# Patient Record
Sex: MALE | Race: WHITE | Employment: FULL TIME | URBAN - METROPOLITAN AREA
[De-identification: names, ages, dates, MRNs, and addresses within clinical notes are randomized per-mention and may not be internally consistent; named-entity substitution may affect disease eponyms.]

---

## 2017-02-09 DIAGNOSIS — F41.9 ANXIETY: Primary | ICD-10-CM

## 2017-02-09 NOTE — TELEPHONE ENCOUNTER
Bupropion er 150mg tabs       Last Written Prescription Date: 01/21/2016  Last Fill Quantity: 180; # refills: 1  Last Office Visit with [facility], [facility] or [facility] prescribing provider:  01/21/2016        Last PHQ-9 score on record=   PHQ-9 SCORE 3/2/2012   Total Score 1       AST       31   1/21/2016  ALT       46   1/21/2016

## 2017-02-12 RX ORDER — BUPROPION HYDROCHLORIDE 150 MG/1
150 TABLET, EXTENDED RELEASE ORAL 2 TIMES DAILY
Qty: 180 TABLET | Refills: 0 | Status: SHIPPED | OUTPATIENT
Start: 2017-02-12 | End: 2017-05-16

## 2017-03-02 ENCOUNTER — OFFICE VISIT (OUTPATIENT)
Dept: UROLOGY | Facility: CLINIC | Age: 67
End: 2017-03-02
Payer: COMMERCIAL

## 2017-03-02 VITALS
HEIGHT: 74 IN | HEART RATE: 68 BPM | BODY MASS INDEX: 35.94 KG/M2 | WEIGHT: 280 LBS | DIASTOLIC BLOOD PRESSURE: 66 MMHG | SYSTOLIC BLOOD PRESSURE: 132 MMHG

## 2017-03-02 DIAGNOSIS — Z85.46 PERSONAL HISTORY OF MALIGNANT NEOPLASM OF PROSTATE: Primary | ICD-10-CM

## 2017-03-02 DIAGNOSIS — Z85.46 PERSONAL HISTORY OF MALIGNANT NEOPLASM OF PROSTATE: ICD-10-CM

## 2017-03-02 LAB — PSA SERPL-MCNC: 0.82 NG/ML (ref 0–4)

## 2017-03-02 PROCEDURE — 99213 OFFICE O/P EST LOW 20 MIN: CPT | Performed by: UROLOGY

## 2017-03-02 PROCEDURE — 36415 COLL VENOUS BLD VENIPUNCTURE: CPT | Performed by: UROLOGY

## 2017-03-02 PROCEDURE — G0103 PSA SCREENING: HCPCS | Performed by: UROLOGY

## 2017-03-02 ASSESSMENT — PAIN SCALES - GENERAL: PAINLEVEL: NO PAIN (0)

## 2017-03-02 NOTE — NURSING NOTE
Chief Complaint   Patient presents with     Clinic Care Coordination - Follow-up     Yearly exam and PSA     Mahogany Greene LPN
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
## 2017-03-02 NOTE — MR AVS SNAPSHOT
"              After Visit Summary   3/2/2017    Keenan Sprague    MRN: [id number]           Patient Information     Date Of Birth          1950        Visit Information        Provider Department      3/2/2017 2:20 PM Adam Perez MD [facility] [place]        Today's Diagnoses     Personal history of malignant neoplasm of prostate           Follow-ups after your visit        Follow-up notes from your care team     Return in about 1 year (around 3/2/2018) for PSA, Physical Exam, cystoscopy.      Who to contact     If you have questions or need follow up information about today's clinic visit or your schedule please contact [facility] [place] directly at [phone].  Normal or non-critical lab and imaging results will be communicated to you by Zephyrhart, letter or phone within 4 business days after the clinic has received the results. If you do not hear from us within 7 days, please contact the clinic through Zephyrhart or phone. If you have a critical or abnormal lab result, we will notify you by phone as soon as possible.  Submit refill requests through You.i or call your pharmacy and they will forward the refill request to us. Please allow 3 business days for your refill to be completed.          Additional Information About Your Visit        MyChart Information     You.i gives you secure access to your electronic health record. If you see a primary care provider, you can also send messages to your care team and make appointments. If you have questions, please call your primary care clinic.  If you do not have a primary care provider, please call [phone] and they will assist you.        Care EveryWhere ID     This is your Care EveryWhere ID. This could be used by other organizations to access your [place] medical records  [id number]        Your Vitals Were     Pulse Height BMI (Body Mass Index)             68 1.88 m (6' 2\") " 35.95 kg/m2          Blood Pressure from Last 3 Encounters:   03/02/17 132/66   06/21/16 122/80   01/21/16 116/64    Weight from Last 3 Encounters:   03/02/17 127 kg (280 lb)   10/01/16 127.4 kg (280 lb 14.4 oz)   06/21/16 124.7 kg (275 lb)              We Performed the Following     PSA Screen Urologic Phys [[id number]]        Primary Care Provider Office Phone # Fax #    Floyd Zaman [phone]       [facility] [address] [facility] [address]  [facility] [address]        Thank you!     Thank you for choosing [facility] [place]  for your care. Our goal is always to provide you with excellent care. Hearing back from our patients is one way we can continue to improve our services. Please take a few minutes to complete the written survey that you may receive in the mail after your visit with us. Thank you!             Your Updated Medication List - Protect others around you: Learn how to safely use, store and throw away your medicines at www.disposemymeds.org.          This list is accurate as of: 3/2/17 11:59 PM.  Always use your most recent med list.                   Brand Name Dispense Instructions for use    aspirin  MG EC tablet     30 tablet    Take 1 tablet by mouth every 6 hours as needed for pain.       buPROPion 150 MG 12 hr tablet    WELLBUTRIN SR    180 tablet    Take 1 tablet (150 mg) by mouth 2 times daily       CENTRUM SILVER PO      Take 1 tablet by mouth daily.       fa-pyridoxine-cyancobalamin 2.5-25-2 MG Tabs per tablet    FOLBIC    180 each    Take 2 tablets by mouth daily       HYDROcodone-acetaminophen 5-325 MG per tablet    NORCO    15 tablet    Take 1 tablet by mouth every 6 hours as needed       * order for DME     1 Device    CPAP mask and supplies       * order for DME     1 Units    New CPAP machine, mask/interface.       * order for DME     1 Device    Auto-CPAP: Max 10 cm H2O Min 8 cm H2O  Continuous  Lifetime need and heated  humidity.       vitamin D 2000 UNITS tablet     100 tablet    Take 2,000 Units by mouth daily       * Notice:  This list has 3 medication(s) that are the same as other medications prescribed for you. Read the directions carefully, and ask your doctor or other care provider to review them with you.
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
## 2017-05-16 ENCOUNTER — TELEPHONE (OUTPATIENT)
Dept: INTERNAL MEDICINE | Facility: CLINIC | Age: 67
End: 2017-05-16

## 2017-05-16 DIAGNOSIS — F41.9 ANXIETY: ICD-10-CM

## 2017-05-16 RX ORDER — BUPROPION HYDROCHLORIDE 150 MG/1
150 TABLET, EXTENDED RELEASE ORAL 2 TIMES DAILY
Qty: 180 TABLET | Refills: 0 | Status: SHIPPED | OUTPATIENT
Start: 2017-05-16 | End: 2017-05-24

## 2017-05-16 NOTE — TELEPHONE ENCOUNTER
Reason for Call:  Medication or medication refill:    Do you use a [facility]?  Name of the pharmacy and phone number for the current request:  [facility]    Name of the medication requested: buPROPion (WELLBUTRIN SR) 150 MG 12 hr tablet    Other request: pt would like a refill for 3 months on the medication listed above    Can we leave a detailed message on this number? YES    Phone number patient can be reached at: Cell number on file:    Telephone Information:   Mobile [phone]       Best Time: anytime    Call taken on 5/16/2017 at 1:31 PM by Tashia Graves

## 2017-05-16 NOTE — TELEPHONE ENCOUNTER
Wellbutrin       Last Written Prescription Date: 2-12-17  Last Fill Quantity: 180; # refills: 0  Last Office Visit with [facility] or [facility] prescribing provider:  1-21-16        Last PHQ-9 score on record=   PHQ-9 SCORE 3/2/2012   Total Score 1       Lab Results   Component Value Date    AST 31 01/21/2016     Lab Results   Component Value Date    ALT 46 01/21/2016       Labs showing if normal/abnormal  Lab Results   Component Value Date    AST 31 01/21/2016    ALT 46 01/21/2016       Pt informed over-due for OV and transferred to schedule appt.    Med refilled x 1.

## 2017-05-24 ENCOUNTER — OFFICE VISIT (OUTPATIENT)
Dept: INTERNAL MEDICINE | Facility: CLINIC | Age: 67
End: 2017-05-24
Payer: COMMERCIAL

## 2017-05-24 VITALS
WEIGHT: 284.4 LBS | HEIGHT: 74 IN | OXYGEN SATURATION: 96 % | RESPIRATION RATE: 16 BRPM | SYSTOLIC BLOOD PRESSURE: 126 MMHG | HEART RATE: 72 BPM | DIASTOLIC BLOOD PRESSURE: 78 MMHG | BODY MASS INDEX: 36.5 KG/M2 | TEMPERATURE: 98.4 F

## 2017-05-24 DIAGNOSIS — F41.9 ANXIETY: ICD-10-CM

## 2017-05-24 PROCEDURE — 99212 OFFICE O/P EST SF 10 MIN: CPT | Performed by: NURSE PRACTITIONER

## 2017-05-24 RX ORDER — BUPROPION HYDROCHLORIDE 150 MG/1
150 TABLET, EXTENDED RELEASE ORAL 2 TIMES DAILY
Qty: 180 TABLET | Refills: 3 | Status: SHIPPED | OUTPATIENT
Start: 2017-05-24 | End: 2018-07-10

## 2017-05-24 ASSESSMENT — ANXIETY QUESTIONNAIRES
1. FEELING NERVOUS, ANXIOUS, OR ON EDGE: NOT AT ALL
IF YOU CHECKED OFF ANY PROBLEMS ON THIS QUESTIONNAIRE, HOW DIFFICULT HAVE THESE PROBLEMS MADE IT FOR YOU TO DO YOUR WORK, TAKE CARE OF THINGS AT HOME, OR GET ALONG WITH OTHER PEOPLE: NOT DIFFICULT AT ALL
3. WORRYING TOO MUCH ABOUT DIFFERENT THINGS: NOT AT ALL
6. BECOMING EASILY ANNOYED OR IRRITABLE: NOT AT ALL
2. NOT BEING ABLE TO STOP OR CONTROL WORRYING: NOT AT ALL
7. FEELING AFRAID AS IF SOMETHING AWFUL MIGHT HAPPEN: NOT AT ALL
GAD7 TOTAL SCORE: 0
5. BEING SO RESTLESS THAT IT IS HARD TO SIT STILL: NOT AT ALL

## 2017-05-24 ASSESSMENT — PATIENT HEALTH QUESTIONNAIRE - PHQ9: 5. POOR APPETITE OR OVEREATING: NOT AT ALL

## 2017-05-24 NOTE — MR AVS SNAPSHOT
"              After Visit Summary   5/24/2017    Keenan Sprague    MRN: [id number]           Patient Information     Date Of Birth          1950        Visit Information        Provider Department      5/24/2017 6:40 AM Esperanza Yang NP [facility]        Today's Diagnoses     Anxiety           Follow-ups after your visit        Who to contact     If you have questions or need follow up information about today's clinic visit or your schedule please contact [facility] directly at [phone].  Normal or non-critical lab and imaging results will be communicated to you by MyChart, letter or phone within 4 business days after the clinic has received the results. If you do not hear from us within 7 days, please contact the clinic through Virtwayhart or phone. If you have a critical or abnormal lab result, we will notify you by phone as soon as possible.  Submit refill requests through ePark Systems or call your pharmacy and they will forward the refill request to us. Please allow 3 business days for your refill to be completed.          Additional Information About Your Visit        MyChart Information     ePark Systems gives you secure access to your electronic health record. If you see a primary care provider, you can also send messages to your care team and make appointments. If you have questions, please call your primary care clinic.  If you do not have a primary care provider, please call [phone] and they will assist you.        Care EveryWhere ID     This is your Care EveryWhere ID. This could be used by other organizations to access your [place] medical records  [id number]        Your Vitals Were     Pulse Temperature Respirations Height Pulse Oximetry BMI (Body Mass Index)    72 98.4  F (36.9  C) (Oral) 16 6' 2\" (1.88 m) 96% 36.51 kg/m2       Blood Pressure from Last 3 Encounters:   05/24/17 126/78   03/02/17 132/66   06/21/16 122/80    Weight from Last 3 Encounters: "   05/24/17 284 lb 6.4 oz (129 kg)   03/02/17 280 lb (127 kg)   10/01/16 280 lb 14.4 oz (127.4 kg)              Today, you had the following     No orders found for display         Where to get your medicines      These medications were sent to [facility] [place] - [address] [address] [address]  [address] [facility] [address]     Phone:  [phone]     buPROPion 150 MG 12 hr tablet          Primary Care Provider Office Phone # Fax #    Floyd Zaman [phone]       [facility] [address] [facility] [address]  [facility] [address]        Thank you!     Thank you for choosing [facility]  for your care. Our goal is always to provide you with excellent care. Hearing back from our patients is one way we can continue to improve our services. Please take a few minutes to complete the written survey that you may receive in the mail after your visit with us. Thank you!             Your Updated Medication List - Protect others around you: Learn how to safely use, store and throw away your medicines at www.disposemymeds.org.          This list is accurate as of: 5/24/17  7:21 AM.  Always use your most recent med list.                   Brand Name Dispense Instructions for use    aspirin  MG EC tablet     30 tablet    Take 1 tablet by mouth every 6 hours as needed for pain.       buPROPion 150 MG 12 hr tablet    WELLBUTRIN SR    180 tablet    Take 1 tablet (150 mg) by mouth 2 times daily       CENTRUM SILVER PO      Take 1 tablet by mouth daily.       fa-pyridoxine-cyancobalamin 2.5-25-2 MG Tabs per tablet    FOLBIC    180 each    Take 2 tablets by mouth daily       * order for DME     1 Device    CPAP mask and supplies       * order for DME     1 Units    New CPAP machine, mask/interface.       * order for DME     1 Device    Auto-CPAP: Max 10 cm H2O Min 8 cm H2O  Continuous  Lifetime need and heated humidity.       vitamin D 2000 UNITS tablet      100 tablet    Take 2,000 Units by mouth daily       * Notice:  This list has 3 medication(s) that are the same as other medications prescribed for you. Read the directions carefully, and ask your doctor or other care provider to review them with you.

## 2017-05-24 NOTE — NURSING NOTE
"Chief Complaint   Patient presents with     Recheck Medication     wellbutrin       Initial /78 (BP Location: Right arm, Patient Position: Chair, Cuff Size: Adult Large)  Pulse 72  Temp 98.4  F (36.9  C) (Oral)  Resp 16  Ht 6' 2\" (1.88 m)  Wt 284 lb 6.4 oz (129 kg)  SpO2 96%  BMI 36.51 kg/m2 Estimated body mass index is 36.51 kg/(m^2) as calculated from the following:    Height as of this encounter: 6' 2\" (1.88 m).    Weight as of this encounter: 284 lb 6.4 oz (129 kg).  Medication Reconciliation: complete    "
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
## 2017-05-25 ASSESSMENT — ANXIETY QUESTIONNAIRES: GAD7 TOTAL SCORE: 0

## 2017-05-25 ASSESSMENT — PATIENT HEALTH QUESTIONNAIRE - PHQ9: SUM OF ALL RESPONSES TO PHQ QUESTIONS 1-9: 0

## 2017-06-29 ENCOUNTER — APPOINTMENT (OUTPATIENT)
Dept: GENERAL RADIOLOGY | Facility: CLINIC | Age: 67
End: 2017-06-29
Attending: EMERGENCY MEDICINE
Payer: COMMERCIAL

## 2017-06-29 ENCOUNTER — HOSPITAL ENCOUNTER (EMERGENCY)
Facility: CLINIC | Age: 67
Discharge: HOME | End: 2017-06-29
Attending: EMERGENCY MEDICINE | Admitting: EMERGENCY MEDICINE
Payer: COMMERCIAL

## 2017-06-29 VITALS
TEMPERATURE: 98.6 F | OXYGEN SATURATION: 97 % | HEART RATE: 89 BPM | SYSTOLIC BLOOD PRESSURE: 119 MMHG | RESPIRATION RATE: 12 BRPM | DIASTOLIC BLOOD PRESSURE: 82 MMHG

## 2017-06-29 DIAGNOSIS — M25.511 ACUTE PAIN OF RIGHT SHOULDER: ICD-10-CM

## 2017-06-29 PROCEDURE — 73030 X-RAY EXAM OF SHOULDER: CPT | Mod: RT

## 2017-06-29 PROCEDURE — 99283 EMERGENCY DEPT VISIT LOW MDM: CPT

## 2017-06-29 RX ORDER — METHOCARBAMOL 500 MG/1
1000 TABLET, FILM COATED ORAL 4 TIMES DAILY PRN
Qty: 40 TABLET | Refills: 0 | Status: SHIPPED | OUTPATIENT
Start: 2017-06-29 | End: 2017-07-04

## 2017-06-29 ASSESSMENT — ENCOUNTER SYMPTOMS
HEADACHES: 0
SORE THROAT: 0
HEMATURIA: 0
CONSTIPATION: 0
FEVER: 0
PALPITATIONS: 0
NAUSEA: 0
VOMITING: 0
RHINORRHEA: 0
SHORTNESS OF BREATH: 0
BLOOD IN STOOL: 0
SINUS PRESSURE: 0
DIZZINESS: 0
CHILLS: 0
NECK STIFFNESS: 0
DYSURIA: 0
NUMBNESS: 0
DIARRHEA: 0
ARTHRALGIAS: 1
LIGHT-HEADEDNESS: 0
BACK PAIN: 0
NECK PAIN: 0
COUGH: 0
ABDOMINAL PAIN: 0

## 2017-06-29 NOTE — ED NOTES
Pt c/o pain in right shoulder that began this afternoon at about 2 or 3. Pt then noticed he had an area of swelling on the top of his shoulder. Denies any injury that he knows of, but states he does a lot of overhead lifting. Pt alert and oriented, ABCs intact.

## 2017-06-29 NOTE — ED AVS SNAPSHOT
[facility] Emergency Department    [address]    [facility] [address]    Phone:  [phone]    Fax:  [phone]                                       Keenan Sprague   MRN: [id number]    Department:  [facility] Emergency Department   Date of Visit:  6/29/2017           Patient Information     Date Of Birth          1950        Your diagnoses for this visit were:     Acute pain of right shoulder        You were seen by Adam Galdamez MD and Melany Benavides PA-C.      Follow-up Information     Follow up with Floyd Zaman MD In 3 days.    Specialty:  Internal Medicine    Why:  As needed    Contact information:    [facility]  [address]  [facility] [address]  [phone]          Follow up with [facility] Emergency Department.    Specialty:  EMERGENCY MEDICINE    Why:  If symptoms worsen    Contact information:    [address]  [facility] [address] [phone]        Discharge Instructions       Ice to the area, massage and gentle range of motion exercises will be helpful!        Exercises for Shoulder Flexibility: Internal Rotation    This stretch can help restore shoulder flexibility and relieve pain over time. When stretching, be sure to breathe deeply. Follow any special instructions from your healthcare provider or physical therapist.  1. While seated, move the arm on the side you want to stretch toward the middle of your back. The palm of your hand should face out.  2. Cup your other hand under the hand that s behind your back. Gently push your cupped hand upward until you feel the stretch in the shoulder. Try to hold the stretch for 5 seconds.  3. Work up to doing 3 sets of this stretch, 3 times a day. Work up to holding the stretch for 30 to 60 seconds.  Note: Keep your back straight. It s OK if your hand can t reach the middle of your back. Instead, start the stretch with your hand  as close as you can get it to the middle of your back.     Frozen shoulder  Frozen shoulder is another name for adhesive capsulitis. This causes restricted movement in the shoulder. If you have frozen shoulder, this stretch may cause discomfort, especially when you first get started. A few months may pass before you achieve the results you want. But once your shoulder heals, it rarely becomes frozen again. So stick to your stretching program. If you have any questions, be sure to ask your healthcare provider.   Date Last Reviewed: 10/14/2015    9627-7019 The [employer] website. [address] [place] [address]. All rights reserved. This information is not intended as a substitute for professional medical care. Always follow your healthcare professional's instructions.          Exercises for Shoulder Flexibility: External Rotation    This stretch can help restore shoulder flexibility and relieve pain over time. When stretching, be sure to breathe deeply. Follow any special instructions from your doctor or physical therapist:  4.  a doorway. Grasp the doorjamb with the hand on the frozen side. Your arm should be bent.  5. With the other hand, hold the elbow on the frozen side firmly against your body.  6. Standing in the same spot, rotate your body away from the doorjamb. Stop when you feel the stretch in the shoulder. At first, try to hold the stretch for 5 seconds.  7. Work up to doing 3 sets of this stretch, 3 times a day. Work up to holding the stretch for 30 to 60 seconds.  Note: Keep your arms as still as you can. Over time, rotate your body a little more to enhance the stretch. But be careful not to twist your back.  Frozen shoulder  Frozen shoulder is another name for adhesive capsulitis, which causes restricted movement in the shoulder. If you have frozen shoulder, this stretch may cause discomfort, especially when you first get started. A few months may pass before you achieve the results  you want. But once your shoulder heals, it rarely becomes frozen again. So stick to your stretching program. If you have any questions, be sure to ask your doctor.   Date Last Reviewed: 8/16/2015 2000-2017 [employer]. [address] [place] [address]. All rights reserved. This information is not intended as a substitute for professional medical care. Always follow your healthcare professional's instructions.          Exercises for Shoulder Flexibility: Adduction (Reaching Across)    This stretch can help restore shoulder flexibility and relieve pain over time. When stretching, be sure to breathe deeply. And follow any special instructions from your doctor or physical therapist:  8. Put the hand from the side you want to stretch on your opposite shoulder. Your elbow should point away from your body. Try to raise your elbow as close to shoulder height as you can.  9. With your other hand, push the raised elbow toward the opposite shoulder. Avoid turning your head. Stop when you feel the stretch. Try to hold the stretch for 5 seconds.  10. Work up to doing 3 sets of this stretch, 3 times a day. Work up to holding the stretch for 30 to 60 seconds.  Note: Be sure to push your elbow across your chest, not up toward your chin. Over time, try to push your elbow farther across your chest to enhance the stretch.  Frozen shoulder  Frozen shoulder is another name for adhesive capsulitis, which causes restricted movement in the shoulder. If you have frozen shoulder, this stretch may cause discomfort, especially when you first get started. A few months may pass before you achieve the results you want. Once your shoulder heals, it rarely becomes frozen again. So stick to your stretching program. If you have any questions, be sure to ask your doctor.   Date Last Reviewed: 8/16/2015 2000-2017 [employer]. [address] [place] [address]. All rights reserved. This information is  not intended as a substitute for professional medical care. Always follow your healthcare professional's instructions.          Exercises for Shoulder Flexibility: Back Scratch    Improving your flexibility can reduce pain. Stretching exercises also can help increase your range of pain-free motion. Breathe normally when you exercise. Try to use smooth, fluid movements. Never force a stretch.  Note: Follow any special instructions you are given. If you feel pain, stop the exercise. If the pain continues after stopping, call your healthcare provider.    Stand straight, placing the back of your hand on the side you want to stretch flat against your lower back.    Throw one end of a towel over your shoulder. Grab it behind your back with your other hand.    Pull down gently on the towel with your front arm. Let your back arm slide up as high as is comfortable. You ll feel a stretch in your shoulder. Hold the stretch for a few seconds.    Repeat 3 to 5 times. Build up to holding each stretch for 30 to 60 seconds.  For your safety, check with your healthcare provider before starting an exercise program.   Date Last Reviewed: 8/26/2015 2000-2017 [employer]. [address] [place] [address]. All rights reserved. This information is not intended as a substitute for professional medical care. Always follow your healthcare professional's instructions.          24 Hour Appointment Hotline       To make an appointment at any [facility] clinic, call [phone] ([phone]). If you don't have a family doctor or clinic, we will help you find one. [facility] clinics are conveniently located to serve the needs of you and your family.             Review of your medicines      START taking        Dose / Directions Last dose taken    methocarbamol 500 MG tablet   Commonly known as:  ROBAXIN   Dose:  1000 mg   Quantity:  40 tablet        Take 2 tablets (1,000 mg) by mouth 4 times daily as needed for muscle  spasms   Refills:  0          Our records show that you are taking the medicines listed below. If these are incorrect, please call your family doctor or clinic.        Dose / Directions Last dose taken    aspirin  MG EC tablet   Dose:  325 mg   Quantity:  30 tablet        Take 1 tablet by mouth every 6 hours as needed for pain.   Refills:  0        buPROPion 150 MG 12 hr tablet   Commonly known as:  WELLBUTRIN SR   Dose:  150 mg   Quantity:  180 tablet        Take 1 tablet (150 mg) by mouth 2 times daily   Refills:  3        CENTRUM SILVER PO   Dose:  1 tablet        Take 1 tablet by mouth daily.   Refills:  0        fa-pyridoxine-cyancobalamin 2.5-25-2 MG Tabs per tablet   Commonly known as:  FOLBIC   Dose:  2 tablet   Quantity:  180 each        Take 2 tablets by mouth daily   Refills:  4        * order for DME   Quantity:  1 Device        CPAP mask and supplies   Refills:  0        * order for DME   Quantity:  1 Units        New CPAP machine, mask/interface.   Refills:  1        * order for DME   Quantity:  1 Device        Auto-CPAP: Max 10 cm H2O Min 8 cm H2O  Continuous  Lifetime need and heated humidity.   Refills:  0        vitamin D 2000 UNITS tablet   Dose:  2000 Units   Quantity:  100 tablet        Take 2,000 Units by mouth daily   Refills:  3        * Notice:  This list has 3 medication(s) that are the same as other medications prescribed for you. Read the directions carefully, and ask your doctor or other care provider to review them with you.            Prescriptions were sent or printed at these locations (1 Prescription)                   Other Prescriptions                Printed at Department/Unit printer (1 of 1)         methocarbamol (ROBAXIN) 500 MG tablet                Procedures and tests performed during your visit     Shoulder XR, G/E 3 views, right      Orders Needing Specimen Collection     None      Pending Results     Date and Time Order Name Status Description    6/29/2017 1752  Shoulder XR, G/E 3 views, right Preliminary             Pending Culture Results     No orders found from 6/27/2017 to 6/30/2017.            Pending Results Instructions     If you had any lab results that were not finalized at the time of your Discharge, you can call the ED Lab Result RN at [phone]. You will be contacted by this team for any positive Lab results or changes in treatment. The nurses are available 7 days a week from 10A to 6:30P.  You can leave a message 24 hours per day and they will return your call.        Test Results From Your Hospital Stay        6/29/2017  6:54 PM      Narrative     RIGHT SHOULDER THREE OR MORE VIEWS   6/29/2017 6:31 PM     INDICATION: Pain to top of shoulder.    COMPARISON: None.        Impression     IMPRESSION: No acute fractures or dislocation. Mild degenerative  changes right shoulder and AC joint.                 Clinical Quality Measure: Blood Pressure Screening     Your blood pressure was checked while you were in the emergency department today. The last reading we obtained was  BP: 146/83 . Please read the guidelines below about what these numbers mean and what you should do about them.  If your systolic blood pressure (the top number) is less than 120 and your diastolic blood pressure (the bottom number) is less than 80, then your blood pressure is normal. There is nothing more that you need to do about it.  If your systolic blood pressure (the top number) is 120-139 or your diastolic blood pressure (the bottom number) is 80-89, your blood pressure may be higher than it should be. You should have your blood pressure rechecked within a year by a primary care provider.  If your systolic blood pressure (the top number) is 140 or greater or your diastolic blood pressure (the bottom number) is 90 or greater, you may have high blood pressure. High blood pressure is treatable, but if left untreated over time it can put you at risk for heart attack, stroke, or kidney  failure. You should have your blood pressure rechecked by a primary care provider within the next 4 weeks.  If your provider in the emergency department today gave you specific instructions to follow-up with your doctor or provider even sooner than that, you should follow that instruction and not wait for up to 4 weeks for your follow-up visit.        Thank you for choosing [facility]       Thank you for choosing [facility] for your care. Our goal is always to provide you with excellent care. Hearing back from our patients is one way we can continue to improve our services. Please take a few minutes to complete the written survey that you may receive in the mail after you visit with us. Thank you!        [employer] Information     [facility] [employer] gives you secure access to your electronic health record. If you see a primary care provider, you can also send messages to your care team and make appointments. If you have questions, please call your primary care clinic.  If you do not have a primary care provider, please call [phone] and they will assist you.        Care EveryWhere ID     This is your Care EveryWhere ID. This could be used by other organizations to access your [facility] medical records  [id number]        Equal Access to Services     AMERICA LORA : Hadteresa Lock, xavier marquez, vivien michael. So [facility] [phone].    ATENCIÓN: Si habla español, tiene a garcia disposición servicios gratuitos de asistencia lingüística. Salomón al [phone].    We comply with applicable federal civil rights laws and [place] laws. We do not discriminate on the basis of race, color, national origin, age, disability sex, sexual orientation or gender identity.            After Visit Summary       This is your record. Keep this with you and show to your community pharmacist(s) and doctor(s) at your next visit.

## 2017-06-29 NOTE — ED PROVIDER NOTES
History   Chief Complaint:  Right Shoulder Pain    HPI   Keenan Sprague is a 66 year old male with a history of wrist and shoulder reductions who presents with pain in the right shoulder that began this afternoon at 1400 while at work. He states he touched the shoulder at 1600 today and noticed a bump/swelling that was painful. He states he lifts objects all day at work and needs to be able to go to work tomorrow. He states moving the arm laterally exacerbates the pain. He denies injury or trauma. He denies back pain, collar bone pain, left arm pain, leg pain or neck pain.      Allergies:  Ibuprofen     Medications:    buPROPion (WELLBUTRIN SR) 150 MG 12 hr tablet  fa-pyridoxine-cyancobalamin (FOLBIC) 2.5-25-2 MG TABS  Cholecalciferol (VITAMIN D) 2000 UNITS tablet  aspirin  MG tablet  Multiple Vitamins-Minerals (CENTRUM SILVER PO)    Past Medical History:    Embolism and thrombosis   lipomatosis  Prostate cancer  Anxiety  JACKI  Neoplasm of prostates    Past Surgical History:    Lipoma  Tonsillectomy  Open reduction internal fixation clavicle  Open reduction internal fixation wrist    Family History:    Cancer  Colon polyps    Social History:  Marital Status:   [2]  Was injured at work and needs a work note    Review of Systems   Constitutional: Negative for chills and fever.   HENT: Negative for nosebleeds, rhinorrhea, sinus pressure, sneezing and sore throat.    Respiratory: Negative for cough and shortness of breath.    Cardiovascular: Negative for chest pain, palpitations and leg swelling.   Gastrointestinal: Negative for abdominal pain, blood in stool, constipation, diarrhea, nausea and vomiting.   Genitourinary: Negative for dysuria, enuresis and hematuria.   Musculoskeletal: Positive for arthralgias (right shoulder). Negative for back pain, neck pain and neck stiffness.   Skin: Negative for rash.   Neurological: Negative for dizziness, syncope, light-headedness, numbness and headaches.   All  other systems reviewed and are negative.    Physical Exam   Patient Vitals for the past 24 hrs:   BP Temp Temp src Pulse Heart Rate Resp SpO2   06/29/17 1744 146/83 98.6  F (37  C) Oral 71 71 18 100 %   Physical Exam  Constitutional:  Alert, attentive  Cardiovascular:  2+ radial and ulnar pulses to the bilateral upper extremities   Neurological:  5/5 strength to the radian, ulnar and median motor distributions;      sensation intact to light touch to the radian, ulnar and median distributions  MSK:   There is point tenderness to the right superior anterior shoulder with normal ROM. However, abduction past 90 degrees causes pain in the area. No    tenderness to palpation of the AC joint, clavicle, scapula, humerus, elbow, forearm, wrist or hand.   Skin:    Skin is warm and dry. No rashes.     Emergency Department Course   Imaging:  Radiographic findings were communicated with the patient who voiced understanding of the findings.  Shoulder XR, G/E 3 views, right:  No acute fractures or dislocation. Mild degenerative  changes right shoulder and AC joint.   As read by Radiology.    Emergency Department Course:  The patient was sent for a right shoulder xray while in the emergency department, findings above.  I rechecked the patient.  Findings and plan explained to the Patient. Patient discharged home with instructions regarding supportive care, medications, and reasons to return. The importance of close follow-up was reviewed.     Impression & Plan    Medical Decision Making:  Keenan Sprague is a 66 year old male who presents for evaluation of shoulder pain after lifting heavy objects at work.  This is not consistent by clinical and radiological exam with a shoulder dislocation. X-ray shows no fracture or dislocation and patient is able to range the shoulder without difficulty, however he does have pain with abduction past 90 degrees. I do not suspect AC joint separation given normal appearing x-ray. Offered sling but  patient declined. Offered pain medications for nighttime but patient declined. The patient was discharged in stable condition and given robaxin to help with possible muscle spasms. He requested a note for work stating he can return without restrictions which was provided. He will follow up with PCP as needed and return here with concerns.     Diagnosis:    ICD-10-CM   1. Acute pain of right shoulder M25.511     Disposition:  Discharged to home    Discharge Medications:  New Prescriptions    METHOCARBAMOL (ROBAXIN) 500 MG TABLET    Take 2 tablets (1,000 mg) by mouth 4 times daily as needed for muscle spasms       Sade Thompson  6/29/2017   [facility] EMERGENCY DEPARTMENT    I, Sade Thompson, am serving as a scribe at 6:02 PM on 6/29/2017 to document services personally performed by Melany Benavides PA-C based on my observations and the provider's statements to me.        Melany Benavides PA-C  06/29/17 1942

## 2017-06-29 NOTE — ED AVS SNAPSHOT
[facility] Emergency Department    [address]    [facility] [address]    Phone:  [phone]    Fax:  [phone]                                       Keenan Sprague   MRN: [id number]    Department:  [facility] Emergency Department   Date of Visit:  6/29/2017           After Visit Summary Signature Page     I have received my discharge instructions, and my questions have been answered. I have discussed any challenges I see with this plan with the nurse or doctor.    ..........................................................................................................................................  Patient/Patient Representative Signature      ..........................................................................................................................................  Patient Representative Print Name and Relationship to Patient    ..................................................               ................................................  Date                                            Time    ..........................................................................................................................................  Reviewed by Signature/Title    ...................................................              ..............................................  Date                                                            Time

## 2017-06-29 NOTE — LETTER
[facility] EMERGENCY DEPARTMENT  [address]  [facility] [address]  [phone]    Keenan Sprague  [address] [facility] [address]  [phone] (home) [phone] (work)    : 1950      To Whom it may concern:    Keenan Sprague was seen in our Emergency Department today, 2017. He may return to work immediately without restrictions.     Sincerely,          Marlin Benavides PA-C

## 2017-06-30 NOTE — DISCHARGE INSTRUCTIONS
Ice to the area, massage and gentle range of motion exercises will be helpful!        Exercises for Shoulder Flexibility: Internal Rotation    This stretch can help restore shoulder flexibility and relieve pain over time. When stretching, be sure to breathe deeply. Follow any special instructions from your healthcare provider or physical therapist.  1. While seated, move the arm on the side you want to stretch toward the middle of your back. The palm of your hand should face out.  2. Cup your other hand under the hand that s behind your back. Gently push your cupped hand upward until you feel the stretch in the shoulder. Try to hold the stretch for 5 seconds.  3. Work up to doing 3 sets of this stretch, 3 times a day. Work up to holding the stretch for 30 to 60 seconds.  Note: Keep your back straight. It s OK if your hand can t reach the middle of your back. Instead, start the stretch with your hand as close as you can get it to the middle of your back.     Frozen shoulder  Frozen shoulder is another name for adhesive capsulitis. This causes restricted movement in the shoulder. If you have frozen shoulder, this stretch may cause discomfort, especially when you first get started. A few months may pass before you achieve the results you want. But once your shoulder heals, it rarely becomes frozen again. So stick to your stretching program. If you have any questions, be sure to ask your healthcare provider.   Date Last Reviewed: 10/14/2015    2735-7111 [employer]. [address] [place] [address]. All rights reserved. This information is not intended as a substitute for professional medical care. Always follow your healthcare professional's instructions.          Exercises for Shoulder Flexibility: External Rotation    This stretch can help restore shoulder flexibility and relieve pain over time. When stretching, be sure to breathe deeply. Follow any special instructions from your doctor or  physical therapist:  4.  a doorway. Grasp the doorjamb with the hand on the frozen side. Your arm should be bent.  5. With the other hand, hold the elbow on the frozen side firmly against your body.  6. Standing in the same spot, rotate your body away from the doorjamb. Stop when you feel the stretch in the shoulder. At first, try to hold the stretch for 5 seconds.  7. Work up to doing 3 sets of this stretch, 3 times a day. Work up to holding the stretch for 30 to 60 seconds.  Note: Keep your arms as still as you can. Over time, rotate your body a little more to enhance the stretch. But be careful not to twist your back.  Frozen shoulder  Frozen shoulder is another name for adhesive capsulitis, which causes restricted movement in the shoulder. If you have frozen shoulder, this stretch may cause discomfort, especially when you first get started. A few months may pass before you achieve the results you want. But once your shoulder heals, it rarely becomes frozen again. So stick to your stretching program. If you have any questions, be sure to ask your doctor.   Date Last Reviewed: 8/16/2015 2000-2017 [employer]. [address] [place] [address]. All rights reserved. This information is not intended as a substitute for professional medical care. Always follow your healthcare professional's instructions.          Exercises for Shoulder Flexibility: Adduction (Reaching Across)    This stretch can help restore shoulder flexibility and relieve pain over time. When stretching, be sure to breathe deeply. And follow any special instructions from your doctor or physical therapist:  8. Put the hand from the side you want to stretch on your opposite shoulder. Your elbow should point away from your body. Try to raise your elbow as close to shoulder height as you can.  9. With your other hand, push the raised elbow toward the opposite shoulder. Avoid turning your head. Stop when you feel the  stretch. Try to hold the stretch for 5 seconds.  10. Work up to doing 3 sets of this stretch, 3 times a day. Work up to holding the stretch for 30 to 60 seconds.  Note: Be sure to push your elbow across your chest, not up toward your chin. Over time, try to push your elbow farther across your chest to enhance the stretch.  Frozen shoulder  Frozen shoulder is another name for adhesive capsulitis, which causes restricted movement in the shoulder. If you have frozen shoulder, this stretch may cause discomfort, especially when you first get started. A few months may pass before you achieve the results you want. Once your shoulder heals, it rarely becomes frozen again. So stick to your stretching program. If you have any questions, be sure to ask your doctor.   Date Last Reviewed: 8/16/2015 2000-2017 [employer]. [address] [place] [address]. All rights reserved. This information is not intended as a substitute for professional medical care. Always follow your healthcare professional's instructions.          Exercises for Shoulder Flexibility: Back Scratch    Improving your flexibility can reduce pain. Stretching exercises also can help increase your range of pain-free motion. Breathe normally when you exercise. Try to use smooth, fluid movements. Never force a stretch.  Note: Follow any special instructions you are given. If you feel pain, stop the exercise. If the pain continues after stopping, call your healthcare provider.    Stand straight, placing the back of your hand on the side you want to stretch flat against your lower back.    Throw one end of a towel over your shoulder. Grab it behind your back with your other hand.    Pull down gently on the towel with your front arm. Let your back arm slide up as high as is comfortable. You ll feel a stretch in your shoulder. Hold the stretch for a few seconds.    Repeat 3 to 5 times. Build up to holding each stretch for 30 to 60 seconds.  For  your safety, check with your healthcare provider before starting an exercise program.   Date Last Reviewed: 8/26/2015 2000-2017 [employer]. [address] [place] [address], [place] [address]. All rights reserved. This information is not intended as a substitute for professional medical care. Always follow your healthcare professional's instructions.

## 2017-09-12 ENCOUNTER — TRANSFERRED RECORDS (OUTPATIENT)
Dept: HEALTH INFORMATION MANAGEMENT | Facility: CLINIC | Age: 67
End: 2017-09-12

## 2018-01-09 ENCOUNTER — TRANSFERRED RECORDS (OUTPATIENT)
Dept: HEALTH INFORMATION MANAGEMENT | Facility: CLINIC | Age: 68
End: 2018-01-09

## 2018-01-18 ENCOUNTER — TRANSFERRED RECORDS (OUTPATIENT)
Dept: HEALTH INFORMATION MANAGEMENT | Facility: CLINIC | Age: 68
End: 2018-01-18

## 2018-02-01 DIAGNOSIS — G47.33 OSA (OBSTRUCTIVE SLEEP APNEA): Primary | ICD-10-CM

## 2018-02-01 NOTE — PROGRESS NOTES
Patient stopped by reporting feeling like he isn't getting enough pressure.  Reviewed download.  Machine set at 5 - 20 cmH2O  Average 9.7 cmH2O, 90% limit at 11.2 cmH2O.  Set to 10 - 20 cmH2O auto range and turned off ramp.  Pressure changed in clinic. Sending order to DME to update their records.  James Cárdenas MD, Sleep Physician  Feb 1, 2018

## 2018-05-19 ENCOUNTER — OFFICE VISIT (OUTPATIENT)
Dept: URGENT CARE | Facility: URGENT CARE | Age: 68
End: 2018-05-19
Payer: COMMERCIAL

## 2018-05-19 VITALS
SYSTOLIC BLOOD PRESSURE: 110 MMHG | HEART RATE: 81 BPM | DIASTOLIC BLOOD PRESSURE: 72 MMHG | OXYGEN SATURATION: 97 % | TEMPERATURE: 98 F

## 2018-05-19 DIAGNOSIS — M25.561 ACUTE PAIN OF RIGHT KNEE: Primary | ICD-10-CM

## 2018-05-19 PROCEDURE — 20610 DRAIN/INJ JOINT/BURSA W/O US: CPT | Performed by: FAMILY MEDICINE

## 2018-05-19 RX ORDER — TRIAMCINOLONE ACETONIDE 40 MG/ML
40 INJECTION, SUSPENSION INTRA-ARTICULAR; INTRAMUSCULAR ONCE
Qty: 1 ML | Refills: 0 | Status: ON HOLD | OUTPATIENT
Start: 2018-05-19 | End: 2019-02-23

## 2018-05-19 NOTE — PROGRESS NOTES
SUBJECTIVE:  Keenan Sprague is a 67 year old male who complains of right knee pain for several months.. Immediate symptoms: delayed pain, delayed swelling. Symptoms have been gradual since that time. Prior history of related problems:  prior problems with this area in the past.    OBJECTIVE:  Vital signs as noted above.  Appearance: in no apparent distress.  Knee exam: His exam shows swelling in the affected knee and some slight tenderness and bogginess to the knee itself.  He has legs show edema.  As well as neuropathy  X-ray: not indicated.    ASSESSMENT:  Knee Arthritis; we did taken to the procedure room there the area on his knee was cleaned prepped and it was draped we then using the medial aspect subpatellar we injected 2 cc of lidocaine and 1 of Kenalog.  He tolerated the procedure well without complaints of pain or swelling small bandage was placed over the wound he will return to clinic on a as needed basis    PLAN:  1.  Cortisone injection  2. NSAID, ice suggested  3. I think he needs to be seen by his primary care.  We did have suggestion for different kinds of    4. Compression stockings.  In addition might consider edema clinic    I did see in his chart that he has been seen by [facility] orthopedics in the past as early as 2015 probably should have another visit with them they might suggest some other types of injections in his knee as we did discuss today.    I did discuss with him that I would like him to follow-up with his primary care it appears that he has not seen Dr. Zaman in a couple of years.  Did discuss that Dr. Ganser is a  Very good internal medicine physician and he should find good relief on his problems and the plan with him.  [facility]

## 2018-05-19 NOTE — MR AVS SNAPSHOT
After Visit Summary   5/19/2018    Keenan Sprague    MRN: [id number]           Patient Information     Date Of Birth          1950        Visit Information        Provider Department      5/19/2018 3:30 PM Festus Denis MD [facility] URGENT CARE        Today's Diagnoses     Acute pain of right knee    -  1       Follow-ups after your visit        Who to contact     If you have questions or need follow up information about today's clinic visit or your schedule please contact [facility] directly at [phone].  Normal or non-critical lab and imaging results will be communicated to you by xPeerienthart, letter or phone within 4 business days after the clinic has received the results. If you do not hear from us within 7 days, please contact the clinic through wikifoliot or phone. If you have a critical or abnormal lab result, we will notify you by phone as soon as possible.  Submit refill requests through [employer] or call your pharmacy and they will forward the refill request to us. Please allow 3 business days for your refill to be completed.          Additional Information About Your Visit        MyChart Information     [employer] gives you secure access to your electronic health record. If you see a primary care provider, you can also send messages to your care team and make appointments. If you have questions, please call your primary care clinic.  If you do not have a primary care provider, please call [phone] and they will assist you.        Care EveryWhere ID     This is your Care EveryWhere ID. This could be used by other organizations to access your [facility] medical records  [id number]        Your Vitals Were     Pulse Temperature Pulse Oximetry             81 98  F (36.7  C) (Oral) 97%          Blood Pressure from Last 3 Encounters:   05/19/18 110/72   06/29/17 119/82   05/24/17 126/78    Weight from Last 3 Encounters:   05/24/17 284 lb 6.4 oz (129 kg)   03/02/17  280 lb (127 kg)   10/01/16 280 lb 14.4 oz (127.4 kg)              Today, you had the following     No orders found for display         Today's Medication Changes          These changes are accurate as of 5/19/18  6:39 PM.  If you have any questions, ask your nurse or doctor.               Start taking these medicines.        Dose/Directions    triamcinolone acetonide 40 MG/ML injection   Commonly known as:  KENALOG-40   Used for:  Acute pain of right knee        Dose:  40 mg   1 mL (40 mg) by INTRA-ARTICULAR route once for 1 dose   Quantity:  1 mL   Refills:  0            Where to get your medicines      Some of these will need a paper prescription and others can be bought over the counter.  Ask your nurse if you have questions.     You don't need a prescription for these medications     triamcinolone acetonide 40 MG/ML injection                Primary Care Provider Office Phone # Fax #    Esperanza Yang [phone]       [address] [facility] [address]        Equal Access to Services     OSCAR LORA : Hadii aad ku hadasho Soomaali, waaxda luqadaha, qaybta kaalmada adeegyada, waxay wayne hayshaina garcia . So [facility] [phone].    ATENCIÓN: Si habla español, tiene a garcia disposición servicios gratuitos de asistencia lingüística. Llame al [phone].    We comply with applicable federal civil rights laws and [place] laws. We do not discriminate on the basis of race, color, national origin, age, disability, sex, sexual orientation, or gender identity.            Thank you!     Thank you for choosing [facility] URGENT [facility]ARE  for your care. Our goal is always to provide you with excellent care. Hearing back from our patients is one way we can continue to improve our services. Please take a few minutes to complete the written survey that you may receive in the mail after your visit with us. Thank you!             Your Updated Medication List - Protect others around  you: Learn how to safely use, store and throw away your medicines at www.disposemymeds.org.          This list is accurate as of 5/19/18  6:39 PM.  Always use your most recent med list.                   Brand Name Dispense Instructions for use Diagnosis    aspirin 325 MG EC tablet     30 tablet    Take 1 tablet by mouth every 6 hours as needed for pain.        buPROPion 150 MG 12 hr tablet    WELLBUTRIN SR    180 tablet    Take 1 tablet (150 mg) by mouth 2 times daily    Anxiety       CENTRUM SILVER PO      Take 1 tablet by mouth daily.        fa-pyridoxine-cyancobalamin 2.5-25-2 MG Tabs per tablet    FOLBIC    180 each    Take 2 tablets by mouth daily    Peripheral polyneuropathy       * order for DME     1 Device    CPAP mask and supplies    JACKI (obstructive sleep apnea)       * order for DME     1 Units    New CPAP machine, mask/interface.    JACKI (obstructive sleep apnea)       * order for DME     1 Device    Auto-CPAP: Max 10 cm H2O Min 8 cm H2O  Continuous  Lifetime need and heated humidity.    JACKI (obstructive sleep apnea)       triamcinolone acetonide 40 MG/ML injection    KENALOG-40    1 mL    1 mL (40 mg) by INTRA-ARTICULAR route once for 1 dose    Acute pain of right knee       vitamin D 2000 units tablet     100 tablet    Take 2,000 Units by mouth daily    Vitamin D deficiency       * Notice:  This list has 3 medication(s) that are the same as other medications prescribed for you. Read the directions carefully, and ask your doctor or other care provider to review them with you.

## 2018-07-10 DIAGNOSIS — F41.9 ANXIETY: ICD-10-CM

## 2018-07-10 RX ORDER — BUPROPION HYDROCHLORIDE 150 MG/1
TABLET, EXTENDED RELEASE ORAL
Qty: 180 TABLET | Refills: 0 | Status: SHIPPED | OUTPATIENT
Start: 2018-07-10 | End: 2018-12-19

## 2018-07-10 NOTE — TELEPHONE ENCOUNTER
"Requested Prescriptions   Pending Prescriptions Disp Refills     buPROPion (WELLBUTRIN SR) 150 MG 12 hr tablet [Pharmacy Med Name: BUPROPION HCL ER (SR) 150MG TB12]  Last Written Prescription Date:  5/24/2017  Last Fill Quantity: 180,  # refills: 3   Last office visit: 5/24/2017 with prescribing provider:     Future Office Visit:   180 tablet 2     Sig: TAKE ONE TABLET BY MOUTH TWICE A DAY    SSRIs Protocol Failed    7/10/2018  9:00 AM       Failed - Recent (12 mo) or future (30 days) visit within the authorizing provider's specialty    Patient had office visit in the last 12 months or has a visit in the next 30 days with authorizing provider or within the authorizing provider's specialty.  See \"Patient Info\" tab in inbasket, or \"Choose Columns\" in Meds & Orders section of the refill encounter.           Passed - Medication is Bupropion    If the medication is Bupropion (Wellbutrin), and the patient is taking for smoking cessation; OK to refill.         Passed - Patient is age 18 or older        "

## 2018-07-10 NOTE — TELEPHONE ENCOUNTER
"Requested Prescriptions   Pending Prescriptions Disp Refills     buPROPion (WELLBUTRIN SR) 150 MG 12 hr tablet [Pharmacy Med Name: BUPROPION HCL ER (SR) 150MG TB12]  Last Written Prescription Date:  7/10/18  Last Fill Quantity: 180,  # refills: 0   Last office visit: 5/24/2017 with prescribing provider:  Trey   Future Office Visit:     180 tablet 2     Sig: TAKE ONE TABLET BY MOUTH TWICE A DAY    SSRIs Protocol Failed    7/10/2018  2:46 PM       Failed - Recent (12 mo) or future (30 days) visit within the authorizing provider's specialty    Patient had office visit in the last 12 months or has a visit in the next 30 days with authorizing provider or within the authorizing provider's specialty.  See \"Patient Info\" tab in inbasket, or \"Choose Columns\" in Meds & Orders section of the refill encounter.           Passed - Medication is Bupropion    If the medication is Bupropion (Wellbutrin), and the patient is taking for smoking cessation; OK to refill.         Passed - Patient is age 18 or older          "

## 2018-07-11 RX ORDER — BUPROPION HYDROCHLORIDE 150 MG/1
TABLET, EXTENDED RELEASE ORAL
Qty: 180 TABLET | Refills: 2 | OUTPATIENT
Start: 2018-07-11

## 2018-08-03 ENCOUNTER — OFFICE VISIT (OUTPATIENT)
Dept: URGENT CARE | Facility: URGENT CARE | Age: 68
End: 2018-08-03
Payer: COMMERCIAL

## 2018-08-03 VITALS
TEMPERATURE: 98.6 F | DIASTOLIC BLOOD PRESSURE: 72 MMHG | SYSTOLIC BLOOD PRESSURE: 128 MMHG | HEART RATE: 87 BPM | OXYGEN SATURATION: 98 %

## 2018-08-03 DIAGNOSIS — G89.29 CHRONIC PAIN OF RIGHT KNEE: Primary | ICD-10-CM

## 2018-08-03 DIAGNOSIS — M25.561 CHRONIC PAIN OF RIGHT KNEE: Primary | ICD-10-CM

## 2018-08-03 PROCEDURE — 99214 OFFICE O/P EST MOD 30 MIN: CPT | Performed by: PHYSICIAN ASSISTANT

## 2018-08-03 RX ORDER — PREDNISONE 20 MG/1
40 TABLET ORAL DAILY
Qty: 10 TABLET | Refills: 0 | Status: ON HOLD | OUTPATIENT
Start: 2018-08-03 | End: 2019-02-23

## 2018-08-03 ASSESSMENT — ENCOUNTER SYMPTOMS
FEVER: 0
WOUND: 0
CHILLS: 0

## 2018-08-03 NOTE — MR AVS SNAPSHOT
After Visit Summary   8/3/2018    Keenan Sprague    MRN: [id number]           Patient Information     Date Of Birth          1950        Visit Information        Provider Department      8/3/2018 5:50 PM Aurelia Sanabria PA-C [facility]        Today's Diagnoses     Chronic pain of right knee    -  1       Follow-ups after your visit        Who to contact     If you have questions or need follow up information about today's clinic visit or your schedule please contact [facility] directly at [phone].  Normal or non-critical lab and imaging results will be communicated to you by Informativehart, letter or phone within 4 business days after the clinic has received the results. If you do not hear from us within 7 days, please contact the clinic through [employer] or phone. If you have a critical or abnormal lab result, we will notify you by phone as soon as possible.  Submit refill requests through "[employer] or call your pharmacy and they will forward the refill request to us. Please allow 3 business days for your refill to be completed.          Additional Information About Your Visit        MyChart Information     "[employer] gives you secure access to your electronic health record. If you see a primary care provider, you can also send messages to your care team and make appointments. If you have questions, please call your primary care clinic.  If you do not have a primary care provider, please call [phone] and they will assist you.        Care EveryWhere ID     This is your Care EveryWhere ID. This could be used by other organizations to access your [place] medical records  [id number]        Your Vitals Were     Pulse Temperature Pulse Oximetry             87 98.6  F (37  C) (Oral) 98%          Blood Pressure from Last 3 Encounters:   08/03/18 128/72   05/19/18 110/72   06/29/17 119/82    Weight from Last 3 Encounters:   05/24/17 284 lb 6.4 oz (129 kg)    03/02/17 280 lb (127 kg)   10/01/16 280 lb 14.4 oz (127.4 kg)              Today, you had the following     No orders found for display         Today's Medication Changes          These changes are accurate as of 8/3/18  8:56 PM.  If you have any questions, ask your nurse or doctor.               Start taking these medicines.        Dose/Directions    predniSONE 20 MG tablet   Commonly known as:  DELTASONE   Used for:  Chronic pain of right knee   Started by:  Aurelia Sanabria PA-C        Dose:  40 mg   Take 2 tablets (40 mg) by mouth daily for 5 days   Quantity:  10 tablet   Refills:  0            Where to get your medicines      These medications were sent to [facility] [address] [facility] [address] [facility] [address] [address] [facility] [address]     Phone:  [phone]     predniSONE 20 MG tablet                Primary Care Provider Office Phone # Fax #    Esperanza Yang, ELIDIA [phone]       [address]  [facility] [address]        Equal Access to Services     [facility]: Hadii aad ku hadasho Soomaali, waaxda luqadaha, qaybta kaalmada adeegyada, vivien garcia . So [facility] [phone].    ATENCIÓN: Si habla español, tiene a garcia disposición servicios gratuitos de asistencia lingüística. [facility] [phone].    We comply with applicable federal civil rights laws and [place] laws. We do not discriminate on the basis of race, color, national origin, age, disability, sex, sexual orientation, or gender identity.            Thank you!     Thank you for choosing [facility]  for your care. Our goal is always to provide you with excellent care. Hearing back from our patients is one way we can continue to improve our services. Please take a few minutes to complete the written survey that you may receive in the mail after your visit with us. Thank you!             Your Updated Medication List -  Protect others around you: Learn how to safely use, store and throw away your medicines at www.disposemymeds.org.          This list is accurate as of 8/3/18  8:56 PM.  Always use your most recent med list.                   Brand Name Dispense Instructions for use Diagnosis    aspirin 325 MG EC tablet     30 tablet    Take 1 tablet by mouth every 6 hours as needed for pain.        buPROPion 150 MG 12 hr tablet    WELLBUTRIN SR    180 tablet    TAKE ONE TABLET BY MOUTH TWICE A DAY    Anxiety       CENTRUM SILVER PO      Take 1 tablet by mouth daily.        fa-pyridoxine-cyancobalamin 2.5-25-2 MG Tabs per tablet    FOLBIC    180 each    Take 2 tablets by mouth daily    Peripheral polyneuropathy       * order for DME     1 Device    CPAP mask and supplies    JACKI (obstructive sleep apnea)       * order for DME     1 Units    New CPAP machine, mask/interface.    JACKI (obstructive sleep apnea)       * order for DME     1 Device    Auto-CPAP: Max 10 cm H2O Min 8 cm H2O  Continuous  Lifetime need and heated humidity.    JACKI (obstructive sleep apnea)       predniSONE 20 MG tablet    DELTASONE    10 tablet    Take 2 tablets (40 mg) by mouth daily for 5 days    Chronic pain of right knee       vitamin D 2000 units tablet     100 tablet    Take 2,000 Units by mouth daily    Vitamin D deficiency       * Notice:  This list has 3 medication(s) that are the same as other medications prescribed for you. Read the directions carefully, and ask your doctor or other care provider to review them with you.

## 2018-08-03 NOTE — PROGRESS NOTES
"SUBJECTIVE:   Keenan Sprague is a 67 year old male presenting with a chief complaint of   Chief Complaint   Patient presents with     Urgent Care     Musculoskeletal Problem     Rt knee pain. Had cortisone shot on May and helped for a while but now started acting up again. Wanting to have another shot.       He is an established patient of [facility].    He is presenting to urgent care [facility] with a complaint of right knee pain.  Denies any injury or trauma.  The pain has been ongoing for the past few months.  He was seen in urgent care a couple months ago and had a steroid injection in the right knee which helped some. In the past week the knee pain has started to flare-up again.  He is a  for [employer].  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  He otherwise denies any fevers or chills, any numbness or tingling in the right lower extremity.    Review of Systems   Constitutional: Negative for chills and fever.   Musculoskeletal:        Right knee pain   Skin: Negative for rash and wound.       Past Medical History:   Diagnosis Date     Embolism and thrombosis of unspecified site     left leg after ruptured Baker's cyst     Lipomatosis      Prostate cancer (H)     Seeds and external beam radiation     Family History   Problem Relation Age of Onset     Family History Negative Mother      \"In her 90's\", has had lumbar fusion     Cancer Father       age 33     Colon Polyps Other      Self     C.A.D. No family hx of      Diabetes No family hx of      Hypertension No family hx of      Cerebrovascular Disease No family hx of      Cancer - colorectal No family hx of      Crohn Disease No family hx of      Ulcerative Colitis No family hx of      Anesthesia Reaction No family hx of      Current Outpatient Prescriptions   Medication Sig Dispense Refill     buPROPion (WELLBUTRIN SR) 150 MG 12 hr tablet TAKE ONE TABLET BY MOUTH TWICE A  tablet 0     " predniSONE (DELTASONE) 20 MG tablet Take 2 tablets (40 mg) by mouth daily for 5 days 10 tablet 0     aspirin  MG tablet Take 1 tablet by mouth every 6 hours as needed for pain. 30 tablet 0     Cholecalciferol (VITAMIN D) 2000 UNITS tablet Take 2,000 Units by mouth daily (Patient not taking: Reported on 8/3/2018) 100 tablet 3     fa-pyridoxine-cyancobalamin (FOLBIC) 2.5-25-2 MG TABS Take 2 tablets by mouth daily (Patient not taking: Reported on 8/3/2018) 180 each 4     Multiple Vitamins-Minerals (CENTRUM SILVER PO) Take 1 tablet by mouth daily.       ORDER FOR DME Auto-CPAP:  Max 10 cm H2O  Min 8 cm H2O    Continuous    Lifetime need and heated humidity.    1 Device 0     ORDER FOR DME New CPAP machine, mask/interface. 1 Units 1     ORDER FOR DME CPAP mask and supplies 1 Device 0     Social History   Substance Use Topics     Smoking status: Current Every Day Smoker     Packs/day: 0.25     Years: 35.00     Types: Cigarettes     Last attempt to quit: 10/21/2010     Smokeless tobacco: Never Used     Alcohol use No      Comment: seldom       OBJECTIVE  /72 (BP Location: Right arm, Patient Position: Chair, Cuff Size: Adult Large)  Pulse 87  Temp 98.6  F (37  C) (Oral)  SpO2 98%    Physical Exam   Constitutional: He appears well-developed and well-nourished. No distress.   HENT:   Head: Normocephalic and atraumatic.   Right Ear: External ear normal.   Left Ear: External ear normal.   Eyes: Conjunctivae and EOM are normal.   Neck: Normal range of motion. Neck supple.   Pulmonary/Chest: Effort normal and breath sounds normal. No respiratory distress.   Musculoskeletal: Normal range of motion. He exhibits tenderness. He exhibits no edema or deformity.   Diffuse tenderness around the right knee, and joint line.  Range of motion is appropriate.  No erythema, no wounds.  Sensation and strength is intact.  No joint laxity noted.   Neurological: He is alert. He has normal reflexes.   Skin: Skin is warm and dry. He  is not diaphoretic.   Psychiatric: He has a normal mood and affect.   Nursing note and vitals reviewed.      Labs:  No results found for this or any previous visit (from the past 24 hour(s)).    X-Ray was not done.    ASSESSMENT:      ICD-10-CM    1. Chronic pain of right knee M25.561 predniSONE (DELTASONE) 20 MG tablet    G89.29         Medical Decision Making:    Differential Diagnosis:  MS Injury Pain: tendonitis, muscle strain and osteoarthritis    Serious Comorbid Conditions:  Adult:  None    PLAN:  Right knee pain, acute on chronic: Prednisone is prescribed.  Recommended elevation.  Discussed follow-up with orthopedics for ongoing symptoms.  Follow-up sooner if any worsening symptoms.  He agrees with the plan.      Followup:    If not improving or if condition worsens, follow up with your Primary Care Provider

## 2018-08-16 ENCOUNTER — DOCUMENTATION ONLY (OUTPATIENT)
Dept: SLEEP MEDICINE | Facility: CLINIC | Age: 68
End: 2018-08-16

## 2018-08-16 DIAGNOSIS — G47.33 OSA (OBSTRUCTIVE SLEEP APNEA): Primary | ICD-10-CM

## 2018-08-16 NOTE — PROGRESS NOTES
Patient was offered choice of vendor and chose [facility].  Patient Keenan Sprague was set up at [facility] on August 16, 2018. Patient received a Resmed AirSense 10 Auto. Pressures were set at 10-20 cm H2O.   Patient s ramp is 0 cm H2O for Off and FLEX/EPR is 3.  Patient received a [employer]ics Mask name: COMFORT BLUE GEL  Nasal mask Size Medium, heated tubing and heated humidifier.  Patient is not enrolled in the [facility] Program and does not need to meet compliance.   Lisa Chopra

## 2018-12-08 ENCOUNTER — TRANSFERRED RECORDS (OUTPATIENT)
Dept: HEALTH INFORMATION MANAGEMENT | Facility: CLINIC | Age: 68
End: 2018-12-08

## 2018-12-19 ENCOUNTER — OFFICE VISIT (OUTPATIENT)
Dept: INTERNAL MEDICINE | Facility: CLINIC | Age: 68
End: 2018-12-19
Payer: COMMERCIAL

## 2018-12-19 VITALS
WEIGHT: 297.2 LBS | SYSTOLIC BLOOD PRESSURE: 138 MMHG | HEIGHT: 74 IN | TEMPERATURE: 98.5 F | BODY MASS INDEX: 38.14 KG/M2 | DIASTOLIC BLOOD PRESSURE: 74 MMHG | RESPIRATION RATE: 16 BRPM | OXYGEN SATURATION: 97 % | HEART RATE: 74 BPM

## 2018-12-19 DIAGNOSIS — Z01.818 PREOP GENERAL PHYSICAL EXAM: ICD-10-CM

## 2018-12-19 DIAGNOSIS — Z01.818 PRE-OP EXAM: Primary | ICD-10-CM

## 2018-12-19 PROCEDURE — 85027 COMPLETE CBC AUTOMATED: CPT | Performed by: NURSE PRACTITIONER

## 2018-12-19 PROCEDURE — 36415 COLL VENOUS BLD VENIPUNCTURE: CPT | Performed by: NURSE PRACTITIONER

## 2018-12-19 PROCEDURE — 99214 OFFICE O/P EST MOD 30 MIN: CPT | Performed by: NURSE PRACTITIONER

## 2018-12-19 PROCEDURE — 93000 ELECTROCARDIOGRAM COMPLETE: CPT | Performed by: NURSE PRACTITIONER

## 2018-12-19 PROCEDURE — 80048 BASIC METABOLIC PNL TOTAL CA: CPT | Performed by: NURSE PRACTITIONER

## 2018-12-19 ASSESSMENT — MIFFLIN-ST. JEOR: SCORE: 2197.84
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
## 2018-12-20 ENCOUNTER — TELEPHONE (OUTPATIENT)
Dept: INTERNAL MEDICINE | Facility: CLINIC | Age: 68
End: 2018-12-20

## 2018-12-20 DIAGNOSIS — R94.31 ABNORMAL ELECTROCARDIOGRAM: Primary | ICD-10-CM

## 2018-12-20 LAB
ANION GAP SERPL CALCULATED.3IONS-SCNC: 6 MMOL/L (ref 3–14)
BUN SERPL-MCNC: 21 MG/DL (ref 7–30)
CALCIUM SERPL-MCNC: 9 MG/DL (ref 8.5–10.1)
CHLORIDE SERPL-SCNC: 108 MMOL/L (ref 94–109)
CO2 SERPL-SCNC: 25 MMOL/L (ref 20–32)
CREAT SERPL-MCNC: 1.02 MG/DL (ref 0.66–1.25)
ERYTHROCYTE [DISTWIDTH] IN BLOOD BY AUTOMATED COUNT: 13.5 % (ref 10–15)
GFR SERPL CREATININE-BSD FRML MDRD: 76 ML/MIN/{1.73_M2}
GLUCOSE SERPL-MCNC: 87 MG/DL (ref 70–99)
HCT VFR BLD AUTO: 43.1 % (ref 40–53)
HGB BLD-MCNC: 14.2 G/DL (ref 13.3–17.7)
MCH RBC QN AUTO: 31.5 PG (ref 26.5–33)
MCHC RBC AUTO-ENTMCNC: 32.9 G/DL (ref 31.5–36.5)
MCV RBC AUTO: 96 FL (ref 78–100)
PLATELET # BLD AUTO: 157 10E9/L (ref 150–450)
POTASSIUM SERPL-SCNC: 4.4 MMOL/L (ref 3.4–5.3)
RBC # BLD AUTO: 4.51 10E12/L (ref 4.4–5.9)
SODIUM SERPL-SCNC: 139 MMOL/L (ref 133–144)
WBC # BLD AUTO: 7 10E9/L (ref 4–11)

## 2018-12-20 NOTE — TELEPHONE ENCOUNTER
Please advise pt repeat EKG not warranted,any abnormal EKG needss cardiology eval and clearance for surgery.  Elective surgery can be postponed if necessary for cardiac work up and stablization.  Esperanza Yang CNP

## 2018-12-20 NOTE — TELEPHONE ENCOUNTER
Please notify pt after review preop EKG has some irregular beats and may have some ischemia ( lack of blood flow to heart muscle).  Needs cardiology consult and sign off for surgical clearance.  Referral done.    Esperanza Yang CNP

## 2018-12-20 NOTE — TELEPHONE ENCOUNTER
Patient returned call, read message below to him. Gave him the  phone number for cardiology. He would like to come back in to have another ekg done to see if the same results are seen. Message ok per Haily. Please return patients call at [phone], ok to leave a message. Routing high due to upcoming surgery.

## 2018-12-21 NOTE — TELEPHONE ENCOUNTER
Contacted patient, informed him Esperanza does not recommend repeating EKG - abnormal EKG requires cardiology consult and they would have to clear him for surgery.   He has appointment 1/4/19 at [facility]. He has left a message for his surgeon's regarding need for Cardiology appointment to be cleared for surgery. Patient asks what to expect from the Cardiology appointment. Informed him they will review the EKG, they may chose to repeat it in their office as well and they will likely order additional testing to ensure it is safe for him to have surgery.

## 2018-12-31 NOTE — TELEPHONE ENCOUNTER
FUTURE VISIT INFORMATION:    Date: 1/4/19    Time: 0900    Location: [facility]  REFERRAL INFORMATION:    Referring provider:  Dr. Esperanza Yang    Referring providers clinic:  [facility]    Reason for visit/diagnosis :  Cardiac Clearance    All records in epic.

## 2019-01-04 ENCOUNTER — OFFICE VISIT (OUTPATIENT)
Dept: CARDIOLOGY | Facility: CLINIC | Age: 69
End: 2019-01-04
Attending: INTERNAL MEDICINE
Payer: COMMERCIAL

## 2019-01-04 ENCOUNTER — HOSPITAL ENCOUNTER (OUTPATIENT)
Dept: CARDIOLOGY | Facility: CLINIC | Age: 69
Discharge: HOME | End: 2019-01-04
Attending: INTERNAL MEDICINE | Admitting: INTERNAL MEDICINE
Payer: COMMERCIAL

## 2019-01-04 ENCOUNTER — PRE VISIT (OUTPATIENT)
Dept: CARDIOLOGY | Facility: CLINIC | Age: 69
End: 2019-01-04

## 2019-01-04 VITALS
WEIGHT: 290.4 LBS | SYSTOLIC BLOOD PRESSURE: 147 MMHG | HEIGHT: 74 IN | OXYGEN SATURATION: 96 % | HEART RATE: 60 BPM | DIASTOLIC BLOOD PRESSURE: 85 MMHG | BODY MASS INDEX: 37.27 KG/M2

## 2019-01-04 DIAGNOSIS — R03.0 ELEVATED BP WITHOUT DIAGNOSIS OF HYPERTENSION: ICD-10-CM

## 2019-01-04 DIAGNOSIS — R60.0 BILATERAL LEG EDEMA: ICD-10-CM

## 2019-01-04 DIAGNOSIS — E78.5 HYPERLIPIDEMIA, UNSPECIFIED HYPERLIPIDEMIA TYPE: ICD-10-CM

## 2019-01-04 DIAGNOSIS — Z01.810 PRE-OPERATIVE CARDIOVASCULAR EXAMINATION: ICD-10-CM

## 2019-01-04 DIAGNOSIS — R94.31 ABNORMAL ELECTROCARDIOGRAM: ICD-10-CM

## 2019-01-04 DIAGNOSIS — Z01.810 PRE-OPERATIVE CARDIOVASCULAR EXAMINATION: Primary | ICD-10-CM

## 2019-01-04 DIAGNOSIS — G47.33 OSA (OBSTRUCTIVE SLEEP APNEA): ICD-10-CM

## 2019-01-04 PROCEDURE — 93306 TTE W/DOPPLER COMPLETE: CPT | Mod: 26 | Performed by: INTERNAL MEDICINE

## 2019-01-04 PROCEDURE — 93010 ELECTROCARDIOGRAM REPORT: CPT | Mod: ZP | Performed by: INTERNAL MEDICINE

## 2019-01-04 PROCEDURE — 99203 OFFICE O/P NEW LOW 30 MIN: CPT | Mod: ZP | Performed by: INTERNAL MEDICINE

## 2019-01-04 PROCEDURE — G0463 HOSPITAL OUTPT CLINIC VISIT: HCPCS | Mod: ZF

## 2019-01-04 PROCEDURE — 93306 TTE W/DOPPLER COMPLETE: CPT

## 2019-01-04 RX ORDER — ROSUVASTATIN CALCIUM 20 MG/1
20 TABLET, COATED ORAL DAILY
Qty: 90 TABLET | Refills: 3 | Status: ON HOLD | OUTPATIENT
Start: 2019-01-04 | End: 2019-02-23

## 2019-01-04 SDOH — HEALTH STABILITY: MENTAL HEALTH: HOW OFTEN DO YOU HAVE 6 OR MORE DRINKS ON ONE OCCASION?: NEVER

## 2019-01-04 SDOH — HEALTH STABILITY: MENTAL HEALTH: HOW OFTEN DO YOU HAVE A DRINK CONTAINING ALCOHOL?: 2-4 TIMES A MONTH

## 2019-01-04 SDOH — HEALTH STABILITY: MENTAL HEALTH: HOW MANY STANDARD DRINKS CONTAINING ALCOHOL DO YOU HAVE ON A TYPICAL DAY?: 1 OR 2

## 2019-01-04 ASSESSMENT — PAIN SCALES - GENERAL: PAINLEVEL: MODERATE PAIN (4)

## 2019-01-04 ASSESSMENT — MIFFLIN-ST. JEOR: SCORE: 2167
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
## 2019-01-04 NOTE — PROGRESS NOTES
Cardiac Testing: Patient given instructions regarding  echocardiogram . Discussed purpose, preparation, procedure and when to expect results reported back to the patient. Patient demonstrated understanding of this information and agreed to call with further questions or concerns.  Med Reconcile: Reviewed and verified all current medications with the patient. The updated medication list was printed and given to the patient.  New Medication: Patient was educated regarding newly prescribed medication, including discussion of  the indication, administration, side effects, and when to report to MD or RN. Patient demonstrated understanding of this information and agreed to call with further questions or concerns.  Patient stated he understood all health information given and agreed to call with further questions or concerns.
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
## 2019-01-04 NOTE — PATIENT INSTRUCTIONS
You were seen today in the Cardiovascular Clinic at the [facility].     Cardiology Providers you saw during your visit: Dr. Zamudio     Diagnosis:   Encounter Diagnoses   Name Primary?     Pre-operative cardiovascular examination Yes     Abnormal electrocardiogram      Bilateral leg edema      JACKI (obstructive sleep apnea)      Elevated BP without diagnosis of hypertension      Hyperlipidemia, unspecified hyperlipidemia type         Results: Discussed with you today EKG      Orders:   Orders Placed This Encounter   Procedures     EKG 12-lead, tracing only (Same Day)     Echocardiogram Complete       Current Medication List  Current Outpatient Medications   Medication Sig Dispense Refill     aspirin (ASA) 81 MG tablet Take 1 tablet (81 mg) by mouth daily       aspirin  MG tablet Take 1 tablet by mouth every 6 hours as needed for pain. 30 tablet 0     ORDER FOR DME Auto-CPAP:  Max 10 cm H2O  Min 8 cm H2O    Continuous    Lifetime need and heated humidity.    1 Device 0     ORDER FOR DME New CPAP machine, mask/interface. 1 Units 1     ORDER FOR DME CPAP mask and supplies 1 Device 0     rosuvastatin (CRESTOR) 20 MG tablet Take 1 tablet (20 mg) by mouth daily 90 tablet 3         Medications Discontinued:  There are no discontinued medications.     The 10-year ASCVD risk score ([place] DC Jr., et al., 2013) is: 24.6%    Values used to calculate the score:      Age: 68 years      Sex: Male      Is Non- : No      Diabetic: No      Tobacco smoker: Yes      Systolic Blood Pressure: 147 mmHg      Is BP treated: No      HDL Cholesterol: 51 mg/dL      Total Cholesterol: 231 mg/dL        Recommendations:   1. Echocardiogram today   2. Aspirin/statin therapy for prevention of coronary events  3. Blood pressure measurements repeated and followed with PCP    Follow-up: As needed with cardiology         Please feel free to call me with any questions or concerns.       Zhou Grimes LPN  "     Questions: [phone].   First press #1 for the [employer] and then press #3 for \"Medical Questions\" to reach us Cardiology Nurses.     [phone].   First press #1 for the University and then press #1      On Call Cardiologist for after hours or on weekends: [phone]   option #4 and ask to speak to the on-call Cardiologist.          If you need a medication refill please contact your pharmacy.  Please allow 3 business days for your refill to be completed.  ________________________________________________________________________________________________________________________________        "

## 2019-01-04 NOTE — NURSING NOTE
Chief Complaint   Patient presents with     New Patient      cardiac clearance for rotator cuff sx which is scheduled in January     Vitals were taken and medications were reconciled.    Ky Ramirez    9:05 AM

## 2019-01-07 LAB — INTERPRETATION ECG - MUSE: NORMAL

## 2019-01-07 NOTE — RESULT ENCOUNTER NOTE
Called patient, conveyed results. He requested results faxed to [facility] at [phone] attn Dr. Evans Harris.  This was completed.  Called to verify receipt of fax; left message with cardiology phone number with request to call if fax not received.

## 2019-01-11 ENCOUNTER — NURSE TRIAGE (OUTPATIENT)
Dept: NURSING | Facility: CLINIC | Age: 69
End: 2019-01-11

## 2019-01-11 PROCEDURE — 99283 EMERGENCY DEPT VISIT LOW MDM: CPT

## 2019-01-12 ENCOUNTER — APPOINTMENT (OUTPATIENT)
Dept: GENERAL RADIOLOGY | Facility: CLINIC | Age: 69
End: 2019-01-12
Payer: COMMERCIAL

## 2019-01-12 ENCOUNTER — HOSPITAL ENCOUNTER (EMERGENCY)
Facility: CLINIC | Age: 69
Discharge: HOME | End: 2019-01-12
Attending: EMERGENCY MEDICINE | Admitting: EMERGENCY MEDICINE
Payer: COMMERCIAL

## 2019-01-12 VITALS
WEIGHT: 290 LBS | HEIGHT: 74 IN | OXYGEN SATURATION: 96 % | DIASTOLIC BLOOD PRESSURE: 83 MMHG | RESPIRATION RATE: 16 BRPM | SYSTOLIC BLOOD PRESSURE: 145 MMHG | BODY MASS INDEX: 37.22 KG/M2 | TEMPERATURE: 98.4 F | HEART RATE: 60 BPM

## 2019-01-12 DIAGNOSIS — K59.00 CONSTIPATION, UNSPECIFIED CONSTIPATION TYPE: ICD-10-CM

## 2019-01-12 PROCEDURE — 25000132 ZZH RX MED GY IP 250 OP 250 PS 637: Performed by: EMERGENCY MEDICINE

## 2019-01-12 PROCEDURE — 74018 RADEX ABDOMEN 1 VIEW: CPT

## 2019-01-12 RX ADMIN — DOCUSATE SODIUM 286 ML: 50 LIQUID ORAL at 01:40

## 2019-01-12 ASSESSMENT — ENCOUNTER SYMPTOMS
ABDOMINAL PAIN: 1
VOMITING: 0
ABDOMINAL DISTENTION: 1
CONSTIPATION: 1

## 2019-01-12 ASSESSMENT — MIFFLIN-ST. JEOR: SCORE: 2165.18

## 2019-01-12 NOTE — TELEPHONE ENCOUNTER
Keenan had recent surgery for rotator cuff, and had not taken his Senakot , he has not had a Bowel movement in 4 days, and feels the urge sensation , but cannot release stool. He denies abdominal pain , or rectal pain . He had recently taken a dose of Pepto  Bismol. Cautioned against taking too much quickly and creating cramping pain, and to follow up with  A cristal care provider if no benefit from advise.He agrees to be seen if pain in abdomen occurs, or if he has no joão out with recommendations   Reason for Disposition    Last bowel movement (BM) > 4 days ago    Additional Information    Negative: [1] Abdominal pain is main symptom AND [2] adult male    Negative: [1] Abdominal pain is main symptom AND [2] adult female    Negative: Rectal bleeding or blood in stool is main symptom    Negative: Rectal pain or itching is main symptom    Negative: Patient sounds very sick or weak to the triager    Negative: [1] Vomiting AND [2] abdomen looks much more swollen than usual    Negative: [1] Vomiting AND [2] contains bile (green color)    Negative: [1] Constant abdominal pain AND [2] present > 2 hours    Negative: [1] Sudden onset rectal pain (straining, rectal pressure or fullness) AND [2] NOT better after SITZ bath, suppository or enema    Negative: [1] Intermittent mild abdominal pain AND [2] fever    Negative: Abdomen is more swollen than usual    Protocols used: CONSTIPATION-ADULT-AH

## 2019-01-12 NOTE — ED AVS SNAPSHOT
[facility]  [address]  [facility] [address]  Phone:  [phone]  Fax:  [phone]                                    Keenan Sprague   MRN: [id number]    Department:  [facility]   Date of Visit:  1/11/2019           After Visit Summary Signature Page    I have received my discharge instructions, and my questions have been answered. I have discussed any challenges I see with this plan with the nurse or doctor.    ..........................................................................................................................................  Patient/Patient Representative Signature      ..........................................................................................................................................  Patient Representative Print Name and Relationship to Patient    ..................................................               ................................................  Date                                   Time    ..........................................................................................................................................  Reviewed by Signature/Title    ...................................................              ..............................................  Date                                               Time          22EPIC Rev 08/18

## 2019-01-12 NOTE — ED PROVIDER NOTES
"  History     Chief Complaint:  Constipation    HPI   Keenan Sprague is a 68 year old male who presents with constipation. The patient states that he had a right rotator cuff surgery 3 days ago. He was given prescription pain medications and states that since he has become constipated. The patient reports having diffuse abdominal pain along with distention. He did take pepto bismol and magnesium citrate without improvement. The patient has never had constipation in the past. He denies any vomiting. He has no prior abdominal surgeries.     Allergies:  Ibuprofen     Medications:    Aspirin   Crestor     Past Medical History:    Lipomatosis   Prostate Cancer  Embolism and thrombosis     Past Surgical History:    Bone graft for a small left hand fracture   Lipoma surgery   Tonsillectomy   Open reduction internal fixation clavicle   Open reduction internal fixation wrist     Family History:    Cancer-Father    Social History:  Marital Status:   Presents to the ED alone  Tobacco Use: Current daily smoker, 0.25 PPD.   Alcohol Use: Yes  PCP: Esperanza Yang      Review of Systems   Gastrointestinal: Positive for abdominal distention, abdominal pain and constipation. Negative for vomiting.   All other systems reviewed and are negative.    Physical Exam   First Vitals:  BP: (!) 155/100  Pulse: 68  Temp: 98.4  F (36.9  C)  Resp: 20  Height: 188 cm (6' 2\")  Weight: 131.5 kg (290 lb)  SpO2: 96 %      Physical Exam  General: Patient is alert and interactive when I enter the room  Head:  The scalp, face, and head appear normal  Eyes:  Conjunctivae are normal  ENT:    The nose is normal    Pinnae are normal    External acoustic canals are normal  Neck:  Trachea midline  CV:  Pulses are normal    Resp:  No respiratory distress   Abdomen:      Soft, mild tenderness, non-distended  Musc:  Normal muscular tone    No major joint effusions    No asymmetric leg swelling  Skin:  No rash or lesions noted  Neuro:  Speech is " normal and fluent. Face is symmetric.     Moving all extremities well.   Psych: Awake. Alert.  Normal affect.  Appropriate interactions.    Emergency Department Course   Imaging:  Abdomen x-ray, 1 view:   1. Several air-fluid levels are present in the colon. This is  nonspecific, but could relate to gastroenteritis.  2. No convincing evidence of bowel obstruction.  3. A small amount of stool is present in the distal descending and  rectosigmoid colon.  Report per radiology.   Radiographic findings were communicated with the patient who voiced understanding of the findings.    Interventions:   (0140) Citrate, mineral oil, sodium phosphate, 286 mLs, IA    Emergency Department Course:  Nursing notes and vitals reviewed.  (0020) I performed an exam of the patient as documented above.    The patient was sent for an abdomen x-ray while in the emergency department, findings above.    Findings and plan explained to the patient. Patient discharged home with instructions regarding supportive care, medications, and reasons to return. The importance of close follow-up was reviewed.        Impression & Plan    Medical Decision Making:  Keenan Sprague is a 68 year old male who presents for evaluation for decreased stool output without signs of serious intraabdominal problems. Signs and symptoms are consistent with constipation. There are no signs of obstruction nor other intraabdominal catastrophe.  X-ray revealed no signs of bowel obstruction.  Patient was given an enema with minimal improvement so a fecal disimpaction was done with results.  A another enema was attempted and patient had a large stool and felt much improved.  Improved enough to go home.  Patient discharged.      Diagnosis:    ICD-10-CM    1. Constipation, unspecified constipation type K59.00      Disposition:  discharged to home        Yani ZAVALA, am serving as a scribe on 1/12/2019 at 12:20 AM to personally document services performed by Dr. Haque  based on my observations and the provider's statements to me.    1/11/2019   [facility] EMERGENCY DEPARTMENT       Seelna Haque MD  01/12/19 1070

## 2019-01-17 ENCOUNTER — TRANSFERRED RECORDS (OUTPATIENT)
Dept: HEALTH INFORMATION MANAGEMENT | Facility: CLINIC | Age: 69
End: 2019-01-17

## 2019-02-21 ENCOUNTER — TRANSFERRED RECORDS (OUTPATIENT)
Dept: HEALTH INFORMATION MANAGEMENT | Facility: CLINIC | Age: 69
End: 2019-02-21

## 2019-02-22 ENCOUNTER — APPOINTMENT (OUTPATIENT)
Dept: GENERAL RADIOLOGY | Facility: CLINIC | Age: 69
DRG: 175 | End: 2019-02-22
Attending: EMERGENCY MEDICINE
Payer: COMMERCIAL

## 2019-02-22 ENCOUNTER — HOSPITAL ENCOUNTER (INPATIENT)
Facility: CLINIC | Age: 69
LOS: 3 days | Discharge: HOME | DRG: 175 | End: 2019-02-26
Attending: EMERGENCY MEDICINE | Admitting: INTERNAL MEDICINE
Payer: COMMERCIAL

## 2019-02-22 ENCOUNTER — APPOINTMENT (OUTPATIENT)
Dept: CT IMAGING | Facility: CLINIC | Age: 69
DRG: 175 | End: 2019-02-22
Attending: EMERGENCY MEDICINE
Payer: COMMERCIAL

## 2019-02-22 DIAGNOSIS — I26.99 OTHER PULMONARY EMBOLISM WITHOUT ACUTE COR PULMONALE, UNSPECIFIED CHRONICITY (H): ICD-10-CM

## 2019-02-22 DIAGNOSIS — I26.99 BILATERAL PULMONARY EMBOLISM (H): Primary | ICD-10-CM

## 2019-02-22 LAB
ANION GAP SERPL CALCULATED.3IONS-SCNC: 10 MMOL/L (ref 3–14)
BASOPHILS # BLD AUTO: 0.1 10E9/L (ref 0–0.2)
BASOPHILS NFR BLD AUTO: 0.4 %
BUN SERPL-MCNC: 18 MG/DL (ref 7–30)
CALCIUM SERPL-MCNC: 8.5 MG/DL (ref 8.5–10.1)
CHLORIDE SERPL-SCNC: 102 MMOL/L (ref 94–109)
CO2 SERPL-SCNC: 22 MMOL/L (ref 20–32)
CREAT SERPL-MCNC: 1.04 MG/DL (ref 0.66–1.25)
D DIMER PPP FEU-MCNC: 5.4 UG/ML FEU (ref 0–0.5)
DIFFERENTIAL METHOD BLD: ABNORMAL
EOSINOPHIL # BLD AUTO: 0.1 10E9/L (ref 0–0.7)
EOSINOPHIL NFR BLD AUTO: 0.5 %
ERYTHROCYTE [DISTWIDTH] IN BLOOD BY AUTOMATED COUNT: 13.1 % (ref 10–15)
FLUAV+FLUBV AG SPEC QL: NEGATIVE
FLUAV+FLUBV AG SPEC QL: NEGATIVE
GFR SERPL CREATININE-BSD FRML MDRD: 74 ML/MIN/{1.73_M2}
GLUCOSE SERPL-MCNC: 115 MG/DL (ref 70–99)
HCT VFR BLD AUTO: 42.6 % (ref 40–53)
HGB BLD-MCNC: 14.2 G/DL (ref 13.3–17.7)
IMM GRANULOCYTES # BLD: 0.1 10E9/L (ref 0–0.4)
IMM GRANULOCYTES NFR BLD: 0.4 %
LYMPHOCYTES # BLD AUTO: 1.2 10E9/L (ref 0.8–5.3)
LYMPHOCYTES NFR BLD AUTO: 9.6 %
MCH RBC QN AUTO: 30.3 PG (ref 26.5–33)
MCHC RBC AUTO-ENTMCNC: 33.3 G/DL (ref 31.5–36.5)
MCV RBC AUTO: 91 FL (ref 78–100)
MONOCYTES # BLD AUTO: 1 10E9/L (ref 0–1.3)
MONOCYTES NFR BLD AUTO: 7.9 %
NEUTROPHILS # BLD AUTO: 9.8 10E9/L (ref 1.6–8.3)
NEUTROPHILS NFR BLD AUTO: 81.2 %
NRBC # BLD AUTO: 0 10*3/UL
NRBC BLD AUTO-RTO: 0 /100
NT-PROBNP SERPL-MCNC: 169 PG/ML (ref 0–900)
PLATELET # BLD AUTO: 166 10E9/L (ref 150–450)
POTASSIUM SERPL-SCNC: 3.8 MMOL/L (ref 3.4–5.3)
RBC # BLD AUTO: 4.69 10E12/L (ref 4.4–5.9)
SODIUM SERPL-SCNC: 134 MMOL/L (ref 133–144)
SPECIMEN SOURCE: NORMAL
TROPONIN I SERPL-MCNC: <0.015 UG/L (ref 0–0.04)
WBC # BLD AUTO: 12.1 10E9/L (ref 4–11)

## 2019-02-22 PROCEDURE — 25000128 H RX IP 250 OP 636: Performed by: EMERGENCY MEDICINE

## 2019-02-22 PROCEDURE — 71046 X-RAY EXAM CHEST 2 VIEWS: CPT

## 2019-02-22 PROCEDURE — 84484 ASSAY OF TROPONIN QUANT: CPT | Performed by: EMERGENCY MEDICINE

## 2019-02-22 PROCEDURE — 71260 CT THORAX DX C+: CPT

## 2019-02-22 PROCEDURE — 93005 ELECTROCARDIOGRAM TRACING: CPT

## 2019-02-22 PROCEDURE — 83880 ASSAY OF NATRIURETIC PEPTIDE: CPT | Performed by: EMERGENCY MEDICINE

## 2019-02-22 PROCEDURE — 99285 EMERGENCY DEPT VISIT HI MDM: CPT | Mod: 25

## 2019-02-22 PROCEDURE — 85025 COMPLETE CBC W/AUTO DIFF WBC: CPT | Performed by: EMERGENCY MEDICINE

## 2019-02-22 PROCEDURE — 87804 INFLUENZA ASSAY W/OPTIC: CPT | Performed by: EMERGENCY MEDICINE

## 2019-02-22 PROCEDURE — 85379 FIBRIN DEGRADATION QUANT: CPT | Performed by: EMERGENCY MEDICINE

## 2019-02-22 PROCEDURE — 80048 BASIC METABOLIC PNL TOTAL CA: CPT | Performed by: EMERGENCY MEDICINE

## 2019-02-22 RX ORDER — IOPAMIDOL 755 MG/ML
500 INJECTION, SOLUTION INTRAVASCULAR ONCE
Status: COMPLETED | OUTPATIENT
Start: 2019-02-22 | End: 2019-02-22

## 2019-02-22 RX ADMIN — SODIUM CHLORIDE 90 ML: 9 INJECTION, SOLUTION INTRAVENOUS at 23:53

## 2019-02-22 RX ADMIN — IOPAMIDOL 83 ML: 755 INJECTION, SOLUTION INTRAVENOUS at 23:53

## 2019-02-23 ENCOUNTER — APPOINTMENT (OUTPATIENT)
Dept: CARDIOLOGY | Facility: CLINIC | Age: 69
DRG: 175 | End: 2019-02-23
Attending: INTERNAL MEDICINE
Payer: COMMERCIAL

## 2019-02-23 PROBLEM — I26.99 BILATERAL PULMONARY EMBOLISM (H): Status: ACTIVE | Noted: 2019-02-23

## 2019-02-23 LAB
LACTATE BLD-SCNC: 0.5 MMOL/L (ref 0.7–2)
RADIOLOGIST FLAGS: ABNORMAL

## 2019-02-23 PROCEDURE — 93325 DOPPLER ECHO COLOR FLOW MAPG: CPT | Mod: 26 | Performed by: INTERNAL MEDICINE

## 2019-02-23 PROCEDURE — 93308 TTE F-UP OR LMTD: CPT | Mod: 26 | Performed by: INTERNAL MEDICINE

## 2019-02-23 PROCEDURE — 93010 ELECTROCARDIOGRAM REPORT: CPT | Performed by: INTERNAL MEDICINE

## 2019-02-23 PROCEDURE — 25000128 H RX IP 250 OP 636: Performed by: INTERNAL MEDICINE

## 2019-02-23 PROCEDURE — 36415 COLL VENOUS BLD VENIPUNCTURE: CPT | Performed by: INTERNAL MEDICINE

## 2019-02-23 PROCEDURE — 93321 DOPPLER ECHO F-UP/LMTD STD: CPT | Mod: 26 | Performed by: INTERNAL MEDICINE

## 2019-02-23 PROCEDURE — 99207 ZZC CDG-MDM COMPONENT: MEETS LOW - DOWN CODED: CPT | Performed by: INTERNAL MEDICINE

## 2019-02-23 PROCEDURE — 40000275 ZZH STATISTIC RCP TIME EA 10 MIN

## 2019-02-23 PROCEDURE — 25000128 H RX IP 250 OP 636: Performed by: EMERGENCY MEDICINE

## 2019-02-23 PROCEDURE — 25000132 ZZH RX MED GY IP 250 OP 250 PS 637: Performed by: INTERNAL MEDICINE

## 2019-02-23 PROCEDURE — 83605 ASSAY OF LACTIC ACID: CPT | Performed by: INTERNAL MEDICINE

## 2019-02-23 PROCEDURE — 96372 THER/PROPH/DIAG INJ SC/IM: CPT

## 2019-02-23 PROCEDURE — 25000132 ZZH RX MED GY IP 250 OP 250 PS 637: Performed by: EMERGENCY MEDICINE

## 2019-02-23 PROCEDURE — 12000000 ZZH R&B MED SURG/OB

## 2019-02-23 PROCEDURE — 99222 1ST HOSP IP/OBS MODERATE 55: CPT | Mod: AI | Performed by: INTERNAL MEDICINE

## 2019-02-23 PROCEDURE — 40000264 ECHOCARDIOGRAM LIMITED

## 2019-02-23 PROCEDURE — 93005 ELECTROCARDIOGRAM TRACING: CPT

## 2019-02-23 PROCEDURE — 25800030 ZZH RX IP 258 OP 636: Performed by: INTERNAL MEDICINE

## 2019-02-23 PROCEDURE — 25500064 ZZH RX 255 OP 636: Performed by: INTERNAL MEDICINE

## 2019-02-23 RX ORDER — AMOXICILLIN 250 MG
1 CAPSULE ORAL 2 TIMES DAILY PRN
Status: DISCONTINUED | OUTPATIENT
Start: 2019-02-23 | End: 2019-02-26 | Stop reason: HOSPADM

## 2019-02-23 RX ORDER — ACETAMINOPHEN 325 MG/1
325 TABLET ORAL EVERY 4 HOURS PRN
Status: DISCONTINUED | OUTPATIENT
Start: 2019-02-23 | End: 2019-02-24

## 2019-02-23 RX ORDER — ACETAMINOPHEN 325 MG/1
650 TABLET ORAL EVERY 4 HOURS PRN
Status: DISCONTINUED | OUTPATIENT
Start: 2019-02-23 | End: 2019-02-23

## 2019-02-23 RX ORDER — ONDANSETRON 2 MG/ML
4 INJECTION INTRAMUSCULAR; INTRAVENOUS EVERY 6 HOURS PRN
Status: DISCONTINUED | OUTPATIENT
Start: 2019-02-23 | End: 2019-02-26 | Stop reason: HOSPADM

## 2019-02-23 RX ORDER — ACETAMINOPHEN 500 MG
500 TABLET ORAL EVERY 4 HOURS PRN
Status: DISCONTINUED | OUTPATIENT
Start: 2019-02-23 | End: 2019-02-23

## 2019-02-23 RX ORDER — ONDANSETRON 4 MG/1
4 TABLET, ORALLY DISINTEGRATING ORAL EVERY 6 HOURS PRN
Status: DISCONTINUED | OUTPATIENT
Start: 2019-02-23 | End: 2019-02-26 | Stop reason: HOSPADM

## 2019-02-23 RX ORDER — HYDROCODONE BITARTRATE AND ACETAMINOPHEN 5; 325 MG/1; MG/1
1 TABLET ORAL EVERY 4 HOURS PRN
Status: DISCONTINUED | OUTPATIENT
Start: 2019-02-23 | End: 2019-02-26 | Stop reason: HOSPADM

## 2019-02-23 RX ORDER — NALOXONE HYDROCHLORIDE 0.4 MG/ML
.1-.4 INJECTION, SOLUTION INTRAMUSCULAR; INTRAVENOUS; SUBCUTANEOUS
Status: DISCONTINUED | OUTPATIENT
Start: 2019-02-23 | End: 2019-02-26 | Stop reason: HOSPADM

## 2019-02-23 RX ORDER — SODIUM CHLORIDE 9 MG/ML
INJECTION, SOLUTION INTRAVENOUS CONTINUOUS
Status: DISCONTINUED | OUTPATIENT
Start: 2019-02-23 | End: 2019-02-24

## 2019-02-23 RX ORDER — AMOXICILLIN 250 MG
2 CAPSULE ORAL 2 TIMES DAILY PRN
Status: DISCONTINUED | OUTPATIENT
Start: 2019-02-23 | End: 2019-02-26 | Stop reason: HOSPADM

## 2019-02-23 RX ADMIN — ACETAMINOPHEN 500 MG: 500 TABLET, FILM COATED ORAL at 00:31

## 2019-02-23 RX ADMIN — SODIUM CHLORIDE: 9 INJECTION, SOLUTION INTRAVENOUS at 02:53

## 2019-02-23 RX ADMIN — ACETAMINOPHEN 650 MG: 325 TABLET, FILM COATED ORAL at 07:57

## 2019-02-23 RX ADMIN — RIVAROXABAN 15 MG: 15 TABLET, FILM COATED ORAL at 19:00

## 2019-02-23 RX ADMIN — HYDROCODONE BITARTRATE AND ACETAMINOPHEN 1 TABLET: 5; 325 TABLET ORAL at 14:28

## 2019-02-23 RX ADMIN — ACETAMINOPHEN 325 MG: 325 TABLET, FILM COATED ORAL at 20:56

## 2019-02-23 RX ADMIN — SODIUM CHLORIDE: 9 INJECTION, SOLUTION INTRAVENOUS at 11:40

## 2019-02-23 RX ADMIN — SODIUM CHLORIDE: 9 INJECTION, SOLUTION INTRAVENOUS at 21:30

## 2019-02-23 RX ADMIN — ENOXAPARIN SODIUM 135 MG: 150 INJECTION SUBCUTANEOUS at 08:00

## 2019-02-23 RX ADMIN — HUMAN ALBUMIN MICROSPHERES AND PERFLUTREN 3 ML: 10; .22 INJECTION, SOLUTION INTRAVENOUS at 13:00

## 2019-02-23 RX ADMIN — ENOXAPARIN SODIUM 132 MG: 150 INJECTION SUBCUTANEOUS at 01:17

## 2019-02-23 ASSESSMENT — ACTIVITIES OF DAILY LIVING (ADL)
ADLS_ACUITY_SCORE: 11
ADLS_ACUITY_SCORE: 13
ADLS_ACUITY_SCORE: 13
BATHING: 0-->INDEPENDENT
COGNITION: 0 - NO COGNITION ISSUES REPORTED
RETIRED_COMMUNICATION: 0-->UNDERSTANDS/COMMUNICATES WITHOUT DIFFICULTY
RETIRED_EATING: 0-->INDEPENDENT
ADLS_ACUITY_SCORE: 13
ADLS_ACUITY_SCORE: 13
SWALLOWING: 0-->SWALLOWS FOODS/LIQUIDS WITHOUT DIFFICULTY
AMBULATION: 0-->INDEPENDENT
DRESS: 0-->INDEPENDENT
FALL_HISTORY_WITHIN_LAST_SIX_MONTHS: NO
TRANSFERRING: 0-->INDEPENDENT
TOILETING: 0-->INDEPENDENT

## 2019-02-23 ASSESSMENT — MIFFLIN-ST. JEOR: SCORE: 2160.65

## 2019-02-23 NOTE — PHARMACY-CONSULT NOTE
Pharmacy Consult for NOAC Coverage    Xarelto:  $60/mo. Upon receipt of RX Discharge Pharmacy can provide copay savings card to reduce this to $10/mo.  Eliquis:  $60/mo. Upon receipt of RX Discharge Pharmacy can provide copay savings card to reduce this to $10/mo.    Discharge pharmacy may be able to provide coupon for first month of Xarelto or Eliquis for free.    Enoxaparin 135mg (150mg syringes) x 10 syringes:  $51.84    Jantoven: < $5/mo      Manish Melvin, PharmD./PGY-1 Resident  [phone]

## 2019-02-23 NOTE — ED PROVIDER NOTES
History     Chief Complaint:  Cough & Shortness of Breath    HPI   Keenan Sprague is a 68 year old male s/p rotator cuff surgery on 1/9 with a history of embolism and thrombosis who presents with a cough and shortness of breath. The patient reports that for the past 2.5 days he has had a cough that produces clear sputum with associated shortness of breath. He adds that he also has right sided chest pain and right flank pain with coughing. The patient states he has also had chills, body aches, and swelling in his legs and adds that EMS told him there was some blood streaked into his sputum en route to the ED. The patient denies any known fevers, recent falls, history of heart problems, asthma or COPD. He notes that he uses CPAP at home and states that he quit smoking 3 days ago after smoking for a long time.     PE/DVT RISK FACTORS:  Sex:    Male  Hormones:   No  Tobacco:   Former  Cancer:   Yes  Travel:   No  Surgery:   Rotator Cuff on 1/9/19  Other immobilization: No  Personal history:  Yes  Family history:  No    Allergies:  Ibuprofen     Medications:    Aspirin 81 mg tablet  Aspirin  mg tablet  Crestor    Past Medical History:    Embolism and thrombosis of unspecified site  Lipomatosis  Prostate Cancer  JACKI  Anxiety    Past Surgical History:    Bone Graft for left hand fracture  Insert Radiation Seeds Prostate  Lipoma surgery  Open reduction internal fixation clavicle  Open reduction internal fixation wrist  Tonsillectomy  Colon polyps    Family History:    Cancer    Social History:  Smoking Status: Former Smoker (quit 3 days ago)  Alcohol Use: Yes  Patient presents with family.  Marital Status:       Review of Systems   Constitutional: Positive for chills. Negative for fever.   Respiratory: Positive for cough and shortness of breath.    Cardiovascular: Positive for chest pain and leg swelling.   Genitourinary: Positive for flank pain.   Musculoskeletal: Positive for myalgias.   All other systems  reviewed and are negative.    Physical Exam     Patient Vitals for the past 24 hrs:   BP Temp Temp src Pulse Heart Rate Resp SpO2 Weight   02/23/19 0130 152/83 -- -- 74 -- -- 95 % --   02/23/19 0100 139/80 -- -- 89 -- -- 97 % --   02/23/19 0038 -- -- -- -- -- -- 96 % --   02/23/19 0037 129/72 -- -- 79 -- -- -- --   02/23/19 0032 -- -- -- -- -- -- -- 131.8 kg (290 lb 9.1 oz)   02/22/19 2200 -- -- -- -- 77 20 95 % --   02/22/19 2145 -- -- -- -- -- 20 -- --   02/22/19 2130 -- -- -- -- 83 18 96 % --   02/22/19 2112 160/66 98.7  F (37.1  C) Oral 89 -- 19 95 % --     Physical Exam  General: Patient is alert and interactive when I enter the room  Head:  The scalp, face, and head appear normal  Eyes:  Conjunctivae are normal  ENT:    The nose is normal    Pinnae are normal    External acoustic canals are normal  Neck:  Trachea midline  CV:  Pulses are normal, RRR.    Resp:  Mild tachypnea, crackles on the right, no respiratory distress   Abdomen:      Soft, non-tender, non-distended  Musc:  Normal muscular tone    No major joint effusions    No asymmetric leg swelling  Skin:  No rash or lesions noted  Neuro:  Speech is normal and fluent. Face is symmetric.     Moving all extremities well.   Psych: Awake. Alert.  Normal affect.  Appropriate interactions.    Emergency Department Course     ECG (0:32:49):  Rate 78 bpm. AL interval 180 ms. QRS duration 138 ms. QT/QTc 390/444 ms. P-R-T axes 40 11 138.   Sinus rhythm with premature atrial complexes.  Left bundle branch block.  Abnormal ECG.  Interpreted at 0032 by Selena Haque MD.    Imaging:  Radiographic findings were communicated with the patient and family who voiced understanding of the findings.    XR Chest 2 Views  Alveolar infiltrates in both lower lobes more on the right  than on the left. Slight elevation of the right diaphragm. Mild  cardiomegaly, normal pulmonary vascularity.  As read by radiology.    CT Chest Pulmonary Embolism w Contrast  Bilateral pulmonary  emboli, right greater than left.    [Critical Result: Bilateral pulmonary embolus.]    Finding was identified on 2/23/2019 12:07 AM.     Dr. Haque was contacted by me on 2/23/2019 12:13 AM and verbalized  understanding of the critical result.   As read by radiology.    Laboratory:    Influenza A/B antigen: Both Negative    NT probnp inpatient: 169    2159: Troponin I: <0.015    D dimer: 5.4 (H)    BMP: Glucose 115 (H), o/w AWNL (Creatinine 1.04)    CBC: WBC 12.1 (H), o/w AWNL (HGB 14.2, )    Interventions:    0031: Tylenol 500 mg PO  0117: Lovenox 132 mg Subcutaneous given    Emergency Department Course:  Past medical records, nursing notes, and vitals reviewed.  2154: I performed an exam of the patient and obtained history, as documented above.    Blood was drawn.    The patient was sent for an X-ray and CT scan while in the emergency department, findings above.    0013: I discussed the case with Dr. Thomas of radiology regarding the patient's imaging results.    0019: Rechecked the patient, findings and plan explained to the patient, who consents to admission. Discussed the patient with Dr. Dickinson, who will admit the patient to a monitored bed for further observation, evaluation, and treatment.    Impression & Plan      Medical Decision Making:  Keenan Sprague is a 68 year old gentleman who presents with shortness of breath and chest pain. His lungs were actually fairly clear to auscultation. He was mildly hypoxic prior to arrival. He is otherwise hemodynamically stable. He is certainly at risk for PE given his recent surgery. He does have a pretty harsh sounding cough so pneumonia is considered. He is afebrile. Chest X-ray revealed bilateral infiltrates. EKG was unremarkable, troponin was negative, BNP was negative. We did do a CTPE study, which unfortunately revealed a large PE, right greater than left. No signs of saddle embolus. I discussed the patient with hospitalist, will do Lovenox given his normal  creatinine. Lovenox given. No symptoms of a DVT. This is likely postsurgical PE. Patient admitted to cardiac tele in stable condition.     Diagnosis:    ICD-10-CM    1. Other pulmonary embolism without acute cor pulmonale, unspecified chronicity (H) I26.99        Disposition:  Admitted to hospital.    Daphney Quiñones  2/22/2019   [facility] EMERGENCY DEPARTMENT  I, Daphney Quiñones, am serving as a scribe at 9:54 PM on 2/22/2019 to document services personally performed by Selena Haque MD based on my observations and the provider's statements to me.        Selena Haque MD  02/23/19 6516

## 2019-02-23 NOTE — ED NOTES
Bed: ED14  Expected date:   Expected time:   Means of arrival: Ambulance  Comments:  A596. 68M. SOB

## 2019-02-23 NOTE — PLAN OF CARE
Orientation: Alert and oriented x 4  VSS. 96% on 2 L NC.   Tele:Sr w/ BBB and PACs & PVCs. HR 74.   LS: diminished  GI:  Passing gas. No BM. Denies N/V.   : Adequate urine output.   Skin: clean and dry. Had rotator cuff surgery on 1/9. Shoulder tender, doesn't use much.  Activity: Independent. SBA. Pt slept comfortably throughout shift.   Pain: 4/10. Used ice packs for shoulder pain.  Plan: Continue with current cares. Hx of prostate cancer. Admitted on 2/22 w/ SOB, scan showed bilateral pulmonary emboli, greater in R side. Family was present at bedside. WBC 12.1, D Dimer 5.4. Has baseline neuropathy in feet. To start Lovenox therapy. No discharge plans made yet.

## 2019-02-23 NOTE — PHARMACY-ADMISSION MEDICATION HISTORY
Admission medication history interview status for this patient is complete. See [facility] admission navigator for allergy information, prior to admission medications and immunization status.     Medication history interview source(s):Patient  Medication history resources (including written lists, pill bottles, clinic record):None  Primary pharmacy: [place], [place] - [address]    Changes made to PTA medication list:  Added: none  Deleted: aspirin 81 mg (does take full strength PRN pain), rosuvastatin  Changed: none    Prior to Admission medications    Medication Sig Last Dose Taking? Auth Provider   aspirin  MG tablet Take 1 tablet by mouth every 6 hours as needed for pain. Past Month at Unknown time Yes Camryn Begum MD   ORDER FOR DME Auto-CPAP:  Max 10 cm H2O  Min 8 cm H2O    Continuous    Lifetime need and heated humidity.      Goltz, Bennett Ezra, PA-C   ORDER FOR DME New CPAP machine, mask/interface.   Floyd Zaman MD   ORDER FOR DME CPAP mask and supplies   Floyd Zaman MD

## 2019-02-23 NOTE — ED NOTES
[facility]  ED Nurse Handoff Report    Keenan Sprague is a 68 year old male   ED Chief complaint: Shortness of Breath  . ED Diagnosis:   Final diagnoses:   None     Allergies:   Allergies   Allergen Reactions     Ibuprofen        Code Status: Full Code  Activity level - Baseline/Home:  Independent. Activity Level - Current:   Independent. Lift room needed: No. Bariatric: No   Needed: No   Isolation: No. Infection: Not Applicable.     Vital Signs:   Vitals:    02/22/19 2112 02/22/19 2130 02/22/19 2145 02/22/19 2200   BP: 160/66      Pulse: 89      Resp: 19 18 20 20   Temp: 98.7  F (37.1  C)      TempSrc: Oral      SpO2: 95% 96%  95%       Cardiac Rhythm:  ,   Cardiac  Cardiac Rhythm: Normal sinus rhythm  Pain level: 0-10 Pain Scale: 2  Patient confused: No. Patient Falls Risk: No.   Elimination Status: Has voided   Patient Report - Initial Complaint: Worsening SOB and cough for 3 days.  Had rotator cuff surgery 1/9. Focused Assessment: Respiratory - Respiratory WDL: -WDL except; all Rhythm/Pattern, Respiratory: shortness of breath Cough Frequency: frequent Cough Type: productive   Tests Performed: Lab, CT, CXR Abnormal Results:   XR Chest 2 Views   Final Result   IMPRESSION: Alveolar infiltrates in both lower lobes more on the right   than on the left. Slight elevation of the right diaphragm. Mild   cardiomegaly, normal pulmonary vascularity.      RAYMOND ESPOSITO MD      CT Chest Pulmonary Embolism w Contrast    (Results Pending)     Labs Ordered and Resulted from Time of ED Arrival Up to the Time of Departure from the ED   CBC WITH PLATELETS DIFFERENTIAL - Abnormal; Notable for the following components:       Result Value    WBC 12.1 (*)     Absolute Neutrophil 9.8 (*)     All other components within normal limits   BASIC METABOLIC PANEL - Abnormal; Notable for the following components:    Glucose 115 (*)     All other components within normal limits   D DIMER QUANTITATIVE - Abnormal;  Notable for the following components:    D Dimer 5.4 (*)     All other components within normal limits   TROPONIN I   NT PROBNP INPATIENT   INFLUENZA A/B ANTIGEN      Treatments provided: See MAR  Family Comments: family present  OBS brochure/video discussed/provided to patient:  Yes  ED Medications:   Medications   0.9% sodium chloride BOLUS (0 mLs Intravenous Stopped 2/22/19 7198)   iopamidol (ISOVUE-370) solution 500 mL (83 mLs Intravenous Given 2/22/19 5377)     Drips infusing:  Yes  For the majority of the shift, the patient's behavior Green. Interventions performed were none.     Severe Sepsis OR Septic Shock Diagnosis Present: No      ED Nurse Name/Phone Number: Hayes RAJEEV Watson,   11:56 PM  RECEIVING UNIT ED HANDOFF REVIEW    Above ED Nurse Handoff Report was reviewed: Yes  Reviewed by: George Rodriguez on February 23, 2019 at 1:27 AM

## 2019-02-23 NOTE — PLAN OF CARE
A&Ox4. VSS. Nasal cannula 2 L -sats 96% - SOB reported. Pain reported in Right side of the chest - tylenol given. Mild pain in right shoulder from rotator cuff surgery on 1/9 - only able to sleep in a sitting position. DVT tx - Lovenox. Diet - regular. IVF NS 100mL/hr. Tele - SR, BBB with occasional PACs and PVCs. Discharge date TBD.
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
## 2019-02-24 LAB
ANION GAP SERPL CALCULATED.3IONS-SCNC: 5 MMOL/L (ref 3–14)
BASOPHILS # BLD AUTO: 0.1 10E9/L (ref 0–0.2)
BASOPHILS NFR BLD AUTO: 0.6 %
BUN SERPL-MCNC: 15 MG/DL (ref 7–30)
CALCIUM SERPL-MCNC: 8.6 MG/DL (ref 8.5–10.1)
CHLORIDE SERPL-SCNC: 104 MMOL/L (ref 94–109)
CO2 SERPL-SCNC: 25 MMOL/L (ref 20–32)
CREAT SERPL-MCNC: 0.84 MG/DL (ref 0.66–1.25)
DIFFERENTIAL METHOD BLD: ABNORMAL
EOSINOPHIL # BLD AUTO: 0.2 10E9/L (ref 0–0.7)
EOSINOPHIL NFR BLD AUTO: 2.3 %
ERYTHROCYTE [DISTWIDTH] IN BLOOD BY AUTOMATED COUNT: 13 % (ref 10–15)
GFR SERPL CREATININE-BSD FRML MDRD: >90 ML/MIN/{1.73_M2}
GLUCOSE SERPL-MCNC: 118 MG/DL (ref 70–99)
HCT VFR BLD AUTO: 37.6 % (ref 40–53)
HGB BLD-MCNC: 12.4 G/DL (ref 13.3–17.7)
IMM GRANULOCYTES # BLD: 0 10E9/L (ref 0–0.4)
IMM GRANULOCYTES NFR BLD: 0.5 %
LYMPHOCYTES # BLD AUTO: 1.1 10E9/L (ref 0.8–5.3)
LYMPHOCYTES NFR BLD AUTO: 13.2 %
MCH RBC QN AUTO: 30.8 PG (ref 26.5–33)
MCHC RBC AUTO-ENTMCNC: 33 G/DL (ref 31.5–36.5)
MCV RBC AUTO: 93 FL (ref 78–100)
MONOCYTES # BLD AUTO: 0.7 10E9/L (ref 0–1.3)
MONOCYTES NFR BLD AUTO: 8.4 %
NEUTROPHILS # BLD AUTO: 6.2 10E9/L (ref 1.6–8.3)
NEUTROPHILS NFR BLD AUTO: 75 %
NRBC # BLD AUTO: 0 10*3/UL
NRBC BLD AUTO-RTO: 0 /100
PLATELET # BLD AUTO: 163 10E9/L (ref 150–450)
POTASSIUM SERPL-SCNC: 3.9 MMOL/L (ref 3.4–5.3)
RBC # BLD AUTO: 4.03 10E12/L (ref 4.4–5.9)
SODIUM SERPL-SCNC: 134 MMOL/L (ref 133–144)
WBC # BLD AUTO: 8.2 10E9/L (ref 4–11)

## 2019-02-24 PROCEDURE — 40000893 ZZH STATISTIC PT IP EVAL DEFER

## 2019-02-24 PROCEDURE — 99231 SBSQ HOSP IP/OBS SF/LOW 25: CPT | Performed by: INTERNAL MEDICINE

## 2019-02-24 PROCEDURE — 36415 COLL VENOUS BLD VENIPUNCTURE: CPT | Performed by: INTERNAL MEDICINE

## 2019-02-24 PROCEDURE — 80048 BASIC METABOLIC PNL TOTAL CA: CPT | Performed by: INTERNAL MEDICINE

## 2019-02-24 PROCEDURE — 99207 ZZC CDG-MDM COMPONENT: MEETS LOW - DOWN CODED: CPT | Performed by: INTERNAL MEDICINE

## 2019-02-24 PROCEDURE — 85025 COMPLETE CBC W/AUTO DIFF WBC: CPT | Performed by: INTERNAL MEDICINE

## 2019-02-24 PROCEDURE — 12000000 ZZH R&B MED SURG/OB

## 2019-02-24 PROCEDURE — 25000132 ZZH RX MED GY IP 250 OP 250 PS 637: Performed by: INTERNAL MEDICINE

## 2019-02-24 RX ORDER — ACETAMINOPHEN 500 MG
1000 TABLET ORAL 2 TIMES DAILY
Status: DISCONTINUED | OUTPATIENT
Start: 2019-02-24 | End: 2019-02-26 | Stop reason: HOSPADM

## 2019-02-24 RX ADMIN — HYDROCODONE BITARTRATE AND ACETAMINOPHEN 1 TABLET: 5; 325 TABLET ORAL at 06:31

## 2019-02-24 RX ADMIN — RIVAROXABAN 15 MG: 15 TABLET, FILM COATED ORAL at 18:24

## 2019-02-24 RX ADMIN — HYDROCODONE BITARTRATE AND ACETAMINOPHEN 1 TABLET: 5; 325 TABLET ORAL at 12:25

## 2019-02-24 RX ADMIN — RIVAROXABAN 15 MG: 15 TABLET, FILM COATED ORAL at 08:27

## 2019-02-24 RX ADMIN — HYDROCODONE BITARTRATE AND ACETAMINOPHEN 1 TABLET: 5; 325 TABLET ORAL at 01:02

## 2019-02-24 RX ADMIN — HYDROCODONE BITARTRATE AND ACETAMINOPHEN 1 TABLET: 5; 325 TABLET ORAL at 16:52

## 2019-02-24 RX ADMIN — HYDROCODONE BITARTRATE AND ACETAMINOPHEN 1 TABLET: 5; 325 TABLET ORAL at 20:55

## 2019-02-24 RX ADMIN — ACETAMINOPHEN 1000 MG: 500 TABLET, FILM COATED ORAL at 08:27

## 2019-02-24 RX ADMIN — ACETAMINOPHEN 1000 MG: 500 TABLET, FILM COATED ORAL at 20:55

## 2019-02-24 ASSESSMENT — ACTIVITIES OF DAILY LIVING (ADL)
ADLS_ACUITY_SCORE: 13

## 2019-02-24 NOTE — PLAN OF CARE
VSS except slight temp of 100.5, tylenol given, pt on 3 LPM NC for shallow respirations, A/OX4, SBA transfer, regular diet, LS dim, pt changed from Lovenox to Xeralto this evening, pt c/o 3/10 pain, NSR w/ PAC and PVC's on tele, no plan for discharge at this time.

## 2019-02-24 NOTE — PROGRESS NOTES
[facility]    Hospitalist Progress Note      Assessment & Plan   Keenan Sprague is a 68 year old male who was admitted on 2/22/2019. 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 Patient underwent 1/9/19 right rotator cuff repair and presented to emergency department with 2.5 days cough and shortness of breath. Patient was found to have bilateral PE and admitted for further evaluation and treatment.     Pulmonary emboli, bilateral  Acute respiratory failure with hypoxia  Patient remains hemodynamically stable.  Troponin unremarkable and normal BNP.  Patient initially started on treatment with lovenox.   Based on pharmacy review of pricing, patient elected to start Xarelto and this was started 2/23 evening.  Patient continues to require O2 and will continue to wean as tolerated.   Patient reporting pain secondary to PE. Have scheduled tylenol 1000 mg BID with PRN lortab.   Will discontinue IV fluids at this time and continue to monitor blood pressure.  Incentive spirometry ordered.   Review of outpatient records revealed multiple episodes of superficial vein thrombosis or superficial thrombophlebitis, but no history of DVT. Patient had been treated with short term anticoagulation in the past more out of an abundance of caution in hopes the SVT would not lead to DVT.   Suspect patient will be able to complete 3 months treatment for this provoked PE following orthopedic surgery with decrease in mobility without requiring further ongoing anticoagulation.   Patient will need to follow with primary care and ensure he is up to date on all age-appropriate cancer screenings.     1/9/19 right  rotator cuff repair   Patient to start outpatient PT for right shoulder after discharge.     FEN: Regular diet.    DVT Prophylaxis: Xarelto  Code Status: Full Code  Expected discharge: Anticipated hospital discharge in next 1-2 days depending on improvement in symptoms and decreased O2 requirements.     Mary Grace Conroy MD FACP  Hospitalist Service  [facility]  Text Page (7am - 6pm)      Interval History   Patient reports feeling improved today. States he is having easier time taking deep inspirations now. Continues to require 3L O2. Plans to ambulate this afternoon with nursing.   Otherwise no acute events.     -Data reviewed today: I reviewed all new labs and imaging results over the last 24 hours.       Physical Exam   Temp: 97.8  F (36.6  C) Temp src: Oral BP: 109/52 Pulse: 53 Heart Rate: 71 Resp: 16 SpO2: 98 % O2 Device: Nasal cannula Oxygen Delivery: 3 LPM  Vitals:    02/23/19 0032 02/23/19 0207   Weight: 131.8 kg (290 lb 9.1 oz) 131.1 kg (289 lb)     Vital Signs with Ranges  Temp:  [97.8  F (36.6  C)-100.5  F (38.1  C)] 97.8  F (36.6  C)  Pulse:  [53] 53  Heart Rate:  [67-73] 71  Resp:  [16-18] 16  BP: (100-113)/(38-57) 109/52  SpO2:  [95 %-98 %] 98 %  I/O last 3 completed shifts:  In: 480 [P.O.:480]  Out: -     Constitutional: Alert and oriented x3. Patient sitting up in bed in no acute distress. Patient's pain is improved by report and he was better able to participate in exam this morning.   HEENT: NCAT. EOMI. Moist oral mucosa.  Respiratory: Clear to auscultation bilaterally. No crackles or wheezes.  Cardiovascular: Regular rate and rhythm at time of exam with HR 71. No murmur. Bilateral lower extremity edema, 1+, L = R. No calf tenderness bilaterally.   GI: Soft, nontender, nondistended. Normoactive bowel sounds.   Musculoskeletal: No significant swelling noted to right arm. S/p recent right rotator cuff repair. Has not yet started PT and patient with limited strength and ROM. Otherwise  no gross deficits.  Neurologic: Alert and oriented x3. No focal neurologic deficits.       Medications     - MEDICATION INSTRUCTIONS -         acetaminophen  1,000 mg Oral BID     rivaroxaban ANTICOAGULANT  15 mg Oral BID w/meals       Data   Recent Labs   Lab 02/24/19  0622 02/22/19  2159   WBC 8.2 12.1*   HGB 12.4* 14.2   MCV 93 91    166    134   POTASSIUM 3.9 3.8   CHLORIDE 104 102   CO2 25 22   BUN 15 18   CR 0.84 1.04   ANIONGAP 5 10   KATHERYN 8.6 8.5   * 115*   TROPI  --  <0.015       No results found for this or any previous visit (from the past 24 hour(s)).

## 2019-02-24 NOTE — PROGRESS NOTES
Brief hospitalist update    Note that patient was admitted and already billed for hospitalist services this morning. Will not charge for this visit.    Seen this evening and still requiring 4L O2. Having considerable pain related to PE at this time. Otherwise no acute distress. Lungs are clear. Heart rate and rhythm are regular.     Echocardiogram completed and reviewed. No significant right heart strain.    Pharmacy reviewed anticoagulation options and patient has elected to start Xarelto. Treatment dose lovenox changed to Xarelto this evening.    Patient also notes that orthopedics recommend that he start PT following his 1/9/19 right rotator cuff repair. They recommended he start after appointment in past week, but he had not yet done so. Will consult PT.    Will continue to follow.    Mary Grace Conroy MD FACP  Hospitalist Service  [facility]  Text Page (7am - 6pm)

## 2019-02-24 NOTE — PLAN OF CARE
A&Ox4. VSS. Pain in right shoulder from rotator cuff surgery and in right side of the chest when inhaling - Norco and scheduled tylenol given. Pt reports SOB - nasal cannula 3L - sats mid 90's. Ambulated in hallway - O2 sats 94-96%. Tele - SR, BBB and frequent PACs and PVCs. DVT prophylaxis - Xarelto. Possible discharge 1-2 days.

## 2019-02-24 NOTE — PLAN OF CARE
Pt alert and oriented, makes her needs known. Pt needs encouragement to use narcotic pain medication. Pt with recent roator cuff surgery to right, limited range of motion. Tele reading SR with BBB.

## 2019-02-24 NOTE — PLAN OF CARE
PT: Orders received, chart reviewed. PT ordered as pt s/p rotator cuff repair in January. Typically this is not addressed in the IP setting. Will place order for OP PT. Please reorder IP PT if there is a concern with functional mobility or discharge planning.

## 2019-02-25 LAB
ANION GAP SERPL CALCULATED.3IONS-SCNC: 7 MMOL/L (ref 3–14)
BUN SERPL-MCNC: 14 MG/DL (ref 7–30)
CALCIUM SERPL-MCNC: 8.6 MG/DL (ref 8.5–10.1)
CHLORIDE SERPL-SCNC: 103 MMOL/L (ref 94–109)
CO2 SERPL-SCNC: 25 MMOL/L (ref 20–32)
CREAT SERPL-MCNC: 0.84 MG/DL (ref 0.66–1.25)
ERYTHROCYTE [DISTWIDTH] IN BLOOD BY AUTOMATED COUNT: 12.8 % (ref 10–15)
GFR SERPL CREATININE-BSD FRML MDRD: >90 ML/MIN/{1.73_M2}
GLUCOSE SERPL-MCNC: 107 MG/DL (ref 70–99)
HCT VFR BLD AUTO: 37.5 % (ref 40–53)
HGB BLD-MCNC: 12.6 G/DL (ref 13.3–17.7)
INTERPRETATION ECG - MUSE: NORMAL
MCH RBC QN AUTO: 30.9 PG (ref 26.5–33)
MCHC RBC AUTO-ENTMCNC: 33.6 G/DL (ref 31.5–36.5)
MCV RBC AUTO: 92 FL (ref 78–100)
PLATELET # BLD AUTO: 169 10E9/L (ref 150–450)
POTASSIUM SERPL-SCNC: 3.7 MMOL/L (ref 3.4–5.3)
RBC # BLD AUTO: 4.08 10E12/L (ref 4.4–5.9)
SODIUM SERPL-SCNC: 135 MMOL/L (ref 133–144)
WBC # BLD AUTO: 7 10E9/L (ref 4–11)

## 2019-02-25 PROCEDURE — 85027 COMPLETE CBC AUTOMATED: CPT | Performed by: INTERNAL MEDICINE

## 2019-02-25 PROCEDURE — 12000000 ZZH R&B MED SURG/OB

## 2019-02-25 PROCEDURE — 99207 ZZC CDG-MDM COMPONENT: MEETS LOW - DOWN CODED: CPT | Performed by: INTERNAL MEDICINE

## 2019-02-25 PROCEDURE — 80048 BASIC METABOLIC PNL TOTAL CA: CPT | Performed by: INTERNAL MEDICINE

## 2019-02-25 PROCEDURE — 36415 COLL VENOUS BLD VENIPUNCTURE: CPT | Performed by: INTERNAL MEDICINE

## 2019-02-25 PROCEDURE — 25000132 ZZH RX MED GY IP 250 OP 250 PS 637: Performed by: INTERNAL MEDICINE

## 2019-02-25 PROCEDURE — 99231 SBSQ HOSP IP/OBS SF/LOW 25: CPT | Performed by: INTERNAL MEDICINE

## 2019-02-25 RX ADMIN — HYDROCODONE BITARTRATE AND ACETAMINOPHEN 1 TABLET: 5; 325 TABLET ORAL at 21:40

## 2019-02-25 RX ADMIN — RIVAROXABAN 15 MG: 15 TABLET, FILM COATED ORAL at 17:25

## 2019-02-25 RX ADMIN — ACETAMINOPHEN 1000 MG: 500 TABLET, FILM COATED ORAL at 21:40

## 2019-02-25 RX ADMIN — Medication 1 MG: at 01:26

## 2019-02-25 RX ADMIN — RIVAROXABAN 15 MG: 15 TABLET, FILM COATED ORAL at 07:53

## 2019-02-25 RX ADMIN — ACETAMINOPHEN 1000 MG: 500 TABLET, FILM COATED ORAL at 07:54

## 2019-02-25 RX ADMIN — Medication 1 MG: at 21:40

## 2019-02-25 RX ADMIN — HYDROCODONE BITARTRATE AND ACETAMINOPHEN 1 TABLET: 5; 325 TABLET ORAL at 01:24

## 2019-02-25 ASSESSMENT — ACTIVITIES OF DAILY LIVING (ADL)
ADLS_ACUITY_SCORE: 13
ADLS_ACUITY_SCORE: 11
ADLS_ACUITY_SCORE: 13

## 2019-02-25 NOTE — PLAN OF CARE
A&O. Complains of right shoulder pain, PRN Norco given. MUNOZ. LS clear. O2 97% on 3 L. Independent with ambulation.

## 2019-02-25 NOTE — PLAN OF CARE
"Appears comfortable and undressed.  Ambulated in andrade and sat in lounge for much of am.on roomair.  States pain in chest and shoulder. About a \"2\".  Plans to stay another night, to be sure he will be safe at dischg.  Encourage pt to have someone bring in cpap machine at home as he said he slept little last night.  Tele SR with BBB      "

## 2019-02-25 NOTE — PLAN OF CARE
Pt is AOx4, up in room independently, VSS. Lungs clear bilaterally upon auscultation, c/o of SOB at rest and with activity, O2 sats mid 90's on 3L. PRN norco administered x2 for chest and right shoulder pain and helpful. Pt is voiding sufficient amounts of urine. Pt has a good appetite and is drinking adequate amounts of fluids. Will continue to monitor and encourage fluids.

## 2019-02-25 NOTE — PROGRESS NOTES
[facility]    Hospitalist Progress Note      Assessment & Plan   Keenan Sprague is a 68 year old male who was admitted on 2/22/2019. 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 Patient underwent 1/9/19 right rotator cuff repair and presented to emergency department with 2.5 days cough and shortness of breath. Patient was found to have bilateral PE and admitted for further evaluation and treatment.     Pulmonary emboli, bilateral  Acute respiratory failure with hypoxia  Patient remains hemodynamically stable.  Overnight was requiring supplemental O2 this morning has been off.  Breathing has improved somewhat.  Troponin unremarkable and normal BNP.  Patient initially started on treatment with lovenox.   Based on pharmacy review of pricing, patient elected to start Xarelto and this was started 2/23 evening.  Patient reporting pain secondary to PE. Have scheduled tylenol 1000 mg BID with PRN lortab.   Incentive spirometry ordered.   Review of outpatient records revealed multiple episodes of superficial vein thrombosis or superficial thrombophlebitis, but no history of DVT. Patient had been treated with short term anticoagulation in the past more out of an abundance of caution in hopes the SVT would not lead to DVT.   Suspect patient will be able to complete 3 months treatment for this provoked PE following orthopedic surgery with decrease in mobility without requiring further ongoing anticoagulation.   Patient will need to follow with primary care and ensure he is up to date on all age-appropriate cancer screenings.     1/9/19 right rotator cuff repair   Patient to start outpatient PT for  right shoulder after discharge.     FEN: Regular diet.    DVT Prophylaxis: Xarelto  Code Status: Full Code  Expected discharge: Anticipated hospital discharge in next 1-2 days depending on improvement in symptoms and decreased O2 requirements.     Maria C Mata MD   Hospitalist Service  [facility]  Text Page (7am - 6pm)      Interval History   Patient reports feeling improved today. States he is having easier time taking deep inspirations now. Continues to require 3L O2 at night however improved this morning..  We will check ambulatory O2 sats.  Otherwise no acute events.     -Data reviewed today: I reviewed all new labs and imaging results over the last 24 hours.       Physical Exam   Temp: 98.5  F (36.9  C) Temp src: Oral BP: 109/50   Heart Rate: 70 Resp: 20 SpO2: 95 % O2 Device: Nasal cannula Oxygen Delivery: 2 LPM  Vitals:    02/23/19 0032 02/23/19 0207   Weight: 131.8 kg (290 lb 9.1 oz) 131.1 kg (289 lb)     Vital Signs with Ranges  Temp:  [97.6  F (36.4  C)-99  F (37.2  C)] 98.5  F (36.9  C)  Heart Rate:  [63-70] 70  Resp:  [18-20] 20  BP: (100-125)/(44-51) 109/50  SpO2:  [94 %-97 %] 95 %  I/O last 3 completed shifts:  In: 920 [P.O.:920]  Out: -     Constitutional: Alert and oriented x3. Patient sitting up in bed in no acute distress. Patient's pain is improved by report and he was better able to participate in exam this morning.   HEENT: NCAT. EOMI. Moist oral mucosa.  Respiratory: Clear to auscultation bilaterally. No crackles or wheezes.  Cardiovascular: Regular rate and rhythm at time of exam with HR 71. No murmur. Bilateral lower extremity edema, 1+, L = R. No calf tenderness bilaterally.   GI: Soft, nontender, nondistended. Normoactive bowel sounds.   Musculoskeletal: No significant swelling noted to right arm. S/p recent right rotator cuff repair. Has not yet started PT and patient with limited strength and ROM. Otherwise no gross deficits.  Neurologic: Alert and oriented x3. No  focal neurologic deficits.       Medications     - MEDICATION INSTRUCTIONS -         acetaminophen  1,000 mg Oral BID     rivaroxaban ANTICOAGULANT  15 mg Oral BID w/meals       Data   Recent Labs   Lab 02/25/19  0638 02/24/19  0622 02/22/19  2159   WBC 7.0 8.2 12.1*   HGB 12.6* 12.4* 14.2   MCV 92 93 91    163 166    134 134   POTASSIUM 3.7 3.9 3.8   CHLORIDE 103 104 102   CO2 25 25 22   BUN 14 15 18   CR 0.84 0.84 1.04   ANIONGAP 7 5 10   KATHERYN 8.6 8.6 8.5   * 118* 115*   TROPI  --   --  <0.015       No results found for this or any previous visit (from the past 24 hour(s)).

## 2019-02-26 VITALS
TEMPERATURE: 98.5 F | WEIGHT: 289 LBS | RESPIRATION RATE: 20 BRPM | HEART RATE: 53 BPM | SYSTOLIC BLOOD PRESSURE: 131 MMHG | BODY MASS INDEX: 37.09 KG/M2 | DIASTOLIC BLOOD PRESSURE: 69 MMHG | HEIGHT: 74 IN | OXYGEN SATURATION: 96 %

## 2019-02-26 PROCEDURE — 25000132 ZZH RX MED GY IP 250 OP 250 PS 637: Performed by: INTERNAL MEDICINE

## 2019-02-26 PROCEDURE — 99239 HOSP IP/OBS DSCHRG MGMT >30: CPT | Performed by: INTERNAL MEDICINE

## 2019-02-26 RX ADMIN — ACETAMINOPHEN 1000 MG: 500 TABLET, FILM COATED ORAL at 08:07

## 2019-02-26 RX ADMIN — RIVAROXABAN 15 MG: 15 TABLET, FILM COATED ORAL at 08:06

## 2019-02-26 ASSESSMENT — ACTIVITIES OF DAILY LIVING (ADL)
ADLS_ACUITY_SCORE: 11

## 2019-02-26 NOTE — PLAN OF CARE
Pt  denies pain, is independent in the room .  VSS.  LS clear.  Possible discharge home today depending on improvement in symptoms and decrease O2 requirements.  Continue plan of care.

## 2019-02-26 NOTE — DISCHARGE SUMMARY
[facility]  Discharge Summary  Hospitalist      Date of Admission:  2/22/2019  Date of Discharge:  2/26/2019  Provider:  Maria C Mata MD  Date of Service (when I last saw the patient): 02/26/19      Primary Provider: Esperanza Yang          Discharge Diagnosis:   Discharge Diagnoses   Acute respiratory failure with hypoxia secondary to bilateral pulmonary emboli    Other medical issues:  Past Medical History:   Diagnosis Date     Embolism and thrombosis of unspecified site     left leg after ruptured Baker's cyst     Lipomatosis      Prostate cancer (H) 2007    Seeds and external beam radiation          History of Present Illness   Keenan Sprague is an 68 year old male who presented with cough and shortness of breath was found to have pulmonary embolism..  Please see the admission history and physical for full details.    Hospital Course     Keenan Sprague was admitted on 2/22/2019.  His PMH significant for remote prostate cancer (diagnosed 2008, treated with seed implants and external beam radiation) and history of recurrent superficial vein thrombus (2003, 2013) in greater saphenous vein left lower leg at proximal calf after rupture of Baker's cyst with thrombophlebitis (cannot find evidence of true DVT in past) previously treated with warfarin. Patient underwent 1/9/19 right rotator cuff repair and presented to emergency department with 2.5 days cough and shortness of breath. Patient was found to have bilateral PE and admitted for further evaluation and treatment.       The following problems were addressed during his hospitalization:    Pulmonary emboli, bilateral  Acute respiratory failure with hypoxia  Patient remains hemodynamically stable.  Initially required supplemental O2 now has been off supplemental O2..  Troponin unremarkable and normal BNP.  Patient initially started on treatment with lovenox.   Based on pharmacy review of pricing, patient elected to start Xarelto and this  was started 2/23 evening.  Patient reporting pain secondary to PE. Have scheduled tylenol 1000 mg BID with PRN lortab.   Incentive spirometry ordered.   Review of outpatient records revealed multiple episodes of superficial vein thrombosis or superficial thrombophlebitis, but no history of DVT. Patient had been treated with short term anticoagulation in the past more out of an abundance of caution in hopes the SVT would not lead to DVT.   Suspect patient will be able to complete 3 months treatment for this provoked PE following orthopedic surgery with decrease in mobility without requiring further ongoing anticoagulation.   Patient will need to follow with primary care and ensure he is up to date on all age-appropriate cancer screenings.      1/9/19 right rotator cuff repair   Patient to start outpatient PT for right shoulder after discharge.              Significant Results and Procedures   As noted above    Pending Results   Unresulted Labs Ordered in the Past 30 Days of this Admission     No orders found from 12/24/2018 to 2/23/2019.          Code Status   Full Code       Primary Care Physician   Esperanza Yang    Physical Exam   Temp: 98.5  F (36.9  C) Temp src: Oral BP: 131/69   Heart Rate: 77 Resp: 20 SpO2: 96 % O2 Device: None (Room air)    Vitals:    02/23/19 0032 02/23/19 0207   Weight: 131.8 kg (290 lb 9.1 oz) 131.1 kg (289 lb)     Vital Signs with Ranges  Temp:  [98.5  F (36.9  C)-100.5  F (38.1  C)] 98.5  F (36.9  C)  Heart Rate:  [68-84] 77  Resp:  [18-20] 20  BP: (120-131)/(52-74) 131/69  SpO2:  [93 %-96 %] 96 %  I/O last 3 completed shifts:  In: 440 [P.O.:440]  Out: -     Constitutional: Awake, alert, cooperative, no apparent distress, and appears stated age.  Respiratory: No increased work of breathing, good air exchange, clear to auscultation bilaterally, no crackles or wheezing.  Cardiovascular: Normal apical impulse,normal S1 and S2,   GI:  normal bowel sounds, soft, non-distended,  non-tender.      Discharge Disposition   Discharged to home    Consultations This Hospital Stay   PHARMACY TO DOSE HEPARIN  PHARMACY IP CONSULT  PHYSICAL THERAPY ADULT IP CONSULT  PHARMACY DISCHARGE EDUCATION BY PHARMACIST    Time Spent on this Encounter   I, Maria C Mata, personally saw the patient today and spent greater than 30 minutes discharging this patient.    Discharge Orders      [facility] HAND, AND CHIROPRACTIC REFERRAL      Reason for your hospital stay    Please refer to discontinue summary     Follow-up and recommended labs and tests     Follow up with primary care provider, Esperanza Yang, within 7 days for hospital follow- up and to follow up on results.  The following labs/tests are recommended: cbc.     Activity    Your activity upon discharge: activity as tolerated     Full Code     Diet    Follow this diet upon discharge: Orders Placed This Encounter      Regular Diet Adult     Discharge Medications   Current Discharge Medication List      START taking these medications    Details   !! rivaroxaban ANTICOAGULANT (XARELTO) 15 MG TABS tablet Take 1 tablet (15 mg) by mouth 2 times daily (with meals) for 18 days  Qty: 36 tablet, Refills: 0    Associated Diagnoses: Bilateral pulmonary embolism (H)      !! rivaroxaban ANTICOAGULANT (XARELTO) 20 MG TABS tablet Take 1 tablet (20 mg) by mouth daily (with dinner)  Qty: 30 tablet, Refills: 0    Associated Diagnoses: Bilateral pulmonary embolism (H)       !! - Potential duplicate medications found. Please discuss with provider.      CONTINUE these medications which have NOT CHANGED    Details   !! ORDER FOR DME Auto-CPAP:  Max 10 cm H2O  Min 8 cm H2O    Continuous    Lifetime need and heated humidity.     Qty: 1 Device, Refills: 0    Associated Diagnoses: JACKI (obstructive sleep apnea)      !! ORDER FOR DME New CPAP machine, mask/interface.  Qty: 1 Units, Refills: 1    Associated Diagnoses: JACKI (obstructive sleep apnea)      !! ORDER FOR DME CPAP mask  and supplies  Qty: 1 Device, Refills: 0    Associated Diagnoses: JACKI (obstructive sleep apnea)       !! - Potential duplicate medications found. Please discuss with provider.      STOP taking these medications       aspirin  MG tablet Comments:   Reason for Stopping:             Allergies   Allergies   Allergen Reactions     Ibuprofen      Data   Most Recent 3 CBC's:  Recent Labs   Lab Test 02/25/19  0638 02/24/19 0622 02/22/19 2159   WBC 7.0 8.2 12.1*   HGB 12.6* 12.4* 14.2   MCV 92 93 91    163 166      Most Recent 3 BMP's:  Recent Labs   Lab Test 02/25/19  0638 02/24/19  0622 02/22/19 2159    134 134   POTASSIUM 3.7 3.9 3.8   CHLORIDE 103 104 102   CO2 25 25 22   BUN 14 15 18   CR 0.84 0.84 1.04   ANIONGAP 7 5 10   KATHERYN 8.6 8.6 8.5   * 118* 115*     Most Recent 2 LFT's:  Recent Labs   Lab Test 01/21/16 0826 11/08/11  1910   AST 31 28   ALT 46 39   ALKPHOS 77 67   BILITOTAL 0.6 0.4     Most Recent INR's and Anticoagulation Dosing History:  Anticoagulation Dose History     Recent Dosing and Labs Latest Ref Rng & Units 7/24/2003 6/19/2007 10/8/2008    INR 0.86 - 1.14 1.80(H) 0.99 0.94        Most Recent 3 Troponin's:  Recent Labs   Lab Test 02/22/19 2159 11/08/11  2100 11/08/11  1910   TROPI <0.015 0.020 0.017     Most Recent Cholesterol Panel:  Recent Labs   Lab Test 01/21/16 0826   CHOL 231*   *   HDL 51   TRIG 69     Most Recent 6 Bacteria Isolates From Any Culture (See EPIC Reports for Culture Details):No lab results found.  Most Recent TSH, T4 and A1c Labs:  Recent Labs   Lab Test 01/21/16 0826   TSH 1.28   A1C 5.1     Results for orders placed or performed during the hospital encounter of 02/22/19   XR Chest 2 Views    Narrative    CHEST TWO VIEWS  2/22/2019 10:24 PM     HISTORY: Cough, shortness of breath.    COMPARISON: None.      Impression    IMPRESSION: Alveolar infiltrates in both lower lobes more on the right  than on the left. Slight elevation of the right  diaphragm. Mild  cardiomegaly, normal pulmonary vascularity.    RAYMOND ESPOSITO MD   CT Chest Pulmonary Embolism w Contrast     Value    Radiologist flags Bilateral pulmonary embolus. (AA)    Narrative    CT CHEST PULMONARY EMBOLISM W CONTRAST 2/23/2019 12:05 AM    HISTORY: Rotator cuff surgery. History of embolism and thrombosis.  Cough, short of breath. Right-sided chest pain.    COMPARISON: None.    TECHNIQUE:  Chest CT was performed following intravenous  administration of 83mL Isovue-370.  Radiation dose for this scan was  reduced using automated exposure control, adjustment of the mA and/or  kV according to patient size, or iterative reconstruction technique.    FINDINGS: Bilateral lower lobe pulmonary emboli, right greater than  left. Patchy airspace opacity in the right lower lobe, likely  pulmonary infarct.    No evidence of aortic dissection. Mild cardiomegaly. No pericardial  effusion. No evidence of mediastinal or hilar adenopathy.    Patchy airspace opacity in the right lower lobe, probably pulmonary  infarct given the size of the right main pulmonary arterial clot.  Apical emphysema. No pleural effusion.    No acute abnormality in the visualized upper abdomen.      Impression    IMPRESSION: Bilateral pulmonary emboli, right greater than left.    [Critical Result: Bilateral pulmonary embolus.]    Finding was identified on 2/23/2019 12:07 AM.     Dr. Haque was contacted by me on 2/23/2019 12:13 AM and verbalized  understanding of the critical result.     BRAYAN ALCALA MD           Disclaimer: This note consists of symbols derived from keyboarding, dictation and/or voice recognition software. As a result, there may be errors in the script that have gone undetected. Please consider this when interpreting information found in this chart.

## 2019-02-26 NOTE — PLAN OF CARE
Pt A/OX4, VS- Tmax 100.5- PRN tylenol given x1, RA, c/o rt. Shoulder pain 7/10- PRN Norco given x1, LS- clear, PIV LA SL, regular diet, up independent, ambulating in halls, calls appropriately, discharge tomorrow, continue POC.

## 2019-02-27 ENCOUNTER — OFFICE VISIT (OUTPATIENT)
Dept: INTERNAL MEDICINE | Facility: CLINIC | Age: 69
End: 2019-02-27
Payer: COMMERCIAL

## 2019-02-27 ENCOUNTER — TELEPHONE (OUTPATIENT)
Dept: INTERNAL MEDICINE | Facility: CLINIC | Age: 69
End: 2019-02-27

## 2019-02-27 VITALS
HEART RATE: 78 BPM | BODY MASS INDEX: 37.22 KG/M2 | HEIGHT: 74 IN | DIASTOLIC BLOOD PRESSURE: 64 MMHG | WEIGHT: 290 LBS | RESPIRATION RATE: 12 BRPM | TEMPERATURE: 98.5 F | OXYGEN SATURATION: 94 % | SYSTOLIC BLOOD PRESSURE: 114 MMHG

## 2019-02-27 DIAGNOSIS — Z23 NEED FOR VACCINATION WITH 13-POLYVALENT PNEUMOCOCCAL CONJUGATE VACCINE: ICD-10-CM

## 2019-02-27 DIAGNOSIS — C61 PROSTATE CANCER (H): ICD-10-CM

## 2019-02-27 DIAGNOSIS — Z09 HOSPITAL DISCHARGE FOLLOW-UP: Primary | ICD-10-CM

## 2019-02-27 DIAGNOSIS — G47.33 OSA (OBSTRUCTIVE SLEEP APNEA): ICD-10-CM

## 2019-02-27 DIAGNOSIS — Z12.5 SCREENING FOR PROSTATE CANCER: ICD-10-CM

## 2019-02-27 DIAGNOSIS — I26.99 BILATERAL PULMONARY EMBOLISM (H): ICD-10-CM

## 2019-02-27 DIAGNOSIS — E66.01 MORBID OBESITY (H): ICD-10-CM

## 2019-02-27 DIAGNOSIS — I74.9 EMBOLISM AND THROMBOSIS (H): ICD-10-CM

## 2019-02-27 LAB
ERYTHROCYTE [DISTWIDTH] IN BLOOD BY AUTOMATED COUNT: 13.2 % (ref 10–15)
HCT VFR BLD AUTO: 40.5 % (ref 40–53)
HGB BLD-MCNC: 13.2 G/DL (ref 13.3–17.7)
INTERPRETATION ECG - MUSE: NORMAL
MCH RBC QN AUTO: 30.1 PG (ref 26.5–33)
MCHC RBC AUTO-ENTMCNC: 32.6 G/DL (ref 31.5–36.5)
MCV RBC AUTO: 92 FL (ref 78–100)
PLATELET # BLD AUTO: 234 10E9/L (ref 150–450)
RBC # BLD AUTO: 4.39 10E12/L (ref 4.4–5.9)
WBC # BLD AUTO: 6.3 10E9/L (ref 4–11)

## 2019-02-27 PROCEDURE — 90471 IMMUNIZATION ADMIN: CPT | Performed by: INTERNAL MEDICINE

## 2019-02-27 PROCEDURE — 85027 COMPLETE CBC AUTOMATED: CPT | Performed by: INTERNAL MEDICINE

## 2019-02-27 PROCEDURE — 36415 COLL VENOUS BLD VENIPUNCTURE: CPT | Performed by: INTERNAL MEDICINE

## 2019-02-27 PROCEDURE — 99495 TRANSJ CARE MGMT MOD F2F 14D: CPT | Performed by: INTERNAL MEDICINE

## 2019-02-27 PROCEDURE — 90670 PCV13 VACCINE IM: CPT | Performed by: INTERNAL MEDICINE

## 2019-02-27 PROCEDURE — 84153 ASSAY OF PSA TOTAL: CPT | Performed by: INTERNAL MEDICINE

## 2019-02-27 ASSESSMENT — MIFFLIN-ST. JEOR: SCORE: 2165.18
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
## 2019-02-27 NOTE — NURSING NOTE
"Vital signs:  Temp: 98.5  F (36.9  C) Temp src: Oral BP: 114/64 Pulse: 78   Resp: 12 SpO2: 94 %     Height: 188 cm (6' 2\") Weight: 131.5 kg (290 lb)  Estimated body mass index is 37.23 kg/m  as calculated from the following:    Height as of this encounter: 1.88 m (6' 2\").    Weight as of this encounter: 131.5 kg (290 lb).          "

## 2019-02-27 NOTE — TELEPHONE ENCOUNTER
IP F/U    Date: 02/26/19  Diagnosis: Bilateral Pulmonary Embolism, Other Pulmonary Embolism Without Acute Cor Pulmonale, Unspecified Chronicity  Is patient active in care coordination? No  Was patient in TCU? No

## 2019-02-28 LAB — PSA SERPL-MCNC: 17.8 UG/L (ref 0–4)

## 2019-03-04 DIAGNOSIS — R97.20 ELEVATED PROSTATE SPECIFIC ANTIGEN (PSA): Primary | ICD-10-CM

## 2019-03-05 ENCOUNTER — OFFICE VISIT (OUTPATIENT)
Dept: UROLOGY | Facility: CLINIC | Age: 69
End: 2019-03-05
Payer: COMMERCIAL

## 2019-03-05 VITALS
HEIGHT: 74 IN | DIASTOLIC BLOOD PRESSURE: 78 MMHG | SYSTOLIC BLOOD PRESSURE: 136 MMHG | BODY MASS INDEX: 36.45 KG/M2 | WEIGHT: 284 LBS | OXYGEN SATURATION: 95 % | HEART RATE: 72 BPM

## 2019-03-05 DIAGNOSIS — C61 PROSTATE CANCER (H): Primary | ICD-10-CM

## 2019-03-05 DIAGNOSIS — R97.20 ELEVATED PROSTATE SPECIFIC ANTIGEN (PSA): ICD-10-CM

## 2019-03-05 LAB
ALBUMIN UR-MCNC: NEGATIVE MG/DL
APPEARANCE UR: CLEAR
BILIRUB UR QL STRIP: NEGATIVE
COLOR UR AUTO: YELLOW
GLUCOSE UR STRIP-MCNC: NEGATIVE MG/DL
HGB UR QL STRIP: NEGATIVE
KETONES UR STRIP-MCNC: NEGATIVE MG/DL
LEUKOCYTE ESTERASE UR QL STRIP: NEGATIVE
NITRATE UR QL: NEGATIVE
PH UR STRIP: 7 PH (ref 5–7)
PSA SERPL-MCNC: 34.4 NG/ML (ref 0–4)
RESIDUAL VOLUME (RV) (EXTERNAL): 13
SOURCE: NORMAL
SP GR UR STRIP: 1.02 (ref 1–1.03)
UROBILINOGEN UR STRIP-ACNC: 0.2 EU/DL (ref 0.2–1)

## 2019-03-05 PROCEDURE — 84153 ASSAY OF PSA TOTAL: CPT | Performed by: UROLOGY

## 2019-03-05 PROCEDURE — 51798 US URINE CAPACITY MEASURE: CPT | Performed by: UROLOGY

## 2019-03-05 PROCEDURE — 81003 URINALYSIS AUTO W/O SCOPE: CPT | Performed by: UROLOGY

## 2019-03-05 PROCEDURE — 99204 OFFICE O/P NEW MOD 45 MIN: CPT | Mod: 25 | Performed by: UROLOGY

## 2019-03-05 PROCEDURE — 36415 COLL VENOUS BLD VENIPUNCTURE: CPT | Performed by: UROLOGY

## 2019-03-05 ASSESSMENT — PAIN SCALES - GENERAL: PAINLEVEL: NO PAIN (0)

## 2019-03-05 ASSESSMENT — MIFFLIN-ST. JEOR: SCORE: 2137.97
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
## 2019-03-05 NOTE — NURSING NOTE
Chief Complaint   Patient presents with     Clinic Care Coordination - Follow-up     Pt with prostate cancer history here with elevated PSA of 17   PVR is 13mL  Sussy Rodriguez CMA

## 2019-03-06 ENCOUNTER — HOSPITAL ENCOUNTER (OUTPATIENT)
Dept: CT IMAGING | Facility: CLINIC | Age: 69
Discharge: HOME | End: 2019-03-06
Attending: UROLOGY | Admitting: UROLOGY
Payer: COMMERCIAL

## 2019-03-06 ENCOUNTER — HOSPITAL ENCOUNTER (OUTPATIENT)
Dept: NUCLEAR MEDICINE | Facility: CLINIC | Age: 69
Setting detail: NUCLEAR MEDICINE
End: 2019-03-06
Attending: UROLOGY
Payer: COMMERCIAL

## 2019-03-06 ENCOUNTER — HOSPITAL ENCOUNTER (OUTPATIENT)
Dept: NUCLEAR MEDICINE | Facility: CLINIC | Age: 69
Setting detail: NUCLEAR MEDICINE
Discharge: HOME | End: 2019-03-06
Attending: UROLOGY | Admitting: UROLOGY
Payer: COMMERCIAL

## 2019-03-06 DIAGNOSIS — C61 PROSTATE CANCER (H): ICD-10-CM

## 2019-03-06 PROCEDURE — 74177 CT ABD & PELVIS W/CONTRAST: CPT

## 2019-03-06 PROCEDURE — A9503 TC99M MEDRONATE: HCPCS | Performed by: UROLOGY

## 2019-03-06 PROCEDURE — 25000128 H RX IP 250 OP 636: Performed by: UROLOGY

## 2019-03-06 PROCEDURE — 25000125 ZZHC RX 250: Performed by: UROLOGY

## 2019-03-06 PROCEDURE — 34300033 ZZH RX 343: Performed by: UROLOGY

## 2019-03-06 PROCEDURE — 78306 BONE IMAGING WHOLE BODY: CPT

## 2019-03-06 RX ORDER — IOPAMIDOL 755 MG/ML
135 INJECTION, SOLUTION INTRAVASCULAR ONCE
Status: COMPLETED | OUTPATIENT
Start: 2019-03-06 | End: 2019-03-06

## 2019-03-06 RX ORDER — TC 99M MEDRONATE 20 MG/10ML
25 INJECTION, POWDER, LYOPHILIZED, FOR SOLUTION INTRAVENOUS ONCE
Status: COMPLETED | OUTPATIENT
Start: 2019-03-06 | End: 2019-03-06

## 2019-03-06 RX ADMIN — SODIUM CHLORIDE 79 ML: 9 INJECTION, SOLUTION INTRAVENOUS at 11:28

## 2019-03-06 RX ADMIN — IOPAMIDOL 135 ML: 755 INJECTION, SOLUTION INTRAVENOUS at 11:28

## 2019-03-06 RX ADMIN — TC 99M MEDRONATE 27.5 MCI.: 20 INJECTION, POWDER, LYOPHILIZED, FOR SOLUTION INTRAVENOUS at 11:00

## 2019-03-07 ENCOUNTER — TELEPHONE (OUTPATIENT)
Dept: ONCOLOGY | Facility: CLINIC | Age: 69
End: 2019-03-07

## 2019-03-07 ENCOUNTER — TELEPHONE (OUTPATIENT)
Dept: SURGERY | Facility: CLINIC | Age: 69
End: 2019-03-07

## 2019-03-07 DIAGNOSIS — C61 PROSTATE CANCER (H): Primary | ICD-10-CM

## 2019-03-07 NOTE — TELEPHONE ENCOUNTER
Spoke on phone re:CT results  Retroperitoneal lymphadenopathy seen  With his precipitous PSA rise, this could represent prostate cancer, although this would not be the usual area where we would see prostate cancer spread    I suspect he will need to undergo hormonal therapy to see if it improves the lymphadenopathy, but first I would like to get another opinion from Dr Hernández (oncology) to make certain he does not think patient requires a biopsy of lymph nodes or possibly an Axumin PET scan    Patient will seek opinion with Dr Hernández and then I will see him again shortly therafter

## 2019-03-07 NOTE — TELEPHONE ENCOUNTER
ONCOLOGY INTAKE: Records Information      APPT INFORMATION:03/29 w/Dr. Hernández at  at 1245  Referring provider:  Ever Rodgers MD  Referring provider s clinic:   Surg [facility]ties  Reason for visit/diagnosis:  Prostate cancer (H) [C61]    Were the records received with the referral (via [employer])? Complete per caller    Has patient been seen for any external appt for this diagnosis (enter clinic/location)? No    ADDITIONAL INFORMATION:  Prostate cancer (H) [C61]: [facility]: Ref by:Ever Rodgers MD- [facility]: Records In [facility]

## 2019-03-13 ENCOUNTER — ONCOLOGY VISIT (OUTPATIENT)
Dept: ONCOLOGY | Facility: CLINIC | Age: 69
End: 2019-03-13
Attending: INTERNAL MEDICINE
Payer: COMMERCIAL

## 2019-03-13 ENCOUNTER — HOSPITAL ENCOUNTER (OUTPATIENT)
Dept: ULTRASOUND IMAGING | Facility: CLINIC | Age: 69
Discharge: HOME | End: 2019-03-13
Attending: INTERNAL MEDICINE | Admitting: INTERNAL MEDICINE
Payer: COMMERCIAL

## 2019-03-13 VITALS
HEART RATE: 64 BPM | SYSTOLIC BLOOD PRESSURE: 137 MMHG | HEIGHT: 74 IN | WEIGHT: 300.8 LBS | TEMPERATURE: 98.2 F | OXYGEN SATURATION: 95 % | DIASTOLIC BLOOD PRESSURE: 83 MMHG | BODY MASS INDEX: 38.6 KG/M2

## 2019-03-13 DIAGNOSIS — C61 MALIGNANT NEOPLASM OF PROSTATE (H): Primary | ICD-10-CM

## 2019-03-13 DIAGNOSIS — C61 MALIGNANT NEOPLASM OF PROSTATE (H): ICD-10-CM

## 2019-03-13 LAB
ALBUMIN SERPL-MCNC: 3.7 G/DL (ref 3.4–5)
ALP SERPL-CCNC: 88 U/L (ref 40–150)
ALT SERPL W P-5'-P-CCNC: 35 U/L (ref 0–70)
ANION GAP SERPL CALCULATED.3IONS-SCNC: 6 MMOL/L (ref 3–14)
AST SERPL W P-5'-P-CCNC: 22 U/L (ref 0–45)
BASOPHILS # BLD AUTO: 0.1 10E9/L (ref 0–0.2)
BASOPHILS NFR BLD AUTO: 0.9 %
BILIRUB SERPL-MCNC: 0.4 MG/DL (ref 0.2–1.3)
BUN SERPL-MCNC: 15 MG/DL (ref 7–30)
CALCIUM SERPL-MCNC: 9 MG/DL (ref 8.5–10.1)
CHLORIDE SERPL-SCNC: 104 MMOL/L (ref 94–109)
CO2 SERPL-SCNC: 27 MMOL/L (ref 20–32)
CREAT SERPL-MCNC: 0.79 MG/DL (ref 0.66–1.25)
DIFFERENTIAL METHOD BLD: NORMAL
EOSINOPHIL # BLD AUTO: 0.1 10E9/L (ref 0–0.7)
EOSINOPHIL NFR BLD AUTO: 2 %
ERYTHROCYTE [DISTWIDTH] IN BLOOD BY AUTOMATED COUNT: 13.2 % (ref 10–15)
GFR SERPL CREATININE-BSD FRML MDRD: >90 ML/MIN/{1.73_M2}
GLUCOSE SERPL-MCNC: 87 MG/DL (ref 70–99)
HCT VFR BLD AUTO: 43.4 % (ref 40–53)
HGB BLD-MCNC: 14.1 G/DL (ref 13.3–17.7)
IMM GRANULOCYTES # BLD: 0 10E9/L (ref 0–0.4)
IMM GRANULOCYTES NFR BLD: 0.4 %
LYMPHOCYTES # BLD AUTO: 1.7 10E9/L (ref 0.8–5.3)
LYMPHOCYTES NFR BLD AUTO: 23.4 %
MCH RBC QN AUTO: 29.4 PG (ref 26.5–33)
MCHC RBC AUTO-ENTMCNC: 32.5 G/DL (ref 31.5–36.5)
MCV RBC AUTO: 91 FL (ref 78–100)
MONOCYTES # BLD AUTO: 0.4 10E9/L (ref 0–1.3)
MONOCYTES NFR BLD AUTO: 5.7 %
NEUTROPHILS # BLD AUTO: 4.8 10E9/L (ref 1.6–8.3)
NEUTROPHILS NFR BLD AUTO: 67.6 %
NRBC # BLD AUTO: 0 10*3/UL
NRBC BLD AUTO-RTO: 0 /100
PLATELET # BLD AUTO: 221 10E9/L (ref 150–450)
POTASSIUM SERPL-SCNC: 4.2 MMOL/L (ref 3.4–5.3)
PROT SERPL-MCNC: 7.7 G/DL (ref 6.8–8.8)
PSA SERPL-MCNC: 23.3 UG/L (ref 0–4)
RBC # BLD AUTO: 4.79 10E12/L (ref 4.4–5.9)
SODIUM SERPL-SCNC: 137 MMOL/L (ref 133–144)
WBC # BLD AUTO: 7.1 10E9/L (ref 4–11)

## 2019-03-13 PROCEDURE — 36415 COLL VENOUS BLD VENIPUNCTURE: CPT

## 2019-03-13 PROCEDURE — G0463 HOSPITAL OUTPT CLINIC VISIT: HCPCS | Mod: ZF

## 2019-03-13 PROCEDURE — 84153 ASSAY OF PSA TOTAL: CPT | Performed by: INTERNAL MEDICINE

## 2019-03-13 PROCEDURE — 84403 ASSAY OF TOTAL TESTOSTERONE: CPT | Performed by: INTERNAL MEDICINE

## 2019-03-13 PROCEDURE — 85025 COMPLETE CBC W/AUTO DIFF WBC: CPT | Performed by: INTERNAL MEDICINE

## 2019-03-13 PROCEDURE — 99205 OFFICE O/P NEW HI 60 MIN: CPT | Mod: ZP | Performed by: INTERNAL MEDICINE

## 2019-03-13 PROCEDURE — 93971 EXTREMITY STUDY: CPT | Mod: RT

## 2019-03-13 PROCEDURE — 86316 IMMUNOASSAY TUMOR OTHER: CPT | Performed by: INTERNAL MEDICINE

## 2019-03-13 PROCEDURE — 80053 COMPREHEN METABOLIC PANEL: CPT | Performed by: INTERNAL MEDICINE

## 2019-03-13 ASSESSMENT — PAIN SCALES - GENERAL: PAINLEVEL: NO PAIN (0)

## 2019-03-13 ASSESSMENT — MIFFLIN-ST. JEOR: SCORE: 2214.42
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
## 2019-03-13 NOTE — NURSING NOTE
"Oncology Rooming Note    March 13, 2019 10:47 AM   Keenan Sprague is a 68 year old male who presents for:    Chief Complaint   Patient presents with     Oncology Clinic Visit     new pt complete prostate cancer      Initial Vitals: There were no vitals taken for this visit. Estimated body mass index is 36.46 kg/m  as calculated from the following:    Height as of 3/5/19: 1.88 m (6' 2\").    Weight as of 3/5/19: 128.8 kg (284 lb). There is no height or weight on file to calculate BSA.  Data Unavailable Comment: Data Unavailable   No LMP for male patient.  Allergies reviewed: Yes  Medications reviewed: Yes    Medications: Medication refills not needed today.  Pharmacy name entered into [employer]:    [facility] - [place], [place] - [address]  [facility]  [facility] - [place], [place] - [address]  [id number] - [place], [place] - [address]  [facility] - [place] - [address] [facility]    Clinical concerns: none        Shalini Jerry, CMA              "
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
## 2019-03-15 ENCOUNTER — PATIENT OUTREACH (OUTPATIENT)
Dept: ONCOLOGY | Facility: CLINIC | Age: 69
End: 2019-03-15

## 2019-03-15 LAB — CGA SERPL-MCNC: 114 NG/ML (ref 0–95)

## 2019-03-18 LAB — TESTOST SERPL-MCNC: 389 NG/DL (ref 240–950)

## 2019-03-19 ENCOUNTER — TRANSFERRED RECORDS (OUTPATIENT)
Dept: HEALTH INFORMATION MANAGEMENT | Facility: CLINIC | Age: 69
End: 2019-03-19

## 2019-03-19 ENCOUNTER — OFFICE VISIT (OUTPATIENT)
Dept: INTERNAL MEDICINE | Facility: CLINIC | Age: 69
End: 2019-03-19
Payer: COMMERCIAL

## 2019-03-19 ENCOUNTER — OFFICE VISIT (OUTPATIENT)
Dept: UROLOGY | Facility: CLINIC | Age: 69
End: 2019-03-19
Payer: COMMERCIAL

## 2019-03-19 VITALS
TEMPERATURE: 98.3 F | WEIGHT: 295 LBS | BODY MASS INDEX: 37.86 KG/M2 | SYSTOLIC BLOOD PRESSURE: 126 MMHG | HEART RATE: 68 BPM | RESPIRATION RATE: 12 BRPM | OXYGEN SATURATION: 96 % | HEIGHT: 74 IN | DIASTOLIC BLOOD PRESSURE: 66 MMHG

## 2019-03-19 VITALS — HEART RATE: 76 BPM | HEIGHT: 74 IN | WEIGHT: 295 LBS | BODY MASS INDEX: 37.86 KG/M2 | OXYGEN SATURATION: 97 %

## 2019-03-19 DIAGNOSIS — C77.2 PROSTATE CANCER METASTATIC TO INTRAABDOMINAL LYMPH NODE (H): ICD-10-CM

## 2019-03-19 DIAGNOSIS — C61 MALIGNANT NEOPLASM OF PROSTATE (H): ICD-10-CM

## 2019-03-19 DIAGNOSIS — G47.33 OSA (OBSTRUCTIVE SLEEP APNEA): ICD-10-CM

## 2019-03-19 DIAGNOSIS — C61 PROSTATE CANCER (H): Primary | ICD-10-CM

## 2019-03-19 DIAGNOSIS — F41.9 ANXIETY: ICD-10-CM

## 2019-03-19 DIAGNOSIS — C61 PROSTATE CANCER METASTATIC TO INTRAABDOMINAL LYMPH NODE (H): ICD-10-CM

## 2019-03-19 DIAGNOSIS — I26.99 BILATERAL PULMONARY EMBOLISM (H): Primary | ICD-10-CM

## 2019-03-19 PROCEDURE — 99213 OFFICE O/P EST LOW 20 MIN: CPT | Performed by: UROLOGY

## 2019-03-19 PROCEDURE — 99214 OFFICE O/P EST MOD 30 MIN: CPT | Performed by: INTERNAL MEDICINE

## 2019-03-19 ASSESSMENT — PAIN SCALES - GENERAL: PAINLEVEL: NO PAIN (0)

## 2019-03-19 ASSESSMENT — MIFFLIN-ST. JEOR
SCORE: 2187.86
SCORE: 2187.86
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
## 2019-03-19 NOTE — NURSING NOTE
"Vital signs:  Temp: 98.3  F (36.8  C) Temp src: Oral BP: 126/66 Pulse: 68   Resp: 12 SpO2: 96 %     Height: 188 cm (6' 2\") Weight: 133.8 kg (295 lb)  Estimated body mass index is 37.88 kg/m  as calculated from the following:    Height as of this encounter: 1.88 m (6' 2\").    Weight as of this encounter: 133.8 kg (295 lb).          "
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
## 2019-03-31 ENCOUNTER — TELEPHONE (OUTPATIENT)
Dept: INTERNAL MEDICINE | Facility: CLINIC | Age: 69
End: 2019-03-31

## 2019-03-31 ENCOUNTER — NURSE TRIAGE (OUTPATIENT)
Dept: NURSING | Facility: CLINIC | Age: 69
End: 2019-03-31

## 2019-03-31 DIAGNOSIS — I26.99 BILATERAL PULMONARY EMBOLISM (H): ICD-10-CM

## 2019-03-31 NOTE — TELEPHONE ENCOUNTER
Patient states he has no refills of Xarelto.  Asking if he is to continue taking, and if so, needs refill sent to [facility].  Routed to PCP Pool.

## 2019-03-31 NOTE — TELEPHONE ENCOUNTER
Clinic Action Needed:  Please contact patient at [phone]; okay to leave message at that number.  Reason for Call:  Patient states he has no refills of Xarelto.  Asking if he is to continue taking, and if so, needs refill sent to [facility].  Routed to:   Pool    Please close encounter when completed.

## 2019-04-01 NOTE — TELEPHONE ENCOUNTER
Medication e-scribed to [facility] pharmacy 3-17-19 #30 tabs.    Patient is asking if he should continue taking Xarelto 20mg daily.    If so, need refill sent to pharmacy.    Per last office visit dictation 3-19-19: Continue Xarelto  Follow up with urology and oncology.  Follow-Up: in 6 months.    Patient informed to continue Xarelto.     Please authorize refill d/t medication has not been prescribed by primary care provider.

## 2019-04-03 ENCOUNTER — ONCOLOGY VISIT (OUTPATIENT)
Dept: ONCOLOGY | Facility: CLINIC | Age: 69
End: 2019-04-03
Attending: INTERNAL MEDICINE
Payer: COMMERCIAL

## 2019-04-03 ENCOUNTER — ONCOLOGY VISIT (OUTPATIENT)
Dept: ONCOLOGY | Facility: CLINIC | Age: 69
End: 2019-04-03

## 2019-04-03 ENCOUNTER — APPOINTMENT (OUTPATIENT)
Dept: LAB | Facility: CLINIC | Age: 69
End: 2019-04-03
Attending: INTERNAL MEDICINE
Payer: COMMERCIAL

## 2019-04-03 VITALS
BODY MASS INDEX: 38.63 KG/M2 | SYSTOLIC BLOOD PRESSURE: 133 MMHG | TEMPERATURE: 98.7 F | RESPIRATION RATE: 16 BRPM | OXYGEN SATURATION: 98 % | HEIGHT: 74 IN | HEART RATE: 80 BPM | DIASTOLIC BLOOD PRESSURE: 78 MMHG | WEIGHT: 301 LBS

## 2019-04-03 DIAGNOSIS — C61 MALIGNANT NEOPLASM OF PROSTATE (H): Primary | ICD-10-CM

## 2019-04-03 DIAGNOSIS — Z79.899 ENCOUNTER FOR LONG-TERM (CURRENT) USE OF MEDICATIONS: ICD-10-CM

## 2019-04-03 LAB
ALBUMIN SERPL-MCNC: 4 G/DL (ref 3.4–5)
ALP SERPL-CCNC: 79 U/L (ref 40–150)
ALT SERPL W P-5'-P-CCNC: 44 U/L (ref 0–70)
ANION GAP SERPL CALCULATED.3IONS-SCNC: 6 MMOL/L (ref 3–14)
AST SERPL W P-5'-P-CCNC: 28 U/L (ref 0–45)
BASOPHILS # BLD AUTO: 0 10E9/L (ref 0–0.2)
BASOPHILS NFR BLD AUTO: 0.7 %
BILIRUB SERPL-MCNC: 0.4 MG/DL (ref 0.2–1.3)
BUN SERPL-MCNC: 19 MG/DL (ref 7–30)
CALCIUM SERPL-MCNC: 9.4 MG/DL (ref 8.5–10.1)
CHLORIDE SERPL-SCNC: 107 MMOL/L (ref 94–109)
CO2 SERPL-SCNC: 25 MMOL/L (ref 20–32)
CREAT SERPL-MCNC: 0.82 MG/DL (ref 0.66–1.25)
DIFFERENTIAL METHOD BLD: NORMAL
EOSINOPHIL # BLD AUTO: 0.1 10E9/L (ref 0–0.7)
EOSINOPHIL NFR BLD AUTO: 1.3 %
ERYTHROCYTE [DISTWIDTH] IN BLOOD BY AUTOMATED COUNT: 14 % (ref 10–15)
GFR SERPL CREATININE-BSD FRML MDRD: >90 ML/MIN/{1.73_M2}
GLUCOSE SERPL-MCNC: 90 MG/DL (ref 70–99)
HCT VFR BLD AUTO: 43.2 % (ref 40–53)
HGB BLD-MCNC: 14.2 G/DL (ref 13.3–17.7)
IMM GRANULOCYTES # BLD: 0 10E9/L (ref 0–0.4)
IMM GRANULOCYTES NFR BLD: 0.3 %
LYMPHOCYTES # BLD AUTO: 1.4 10E9/L (ref 0.8–5.3)
LYMPHOCYTES NFR BLD AUTO: 22.7 %
MCH RBC QN AUTO: 29.8 PG (ref 26.5–33)
MCHC RBC AUTO-ENTMCNC: 32.9 G/DL (ref 31.5–36.5)
MCV RBC AUTO: 91 FL (ref 78–100)
MONOCYTES # BLD AUTO: 0.5 10E9/L (ref 0–1.3)
MONOCYTES NFR BLD AUTO: 8 %
NEUTROPHILS # BLD AUTO: 4 10E9/L (ref 1.6–8.3)
NEUTROPHILS NFR BLD AUTO: 67 %
NRBC # BLD AUTO: 0 10*3/UL
NRBC BLD AUTO-RTO: 0 /100
PLATELET # BLD AUTO: 180 10E9/L (ref 150–450)
POTASSIUM SERPL-SCNC: 4.1 MMOL/L (ref 3.4–5.3)
PROT SERPL-MCNC: 7.7 G/DL (ref 6.8–8.8)
PSA SERPL-MCNC: 21.5 UG/L (ref 0–4)
RBC # BLD AUTO: 4.77 10E12/L (ref 4.4–5.9)
SODIUM SERPL-SCNC: 137 MMOL/L (ref 133–144)
WBC # BLD AUTO: 6 10E9/L (ref 4–11)

## 2019-04-03 PROCEDURE — 96402 CHEMO HORMON ANTINEOPL SQ/IM: CPT

## 2019-04-03 PROCEDURE — 86316 IMMUNOASSAY TUMOR OTHER: CPT | Performed by: INTERNAL MEDICINE

## 2019-04-03 PROCEDURE — 84153 ASSAY OF PSA TOTAL: CPT | Performed by: INTERNAL MEDICINE

## 2019-04-03 PROCEDURE — 85025 COMPLETE CBC W/AUTO DIFF WBC: CPT | Performed by: INTERNAL MEDICINE

## 2019-04-03 PROCEDURE — 99215 OFFICE O/P EST HI 40 MIN: CPT | Mod: ZP | Performed by: INTERNAL MEDICINE

## 2019-04-03 PROCEDURE — 80053 COMPREHEN METABOLIC PANEL: CPT | Performed by: INTERNAL MEDICINE

## 2019-04-03 PROCEDURE — 84403 ASSAY OF TOTAL TESTOSTERONE: CPT | Performed by: INTERNAL MEDICINE

## 2019-04-03 PROCEDURE — 25000128 H RX IP 250 OP 636: Mod: ZF | Performed by: INTERNAL MEDICINE

## 2019-04-03 PROCEDURE — G0463 HOSPITAL OUTPT CLINIC VISIT: HCPCS | Mod: ZF

## 2019-04-03 PROCEDURE — 36415 COLL VENOUS BLD VENIPUNCTURE: CPT

## 2019-04-03 RX ORDER — PROCHLORPERAZINE MALEATE 10 MG
5 TABLET ORAL EVERY 6 HOURS PRN
Qty: 30 TABLET | Refills: 2 | Status: SHIPPED | OUTPATIENT
Start: 2019-04-03 | End: 2021-03-29

## 2019-04-03 RX ADMIN — LEUPROLIDE ACETATE 22.5 MG: KIT at 15:44

## 2019-04-03 ASSESSMENT — MIFFLIN-ST. JEOR: SCORE: 2215.33

## 2019-04-03 ASSESSMENT — PAIN SCALES - GENERAL: PAINLEVEL: NO PAIN (0)

## 2019-04-03 NOTE — NURSING NOTE
Chief Complaint   Patient presents with     Blood Draw     NO LABS ORDERED; 1 green gel, 1 purple, 1 red gel, & 2 red JIC tubes drawn. Labs drawn in lab, vitals taken, pt checked in for appt     Destiny Peterson, CMA
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
## 2019-04-03 NOTE — ORAL ONC MGMT
"Oral Chemotherapy Monitoring Program    Primary Oncologist: Dr. Davis  Primary Oncology Clinic: [facility]  Cancer Diagnosis: Prostate Cancer    Drug: Zytiga  Start Date: as soon as available  Dose is appropriate for patients: Hepatic Function   Expected duration of therapy: Until disease progression or unacceptable toxicity    Drug Interaction Assessment: No drug interactions found with Zytiga.    Abiraterone Acetate -PredniSONE -Prochlorperazine -Rivaroxaban -Leuprolide    Lab Monitoring Plan  CMP Q2 weeks for the first 2 months, then monthly  Subjective/Objective:  Keenan Sprague is a 68 year old male seen in clinic for an initial visit for oral chemotherapy education.     ORAL CHEMOTHERAPY 4/3/2019   Drug Name Zytiga (Abiraterone)   Current Dosage 1000mg   Current Schedule Daily   Cycle Details Continuous   Any new drug interactions? No   Is the dose as ordered appropriate for the patient? Yes       Last PHQ-2 Score on record:   PHQ-2 ( [facility]) 3/5/2019 12/19/2018   Q1: Little interest or pleasure in doing things 0 0   Q2: Feeling down, depressed or hopeless 0 0   PHQ-2 Score 0 0       Vitals:  BP:   BP Readings from Last 1 Encounters:   04/03/19 133/78     Wt Readings from Last 1 Encounters:   04/03/19 136.5 kg (301 lb)     Estimated body surface area is 2.67 meters squared as calculated from the following:    Height as of an earlier encounter on 4/3/19: 1.88 m (6' 2.02\").    Weight as of an earlier encounter on 4/3/19: 136.5 kg (301 lb).      Labs:  _  Result Component Current Result Ref Range   Sodium 137 (4/3/2019) 133 - 144 mmol/L     _  Result Component Current Result Ref Range   Potassium 4.1 (4/3/2019) 3.4 - 5.3 mmol/L     _  Result Component Current Result Ref Range   Calcium 9.4 (4/3/2019) 8.5 - 10.1 mg/dL     No results found for Mag within last 30 days.     No results found for Phos within last 30 days.     _  Result Component Current Result Ref Range   Albumin 4.0 (4/3/2019) 3.4 - 5.0 " g/dL     _  Result Component Current Result Ref Range   Urea Nitrogen 19 (4/3/2019) 7 - 30 mg/dL     _  Result Component Current Result Ref Range   Creatinine 0.82 (4/3/2019) 0.66 - 1.25 mg/dL       _  Result Component Current Result Ref Range   AST 28 (4/3/2019) 0 - 45 U/L     _  Result Component Current Result Ref Range   ALT 44 (4/3/2019) 0 - 70 U/L     _  Result Component Current Result Ref Range   Bilirubin Total 0.4 (4/3/2019) 0.2 - 1.3 mg/dL       _  Result Component Current Result Ref Range   WBC 6.0 (4/3/2019) 4.0 - 11.0 10e9/L     _  Result Component Current Result Ref Range   Hemoglobin 14.2 (4/3/2019) 13.3 - 17.7 g/dL     _  Result Component Current Result Ref Range   Platelet Count 180 (4/3/2019) 150 - 450 10e9/L     _  Result Component Current Result Ref Range   Absolute Neutrophil 4.0 (4/3/2019) 1.6 - 8.3 10e9/L     Assessment:  Patient is appropriate to start therapy.    Plan:  Basic chemotherapy teaching was reviewed with the patient including indication, start date of therapy, dose, administration, adverse effects, missed doses, food and drug interactions, monitoring, side effect management, office contact information, and safe handling. Written materials were provided and all questions answered to Nathan's stated satisfaction.    Follow-Up:  About one week after start of Sonali Lemus PharmD  [facility]  [phone]  April 3, 2019
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
## 2019-04-03 NOTE — NURSING NOTE
Lupron injection 22.5 mg given in Left ventrogluteal without complication.  Patient tolerated well and was discharged. See VERITO Cornejo CMA ([facility])

## 2019-04-03 NOTE — NURSING NOTE
"Oncology Rooming Note    April 3, 2019 2:21 PM   Keenan Sprague is a 68 year old male who presents for:    Chief Complaint   Patient presents with     Blood Draw     NO LABS ORDERED; 1 green gel, 1 purple, 1 red gel, & 2 red JIC tubes drawn. Labs drawn in lab, vitals taken, pt checked in for appt     Oncology Clinic Visit     Return visit related to Prostate Cancer     Initial Vitals: /78 (BP Location: Right arm, Patient Position: Sitting, Cuff Size: Adult Large)   Pulse 80   Temp 98.7  F (37.1  C) (Oral)   Resp 16   Ht 1.88 m (6' 2.02\")   Wt 136.5 kg (301 lb)   SpO2 98%   BMI 38.63 kg/m   Estimated body mass index is 38.63 kg/m  as calculated from the following:    Height as of this encounter: 1.88 m (6' 2.02\").    Weight as of this encounter: 136.5 kg (301 lb). Body surface area is 2.67 meters squared.  No Pain (0) Comment: Data Unavailable   No LMP for male patient.  Allergies reviewed: Yes  Medications reviewed: Yes    Medications: Medication refills not needed today.  Pharmacy name entered into [employer]:    [facility] - [place], [place] - [address]  [facility]  [facility] - [place], [place] - [address]  [facility] - [place], [place] - [address]  [facility] - [place], [place] - [address] [address] [facility]    Clinical concerns: No new concerns. Provider was notified.      Kim Guardado LPN            "
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
## 2019-04-03 NOTE — LETTER
4/3/2019       RE: Keenan Sprague  [address]  [place] [address]     Dear Colleague,    Thank you for referring your patient, Keenan Sprague, to the [facility]. Please see a copy of my visit note below.    See Oral Onc Mgmt note.    Again, thank you for allowing me to participate in the care of your patient.      Sincerely,    Mitchel Lemus RPH

## 2019-04-04 ENCOUNTER — TELEPHONE (OUTPATIENT)
Dept: ONCOLOGY | Facility: CLINIC | Age: 69
End: 2019-04-04

## 2019-04-04 ENCOUNTER — TRANSFERRED RECORDS (OUTPATIENT)
Dept: HEALTH INFORMATION MANAGEMENT | Facility: CLINIC | Age: 69
End: 2019-04-04

## 2019-04-05 DIAGNOSIS — C61 MALIGNANT NEOPLASM OF PROSTATE (H): Primary | ICD-10-CM

## 2019-04-05 LAB — TESTOST SERPL-MCNC: 285 NG/DL (ref 240–950)

## 2019-04-05 RX ORDER — PREDNISONE 5 MG/1
5 TABLET ORAL DAILY
Qty: 30 TABLET | Refills: 0 | Status: SHIPPED | OUTPATIENT
Start: 2019-04-05 | End: 2019-04-24

## 2019-04-05 RX ORDER — ABIRATERONE ACETATE 250 MG/1
1000 TABLET ORAL DAILY
Qty: 120 TABLET | Refills: 0 | Status: SHIPPED | OUTPATIENT
Start: 2019-04-05 | End: 2019-07-17

## 2019-04-05 NOTE — TELEPHONE ENCOUNTER
Prior Authorization Approval    Authorization Effective Date: 4/4/2019  Authorization Expiration Date: 4/3/2020  Medication: Zytiga prior authorization approved, first 6 fills must be 15 day supply, insur requires [employer].  Approved Dose/Quantity: 1000mg, 120/30, first 6 fills only 15 day supply  Reference #: [id number]   Insurance Company: [employer] - Phone [phone] Fax [phone]  Expected CoPay: $0 if generic is dispensed    CoPay Card Available: No    Foundation Assistance Needed: N/A  Which Pharmacy is filling the prescription (Not needed for infusion/clinic administered): [facility] - [place], [place] - [address]  Pharmacy Notified: Yes  Patient Notified: Yes

## 2019-04-06 LAB — CGA SERPL-MCNC: 121 NG/ML (ref 0–95)

## 2019-04-12 NOTE — NURSING NOTE
Met with patient after provider visit. Presented consent form for Biorepositiry for  ([id number]). Pt stated he wanted a few days to read through consent. I spoke with patient on the phone on Friday 4/5/19. Pt stated that he did not wish to sign consent at this time. Manju Kwon, [facility]

## 2019-04-15 ENCOUNTER — TELEPHONE (OUTPATIENT)
Dept: ONCOLOGY | Facility: CLINIC | Age: 69
End: 2019-04-15

## 2019-04-15 NOTE — ORAL ONC MGMT
"Oral Chemotherapy Monitoring Program    Primary Oncologist: Ryan  Primary Oncology Clinic: [facility]  Cancer Diagnosis: Prostate    Therapy History:  Zytiga 1000mg daily    Drug Interaction Assessment: No new medications      Subjective/Objective:  Keenan Sprague is a 68 year old male contacted by phone for a follow-up visit for oral chemotherapy.  He started on 4/9, and has no side effects he can attribute to the zytiga. He does endorse peripheral edema in the legs, but states that it has been like that since 2007ish. He also reports hot flashes with the restart of leuprolide injections, but that they are tolerable.    ORAL CHEMOTHERAPY 4/3/2019 4/15/2019   Drug Name Zytiga (Abiraterone) Zytiga (Abiraterone)   Current Dosage 1000mg 1000mg   Current Schedule Daily Daily   Cycle Details Continuous Continuous   Start Date of Last Cycle - 4/9/2019   Adherence Assessment - Adherent   Adverse Effects - No AE identified during assessment   Any new drug interactions? No No   Is the dose as ordered appropriate for the patient? Yes Yes       Last PHQ-2 Score on record:   PHQ-2 ( [facility]) 3/5/2019 12/19/2018   Q1: Little interest or pleasure in doing things 0 0   Q2: Feeling down, depressed or hopeless 0 0   PHQ-2 Score 0 0       Patient does not report depression symptoms.      Vitals:  BP:   BP Readings from Last 1 Encounters:   04/03/19 133/78     Wt Readings from Last 1 Encounters:   04/03/19 136.5 kg (301 lb)     Estimated body surface area is 2.67 meters squared as calculated from the following:    Height as of 4/3/19: 1.88 m (6' 2.02\").    Weight as of 4/3/19: 136.5 kg (301 lb).    Labs:  _  Result Component Current Result Ref Range   Sodium 137 (4/3/2019) 133 - 144 mmol/L     _  Result Component Current Result Ref Range   Potassium 4.1 (4/3/2019) 3.4 - 5.3 mmol/L     _  Result Component Current Result Ref Range   Calcium 9.4 (4/3/2019) 8.5 - 10.1 mg/dL     No results found for Mag within last 30 days.     No results " found for Phos within last 30 days.     _  Result Component Current Result Ref Range   Albumin 4.0 (4/3/2019) 3.4 - 5.0 g/dL     _  Result Component Current Result Ref Range   Urea Nitrogen 19 (4/3/2019) 7 - 30 mg/dL     _  Result Component Current Result Ref Range   Creatinine 0.82 (4/3/2019) 0.66 - 1.25 mg/dL       _  Result Component Current Result Ref Range   AST 28 (4/3/2019) 0 - 45 U/L     _  Result Component Current Result Ref Range   ALT 44 (4/3/2019) 0 - 70 U/L     _  Result Component Current Result Ref Range   Bilirubin Total 0.4 (4/3/2019) 0.2 - 1.3 mg/dL       _  Result Component Current Result Ref Range   WBC 6.0 (4/3/2019) 4.0 - 11.0 10e9/L     _  Result Component Current Result Ref Range   Hemoglobin 14.2 (4/3/2019) 13.3 - 17.7 g/dL     _  Result Component Current Result Ref Range   Platelet Count 180 (4/3/2019) 150 - 450 10e9/L     _  Result Component Current Result Ref Range   Absolute Neutrophil 4.0 (4/3/2019) 1.6 - 8.3 10e9/L               Assessment:  Patient is tolerating Abiraterone therapy well.    Plan:  Continue Abiraterone.    Follow-Up:  In one month over the phone.    Refill Due:   5/9/19    Yuan Lenz, Pharm D.  Clinical Oncology Pharmacist- Oral Chemotherapy Monitoring Program  [phone]    April 15, 2019

## 2019-04-18 ENCOUNTER — TELEPHONE (OUTPATIENT)
Dept: INTERNAL MEDICINE | Facility: CLINIC | Age: 69
End: 2019-04-18

## 2019-04-18 LAB — INTERPRETATION ECG - MUSE: NORMAL

## 2019-04-18 NOTE — TELEPHONE ENCOUNTER
Reason for Call:  Medication or medication refill:    Do you use a [facility]?  Name of the pharmacy and phone number for the current request:  [facility] [place], [place] - [address] [address] [facility]    Name of the medication requested: Xarelto    Other request: Pt says he cannot find his medication and did not take it today.      Can we leave a detailed message on this number? YES    Phone number patient can be reached at: Home number on file [phone] (home)    Best Time: any    Call taken on 4/18/2019 at 9:42 AM by Kandice Michel

## 2019-04-18 NOTE — TELEPHONE ENCOUNTER
Medication refilled 4-1-19 #30 with 5 additional refills.    Patient states when he went to take his medication this morning, wasn't on the shelf it normally is on.  And didn't have time to look for it d/t would be late for work.  Worried that he won't be able to find it when he gets home and would miss the dose for today.    Advised patient to contact pharmacy to get an early refill--explain to pharmacy what happened this am.

## 2019-04-22 DIAGNOSIS — C61 MALIGNANT NEOPLASM OF PROSTATE (H): ICD-10-CM

## 2019-04-24 RX ORDER — PREDNISONE 5 MG/1
5 TABLET ORAL DAILY
Qty: 30 TABLET | Refills: 0 | Status: SHIPPED | OUTPATIENT
Start: 2019-04-24 | End: 2019-07-17

## 2019-04-26 ENCOUNTER — TELEPHONE (OUTPATIENT)
Dept: ONCOLOGY | Facility: CLINIC | Age: 69
End: 2019-04-26

## 2019-04-26 NOTE — TELEPHONE ENCOUNTER
Tobacco Treatment Team at the [facility] spoke with Mr. Sprague on 4/26/2019 regarding the tobacco cessation program and he undecided to participate with the program.We will call patient at another time to discuss tobacco cessation program.    Patient is not sure he is ready to quit smoking. Requested we call him back in a month.     EDY Morrissey  Tobacco Treatment Specialist  PH: [phone]

## 2019-04-29 DIAGNOSIS — C61 MALIGNANT NEOPLASM OF PROSTATE (H): ICD-10-CM

## 2019-05-01 ENCOUNTER — ONCOLOGY VISIT (OUTPATIENT)
Dept: ONCOLOGY | Facility: CLINIC | Age: 69
End: 2019-05-01
Attending: INTERNAL MEDICINE
Payer: COMMERCIAL

## 2019-05-01 VITALS
HEART RATE: 54 BPM | DIASTOLIC BLOOD PRESSURE: 82 MMHG | TEMPERATURE: 98.3 F | SYSTOLIC BLOOD PRESSURE: 160 MMHG | RESPIRATION RATE: 16 BRPM | OXYGEN SATURATION: 97 % | BODY MASS INDEX: 38.64 KG/M2 | WEIGHT: 301.1 LBS | HEIGHT: 74 IN

## 2019-05-01 DIAGNOSIS — Z79.899 ENCOUNTER FOR LONG-TERM (CURRENT) USE OF MEDICATIONS: Primary | ICD-10-CM

## 2019-05-01 DIAGNOSIS — C61 MALIGNANT NEOPLASM OF PROSTATE (H): ICD-10-CM

## 2019-05-01 LAB
BASOPHILS # BLD AUTO: 0.1 10E9/L (ref 0–0.2)
BASOPHILS NFR BLD AUTO: 0.9 %
CHOLEST SERPL-MCNC: 188 MG/DL
DIFFERENTIAL METHOD BLD: NORMAL
EOSINOPHIL # BLD AUTO: 0.2 10E9/L (ref 0–0.7)
EOSINOPHIL NFR BLD AUTO: 3.2 %
ERYTHROCYTE [DISTWIDTH] IN BLOOD BY AUTOMATED COUNT: 14.4 % (ref 10–15)
HCT VFR BLD AUTO: 43.9 % (ref 40–53)
HDLC SERPL-MCNC: 54 MG/DL
HGB BLD-MCNC: 14.3 G/DL (ref 13.3–17.7)
IMM GRANULOCYTES # BLD: 0 10E9/L (ref 0–0.4)
IMM GRANULOCYTES NFR BLD: 0.5 %
LDLC SERPL CALC-MCNC: 120 MG/DL
LYMPHOCYTES # BLD AUTO: 1.6 10E9/L (ref 0.8–5.3)
LYMPHOCYTES NFR BLD AUTO: 27.2 %
MCH RBC QN AUTO: 30.8 PG (ref 26.5–33)
MCHC RBC AUTO-ENTMCNC: 32.6 G/DL (ref 31.5–36.5)
MCV RBC AUTO: 95 FL (ref 78–100)
MONOCYTES # BLD AUTO: 0.6 10E9/L (ref 0–1.3)
MONOCYTES NFR BLD AUTO: 9.4 %
NEUTROPHILS # BLD AUTO: 3.5 10E9/L (ref 1.6–8.3)
NEUTROPHILS NFR BLD AUTO: 58.8 %
NONHDLC SERPL-MCNC: 133 MG/DL
NRBC # BLD AUTO: 0 10*3/UL
NRBC BLD AUTO-RTO: 0 /100
PLATELET # BLD AUTO: 163 10E9/L (ref 150–450)
PSA SERPL-MCNC: 7.09 UG/L (ref 0–4)
RBC # BLD AUTO: 4.64 10E12/L (ref 4.4–5.9)
TRIGL SERPL-MCNC: 65 MG/DL
WBC # BLD AUTO: 5.9 10E9/L (ref 4–11)

## 2019-05-01 PROCEDURE — 84403 ASSAY OF TOTAL TESTOSTERONE: CPT | Performed by: INTERNAL MEDICINE

## 2019-05-01 PROCEDURE — 86316 IMMUNOASSAY TUMOR OTHER: CPT | Performed by: INTERNAL MEDICINE

## 2019-05-01 PROCEDURE — G0463 HOSPITAL OUTPT CLINIC VISIT: HCPCS | Mod: ZF

## 2019-05-01 PROCEDURE — 84153 ASSAY OF PSA TOTAL: CPT | Performed by: INTERNAL MEDICINE

## 2019-05-01 PROCEDURE — 85025 COMPLETE CBC W/AUTO DIFF WBC: CPT | Performed by: INTERNAL MEDICINE

## 2019-05-01 PROCEDURE — 99215 OFFICE O/P EST HI 40 MIN: CPT | Mod: ZP | Performed by: INTERNAL MEDICINE

## 2019-05-01 PROCEDURE — 80061 LIPID PANEL: CPT | Performed by: INTERNAL MEDICINE

## 2019-05-01 PROCEDURE — 36415 COLL VENOUS BLD VENIPUNCTURE: CPT

## 2019-05-01 ASSESSMENT — PAIN SCALES - GENERAL: PAINLEVEL: NO PAIN (0)

## 2019-05-01 ASSESSMENT — MIFFLIN-ST. JEOR: SCORE: 2215.85
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
## 2019-05-01 NOTE — NURSING NOTE
Patient labs drawn in clinic via venipuncture. Patient tolerated well. See flow sheet.    Eileen Mcginnis CMA ([facility])
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
## 2019-05-01 NOTE — NURSING NOTE
"Oncology Rooming Note    May 1, 2019 7:21 AM   Keenan Sprague is a 68 year old male who presents for:    Chief Complaint   Patient presents with     Oncology Clinic Visit     RETURN VISIT; PROSTATE CA; VITALS COMPLETED BY CMA      Initial Vitals: /82   Pulse 54   Temp 98.3  F (36.8  C) (Oral)   Resp 16   Ht 1.88 m (6' 2.02\")   Wt 136.6 kg (301 lb 1.6 oz)   SpO2 97%   BMI 38.64 kg/m   Estimated body mass index is 38.64 kg/m  as calculated from the following:    Height as of this encounter: 1.88 m (6' 2.02\").    Weight as of this encounter: 136.6 kg (301 lb 1.6 oz). Body surface area is 2.67 meters squared.  No Pain (0) Comment: Data Unavailable   No LMP for male patient.  Allergies reviewed: Yes  Medications reviewed: Yes    Medications: MEDICATION REFILLS NEEDED TODAY. Provider was notified. Patient needs a refill on prednisone 5 mg tablet and Abiraterone 250 mg tablet.     Pharmacy name entered into [facility]:    [facility] - [place], [place] - [address]  [facility]  [facility] - [place], [place] - [address]  [id number] - [place], [place] - [address] [facility] AT [address]  [facility] - [place], [place] - [address] [address]    Clinical concerns: No new concerns today  Dr. Davis was notified.      Yani Guo              "

## 2019-05-02 DIAGNOSIS — C61 MALIGNANT NEOPLASM OF PROSTATE (H): Primary | ICD-10-CM

## 2019-05-02 RX ORDER — ABIRATERONE ACETATE 250 MG/1
1000 TABLET ORAL DAILY
Qty: 120 TABLET | Refills: 0 | Status: CANCELLED | OUTPATIENT
Start: 2019-05-03

## 2019-05-02 RX ORDER — PREDNISONE 5 MG/1
5 TABLET ORAL DAILY
Qty: 30 TABLET | Refills: 0 | Status: CANCELLED | OUTPATIENT
Start: 2019-05-03

## 2019-05-02 RX ORDER — PREDNISONE 5 MG/1
5 TABLET ORAL DAILY
Qty: 30 TABLET | Refills: 0 | Status: SHIPPED | OUTPATIENT
Start: 2019-05-02 | End: 2019-07-17

## 2019-05-02 RX ORDER — ABIRATERONE ACETATE 250 MG/1
1000 TABLET ORAL DAILY
Qty: 120 TABLET | Refills: 0 | Status: SHIPPED | OUTPATIENT
Start: 2019-05-02 | End: 2019-07-17

## 2019-05-03 LAB
CGA SERPL-MCNC: 109 NG/ML (ref 0–95)
TESTOST SERPL-MCNC: <2 NG/DL (ref 240–950)

## 2019-05-14 ENCOUNTER — OFFICE VISIT (OUTPATIENT)
Dept: URGENT CARE | Facility: URGENT CARE | Age: 69
End: 2019-05-14
Payer: COMMERCIAL

## 2019-05-14 VITALS
HEART RATE: 81 BPM | SYSTOLIC BLOOD PRESSURE: 140 MMHG | RESPIRATION RATE: 16 BRPM | WEIGHT: 301 LBS | BODY MASS INDEX: 38.63 KG/M2 | OXYGEN SATURATION: 97 % | TEMPERATURE: 98.7 F | DIASTOLIC BLOOD PRESSURE: 78 MMHG

## 2019-05-14 DIAGNOSIS — H61.23 BILATERAL IMPACTED CERUMEN: Primary | ICD-10-CM

## 2019-05-14 PROCEDURE — 99213 OFFICE O/P EST LOW 20 MIN: CPT | Mod: 25 | Performed by: FAMILY MEDICINE

## 2019-05-14 PROCEDURE — 69209 REMOVE IMPACTED EAR WAX UNI: CPT | Mod: 50 | Performed by: FAMILY MEDICINE

## 2019-05-14 NOTE — PROGRESS NOTES
Subjective:   Keenan Sprague is a 68 year old male who presents for   Chief Complaint   Patient presents with     Urgent Care     Otalgia     right ear pain since flight to [place] two weeks ago       Started with Right ear not improving during the airplane ride to [place]. Was able to clear out the pressure but then having started to have problems. He tried using hydrogen peroxide and cotton tips. Then used a water and syringe to try to clear out his right ear. Diminished hearing yesterday.     SH: works for [employer]    Patient Active Problem List    Diagnosis Date Noted     Obesity (BMI 35.0-39.9) with comorbidity (H) 02/27/2019     Priority: Medium     Bilateral pulmonary embolism (H) 02/23/2019     Priority: Medium     Anxiety 01/29/2016     Priority: Medium     Advanced directives, counseling/discussion 03/18/2013     Priority: Medium     JACKI (obstructive sleep apnea) 04/19/2011     Priority: Medium     CARDIOVASCULAR SCREENING; LDL GOAL LESS THAN 130 10/31/2010     Priority: Medium     Malignant neoplasm of prostate (H) 01/04/2008     Priority: Medium     Embolism and thrombosis (H) 07/15/2003     Priority: Medium     Problem list name updated by automated process. Provider to review         Current Outpatient Medications   Medication     abiraterone (ZYTIGA) 250 MG tablet     predniSONE (DELTASONE) 5 MG tablet     predniSONE (DELTASONE) 5 MG tablet     prochlorperazine (COMPAZINE) 10 MG tablet     rivaroxaban ANTICOAGULANT (XARELTO) 20 MG TABS tablet     No current facility-administered medications for this visit.        ROS:  As above per HPI    Objective:   /78   Pulse 81   Temp 98.7  F (37.1  C) (Oral)   Resp 16   Wt 136.5 kg (301 lb)   SpO2 97%   BMI 38.63 kg/m  , Body mass index is 38.63 kg/m .  Gen:  NAD, well-nourished, sitting in chair comfortably  HEENT: EOMI, sclera anicteric, Head normocephalic, ; nares patent; moist mucous membranes, thick buildup of cerumen at mid-canal and beyond in  both TM's  Neck: trachea midline, no thyromegaly  CV:  Hemodynamically stable  Pulm:  no increased work of breathing   Skin: no obvious rashes or abnormalities  Psych: Euthymic, linear thoughts, normal rate of speech      Assessment & Plan:   Keenan Sprague, 68 year old male who presents with:  Bilateral impacted cerumen  Able to clear our right ear canal but left canal still had an impressive amount after several attempts at flush. Encouraged he use debrox for next 3-5 days to help dissolve remaining amount.   - HC REMOVAL IMPACTED CERUMEN IRRIGATION/LVG UNILAT  - HC REMOVAL IMPACTED CERUMEN IRRIGATION/LVG UNILAT      George Reyna MD   [place] [facility]    The use of [employer] dictation services may have been used to construct the content in this note; any grammatical or spelling errors are non-intentional. Please contact the author of this note directly if you are in need of any clarification.

## 2019-05-15 ENCOUNTER — TELEPHONE (OUTPATIENT)
Dept: ONCOLOGY | Facility: CLINIC | Age: 69
End: 2019-05-15

## 2019-05-15 NOTE — ORAL ONC MGMT
"Oral Chemotherapy Monitoring Program    Primary Oncologist: Dr. Davis  Primary Oncology Clinic: [facility]  Cancer Diagnosis: Prostate Cancer    Therapy History:  Zytiga (abiraterone) 1000mg ([id number]) daily, continuously  Started 4/9/19    Drug Interaction Assessment: No new drug interactions identified.    Lab Monitoring Plan  CMP & CBC monthly  Subjective/Objective:  Keenan Sprague is a 68 year old male contacted by phone for a follow-up visit for oral chemotherapy. Nathan is tolerating Zytiga well and reported no bothersome side effects. He specifically denied nausea/vomiting, diarrhea, constipation, and swelling. He endorsed some neuropathy in his legs which he had prior to starting Zytiga, and notes that it seems to have improved since starting Zytiga. He noted a small red rash on the back of his hand that lasted about 3 days before going away.    ORAL CHEMOTHERAPY 4/3/2019 4/15/2019 5/15/2019   Drug Name Zytiga (Abiraterone) Zytiga (Abiraterone) Zytiga (Abiraterone)   Current Dosage 1000mg 1000mg 1000mg   Current Schedule Daily Daily Daily   Cycle Details Continuous Continuous Continuous   Start Date of Last Cycle - 4/9/2019 -   Doses missed in last 2 weeks - - 0   Adherence Assessment - Adherent Adherent   Adverse Effects - No AE identified during assessment No AE identified during assessment   Any new drug interactions? No No No   Is the dose as ordered appropriate for the patient? Yes Yes -       Vitals:  BP:   BP Readings from Last 1 Encounters:   05/14/19 140/78     Wt Readings from Last 1 Encounters:   05/14/19 136.5 kg (301 lb)     Estimated body surface area is 2.67 meters squared as calculated from the following:    Height as of 5/1/19: 1.88 m (6' 2.02\").    Weight as of 5/14/19: 136.5 kg (301 lb).    Labs:  No results found for NA within last 30 days.     No results found for K within last 30 days.     No results found for CA within last 30 days.     No results found for Mag within last 30 " days.     No results found for Phos within last 30 days.     No results found for ALBUMIN within last 30 days.     No results found for BUN within last 30 days.     No results found for CR within last 30 days.       No results found for AST within last 30 days.     No results found for ALT within last 30 days.     No results found for BILITOTAL within last 30 days.       _  Result Component Current Result Ref Range   WBC 5.9 (5/1/2019) 4.0 - 11.0 10e9/L     _  Result Component Current Result Ref Range   Hemoglobin 14.3 (5/1/2019) 13.3 - 17.7 g/dL     _  Result Component Current Result Ref Range   Platelet Count 163 (5/1/2019) 150 - 450 10e9/L     _  Result Component Current Result Ref Range   Absolute Neutrophil 3.5 (5/1/2019) 1.6 - 8.3 10e9/L       Assessment:  Nathan is tolerating Zytiga well. Unknown if the red rash on his hand is related to chemotherapy or not; Nathan could use a moisturizing lotion if the rash reappears and see if that helps.     Plan:  Continue Zytiga at current dose and schedule.   Apply moisturizing lotion topically to back of hand is rash reappears. Continue to monitor.    Follow-Up:  Review 5/29 Ryan appt    Refill Due:  5/31 SP    Carlos A Condon  Pharmacy Intern  Oral Chemotherapy Monitoring Program  [facility]  [phone]

## 2019-05-29 ENCOUNTER — ONCOLOGY VISIT (OUTPATIENT)
Dept: ONCOLOGY | Facility: CLINIC | Age: 69
End: 2019-05-29
Attending: INTERNAL MEDICINE
Payer: COMMERCIAL

## 2019-05-29 VITALS
BODY MASS INDEX: 37.6 KG/M2 | TEMPERATURE: 98.9 F | DIASTOLIC BLOOD PRESSURE: 79 MMHG | OXYGEN SATURATION: 95 % | SYSTOLIC BLOOD PRESSURE: 145 MMHG | HEIGHT: 74 IN | HEART RATE: 75 BPM | WEIGHT: 293 LBS | RESPIRATION RATE: 18 BRPM

## 2019-05-29 DIAGNOSIS — C61 MALIGNANT NEOPLASM OF PROSTATE (H): ICD-10-CM

## 2019-05-29 DIAGNOSIS — C61 MALIGNANT NEOPLASM OF PROSTATE (H): Primary | ICD-10-CM

## 2019-05-29 DIAGNOSIS — I26.99 BILATERAL PULMONARY EMBOLISM (H): ICD-10-CM

## 2019-05-29 DIAGNOSIS — Z79.899 ENCOUNTER FOR LONG-TERM (CURRENT) USE OF MEDICATIONS: ICD-10-CM

## 2019-05-29 LAB
ALBUMIN SERPL-MCNC: 4 G/DL (ref 3.4–5)
ALP SERPL-CCNC: 75 U/L (ref 40–150)
ALT SERPL W P-5'-P-CCNC: 39 U/L (ref 0–70)
ANION GAP SERPL CALCULATED.3IONS-SCNC: 6 MMOL/L (ref 3–14)
AST SERPL W P-5'-P-CCNC: 25 U/L (ref 0–45)
BASOPHILS # BLD AUTO: 0 10E9/L (ref 0–0.2)
BASOPHILS NFR BLD AUTO: 0.5 %
BILIRUB SERPL-MCNC: 0.5 MG/DL (ref 0.2–1.3)
BUN SERPL-MCNC: 16 MG/DL (ref 7–30)
CALCIUM SERPL-MCNC: 9.7 MG/DL (ref 8.5–10.1)
CHLORIDE SERPL-SCNC: 105 MMOL/L (ref 94–109)
CHOLEST SERPL-MCNC: 197 MG/DL
CO2 SERPL-SCNC: 28 MMOL/L (ref 20–32)
CREAT SERPL-MCNC: 1.01 MG/DL (ref 0.66–1.25)
DIFFERENTIAL METHOD BLD: NORMAL
EOSINOPHIL # BLD AUTO: 0.1 10E9/L (ref 0–0.7)
EOSINOPHIL NFR BLD AUTO: 1.4 %
ERYTHROCYTE [DISTWIDTH] IN BLOOD BY AUTOMATED COUNT: 13.7 % (ref 10–15)
GFR SERPL CREATININE-BSD FRML MDRD: 77 ML/MIN/{1.73_M2}
GLUCOSE SERPL-MCNC: 93 MG/DL (ref 70–99)
HCT VFR BLD AUTO: 40.2 % (ref 40–53)
HDLC SERPL-MCNC: 56 MG/DL
HGB BLD-MCNC: 13.9 G/DL (ref 13.3–17.7)
IMM GRANULOCYTES # BLD: 0 10E9/L (ref 0–0.4)
IMM GRANULOCYTES NFR BLD: 0.5 %
LDLC SERPL CALC-MCNC: 126 MG/DL
LYMPHOCYTES # BLD AUTO: 1.9 10E9/L (ref 0.8–5.3)
LYMPHOCYTES NFR BLD AUTO: 30.7 %
MCH RBC QN AUTO: 31 PG (ref 26.5–33)
MCHC RBC AUTO-ENTMCNC: 34.6 G/DL (ref 31.5–36.5)
MCV RBC AUTO: 90 FL (ref 78–100)
MONOCYTES # BLD AUTO: 0.4 10E9/L (ref 0–1.3)
MONOCYTES NFR BLD AUTO: 6.7 %
NEUTROPHILS # BLD AUTO: 3.8 10E9/L (ref 1.6–8.3)
NEUTROPHILS NFR BLD AUTO: 60.2 %
NONHDLC SERPL-MCNC: 141 MG/DL
NRBC # BLD AUTO: 0 10*3/UL
NRBC BLD AUTO-RTO: 0 /100
PLATELET # BLD AUTO: 167 10E9/L (ref 150–450)
POTASSIUM SERPL-SCNC: 4 MMOL/L (ref 3.4–5.3)
PROT SERPL-MCNC: 7.6 G/DL (ref 6.8–8.8)
PSA SERPL-MCNC: 5.4 UG/L (ref 0–4)
RBC # BLD AUTO: 4.49 10E12/L (ref 4.4–5.9)
SODIUM SERPL-SCNC: 139 MMOL/L (ref 133–144)
TRIGL SERPL-MCNC: 75 MG/DL
WBC # BLD AUTO: 6.3 10E9/L (ref 4–11)

## 2019-05-29 PROCEDURE — 99214 OFFICE O/P EST MOD 30 MIN: CPT | Mod: ZP | Performed by: INTERNAL MEDICINE

## 2019-05-29 PROCEDURE — 80053 COMPREHEN METABOLIC PANEL: CPT | Performed by: INTERNAL MEDICINE

## 2019-05-29 PROCEDURE — 84153 ASSAY OF PSA TOTAL: CPT | Performed by: INTERNAL MEDICINE

## 2019-05-29 PROCEDURE — 85025 COMPLETE CBC W/AUTO DIFF WBC: CPT | Performed by: INTERNAL MEDICINE

## 2019-05-29 PROCEDURE — 86316 IMMUNOASSAY TUMOR OTHER: CPT | Performed by: INTERNAL MEDICINE

## 2019-05-29 PROCEDURE — 36415 COLL VENOUS BLD VENIPUNCTURE: CPT

## 2019-05-29 PROCEDURE — 84403 ASSAY OF TOTAL TESTOSTERONE: CPT | Performed by: INTERNAL MEDICINE

## 2019-05-29 PROCEDURE — G0463 HOSPITAL OUTPT CLINIC VISIT: HCPCS | Mod: ZF

## 2019-05-29 PROCEDURE — 80061 LIPID PANEL: CPT | Performed by: INTERNAL MEDICINE

## 2019-05-29 ASSESSMENT — MIFFLIN-ST. JEOR: SCORE: 2179.04

## 2019-05-29 ASSESSMENT — PAIN SCALES - GENERAL: PAINLEVEL: NO PAIN (0)

## 2019-05-29 NOTE — NURSING NOTE
"Oncology Rooming Note    May 29, 2019 5:10 PM   Keenan Sprague is a 68 year old male who presents for:    Chief Complaint   Patient presents with     Oncology Clinic Visit     Return visit related to Prostate Cancer     Initial Vitals: /79 (BP Location: Left arm, Patient Position: Sitting, Cuff Size: Adult Large)   Pulse 75   Temp 98.9  F (37.2  C) (Oral)   Resp 18   Ht 1.88 m (6' 2.02\")   Wt 132.9 kg (293 lb)   SpO2 95%   BMI 37.60 kg/m   Estimated body mass index is 37.6 kg/m  as calculated from the following:    Height as of this encounter: 1.88 m (6' 2.02\").    Weight as of this encounter: 132.9 kg (293 lb). Body surface area is 2.63 meters squared.  No Pain (0) Comment: Data Unavailable   No LMP for male patient.  Allergies reviewed: Yes  Medications reviewed: Yes    Medications: MEDICATION REFILLS NEEDED TODAY. Provider was notified.  Pharmacy name entered into [facility]:    [facility] [place] - [place], [place] - [address]  [facility]  [facility] - [place], [place] - [address]  [facility] - [place], [place] - [address]  [facility] [place], [place] [facility] [address] [facility]    Clinical concerns: Refill needed today. Provider was notified.      Kim Guardado LPN            "
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
## 2019-05-29 NOTE — NURSING NOTE
Chief Complaint   Patient presents with     Lab Only     labs drawn via vpt by rn.     Blood drawn via vpt by RN in lab.  Maecy Harris RN

## 2019-05-31 DIAGNOSIS — C61 MALIGNANT NEOPLASM OF PROSTATE (H): Primary | ICD-10-CM

## 2019-05-31 LAB — TESTOST SERPL-MCNC: <2 NG/DL (ref 240–950)

## 2019-05-31 RX ORDER — PREDNISONE 5 MG/1
5 TABLET ORAL DAILY
Qty: 30 TABLET | Refills: 0 | Status: SHIPPED | OUTPATIENT
Start: 2019-05-31 | End: 2019-07-17

## 2019-05-31 RX ORDER — ABIRATERONE ACETATE 250 MG/1
1000 TABLET ORAL DAILY
Qty: 120 TABLET | Refills: 0 | Status: SHIPPED | OUTPATIENT
Start: 2019-05-31 | End: 2019-07-17

## 2019-06-01 LAB — CGA SERPL-MCNC: 81 NG/ML (ref 0–95)

## 2019-06-20 ENCOUNTER — TELEPHONE (OUTPATIENT)
Dept: ONCOLOGY | Facility: CLINIC | Age: 69
End: 2019-06-20

## 2019-06-20 ENCOUNTER — TRANSFERRED RECORDS (OUTPATIENT)
Dept: HEALTH INFORMATION MANAGEMENT | Facility: CLINIC | Age: 69
End: 2019-06-20

## 2019-06-20 NOTE — ORAL ONC MGMT
Oral Chemotherapy Monitoring Program    Placed call to patient in follow up of Zytiga (Abiraterone) therapy.    Left message to please call back in follow up of therapy. No patient or drug names were mentioned.    Yoselyn Ceballos  Pharmacy Intern  [facility]  [phone]

## 2019-06-27 ENCOUNTER — TELEPHONE (OUTPATIENT)
Dept: ONCOLOGY | Facility: CLINIC | Age: 69
End: 2019-06-27

## 2019-06-28 DIAGNOSIS — C61 MALIGNANT NEOPLASM OF PROSTATE (H): Primary | ICD-10-CM

## 2019-06-28 RX ORDER — PREDNISONE 5 MG/1
5 TABLET ORAL DAILY
Qty: 30 TABLET | Refills: 11 | Status: SHIPPED | OUTPATIENT
Start: 2019-06-28 | End: 2019-09-10

## 2019-06-28 RX ORDER — ABIRATERONE ACETATE 250 MG/1
1000 TABLET ORAL DAILY
Qty: 120 TABLET | Refills: 0 | Status: SHIPPED | OUTPATIENT
Start: 2019-06-28 | End: 2019-08-14

## 2019-07-02 ENCOUNTER — TELEPHONE (OUTPATIENT)
Dept: ONCOLOGY | Facility: CLINIC | Age: 69
End: 2019-07-02

## 2019-07-02 NOTE — ORAL ONC MGMT
Oral Chemotherapy Monitoring Program     Placed call to patient in follow up of Xytiga therapy.     Left message to please call back in follow-up of therapy. No patient or drug names were mentioned.     Anand White Pharmacy Intern  [facility]   [phone]   7/2/2019

## 2019-07-17 ENCOUNTER — ONCOLOGY VISIT (OUTPATIENT)
Dept: ONCOLOGY | Facility: CLINIC | Age: 69
End: 2019-07-17
Attending: INTERNAL MEDICINE
Payer: COMMERCIAL

## 2019-07-17 ENCOUNTER — APPOINTMENT (OUTPATIENT)
Dept: LAB | Facility: CLINIC | Age: 69
End: 2019-07-17
Attending: INTERNAL MEDICINE
Payer: COMMERCIAL

## 2019-07-17 VITALS
BODY MASS INDEX: 36.96 KG/M2 | HEIGHT: 74 IN | TEMPERATURE: 98.5 F | SYSTOLIC BLOOD PRESSURE: 132 MMHG | OXYGEN SATURATION: 98 % | RESPIRATION RATE: 18 BRPM | DIASTOLIC BLOOD PRESSURE: 76 MMHG | WEIGHT: 288 LBS | HEART RATE: 63 BPM

## 2019-07-17 DIAGNOSIS — C61 MALIGNANT NEOPLASM OF PROSTATE (H): Primary | ICD-10-CM

## 2019-07-17 DIAGNOSIS — Z79.899 ENCOUNTER FOR LONG-TERM (CURRENT) USE OF MEDICATIONS: ICD-10-CM

## 2019-07-17 DIAGNOSIS — I74.9 EMBOLISM AND THROMBOSIS (H): ICD-10-CM

## 2019-07-17 LAB
ALBUMIN SERPL-MCNC: 3.8 G/DL (ref 3.4–5)
ALP SERPL-CCNC: 72 U/L (ref 40–150)
ALT SERPL W P-5'-P-CCNC: 30 U/L (ref 0–70)
ANION GAP SERPL CALCULATED.3IONS-SCNC: 4 MMOL/L (ref 3–14)
AST SERPL W P-5'-P-CCNC: 19 U/L (ref 0–45)
BASOPHILS # BLD AUTO: 0 10E9/L (ref 0–0.2)
BASOPHILS NFR BLD AUTO: 0.6 %
BILIRUB SERPL-MCNC: 0.5 MG/DL (ref 0.2–1.3)
BUN SERPL-MCNC: 22 MG/DL (ref 7–30)
CALCIUM SERPL-MCNC: 8.7 MG/DL (ref 8.5–10.1)
CHLORIDE SERPL-SCNC: 107 MMOL/L (ref 94–109)
CHOLEST SERPL-MCNC: 195 MG/DL
CO2 SERPL-SCNC: 26 MMOL/L (ref 20–32)
CREAT SERPL-MCNC: 0.93 MG/DL (ref 0.66–1.25)
DIFFERENTIAL METHOD BLD: ABNORMAL
EOSINOPHIL # BLD AUTO: 0.1 10E9/L (ref 0–0.7)
EOSINOPHIL NFR BLD AUTO: 1.5 %
ERYTHROCYTE [DISTWIDTH] IN BLOOD BY AUTOMATED COUNT: 13.3 % (ref 10–15)
GFR SERPL CREATININE-BSD FRML MDRD: 85 ML/MIN/{1.73_M2}
GLUCOSE SERPL-MCNC: 101 MG/DL (ref 70–99)
HCT VFR BLD AUTO: 38.2 % (ref 40–53)
HDLC SERPL-MCNC: 54 MG/DL
HGB BLD-MCNC: 13 G/DL (ref 13.3–17.7)
IMM GRANULOCYTES # BLD: 0 10E9/L (ref 0–0.4)
IMM GRANULOCYTES NFR BLD: 0.3 %
LDLC SERPL CALC-MCNC: 121 MG/DL
LYMPHOCYTES # BLD AUTO: 1.4 10E9/L (ref 0.8–5.3)
LYMPHOCYTES NFR BLD AUTO: 22.1 %
MCH RBC QN AUTO: 31.4 PG (ref 26.5–33)
MCHC RBC AUTO-ENTMCNC: 34 G/DL (ref 31.5–36.5)
MCV RBC AUTO: 92 FL (ref 78–100)
MONOCYTES # BLD AUTO: 0.4 10E9/L (ref 0–1.3)
MONOCYTES NFR BLD AUTO: 5.8 %
NEUTROPHILS # BLD AUTO: 4.3 10E9/L (ref 1.6–8.3)
NEUTROPHILS NFR BLD AUTO: 69.7 %
NONHDLC SERPL-MCNC: 141 MG/DL
NRBC # BLD AUTO: 0 10*3/UL
NRBC BLD AUTO-RTO: 0 /100
PLATELET # BLD AUTO: 159 10E9/L (ref 150–450)
POTASSIUM SERPL-SCNC: 3.8 MMOL/L (ref 3.4–5.3)
PROT SERPL-MCNC: 7 G/DL (ref 6.8–8.8)
PSA SERPL-MCNC: 2.54 UG/L (ref 0–4)
RBC # BLD AUTO: 4.14 10E12/L (ref 4.4–5.9)
SODIUM SERPL-SCNC: 137 MMOL/L (ref 133–144)
TRIGL SERPL-MCNC: 97 MG/DL
WBC # BLD AUTO: 6.2 10E9/L (ref 4–11)

## 2019-07-17 PROCEDURE — 85025 COMPLETE CBC W/AUTO DIFF WBC: CPT | Performed by: INTERNAL MEDICINE

## 2019-07-17 PROCEDURE — 80061 LIPID PANEL: CPT | Performed by: INTERNAL MEDICINE

## 2019-07-17 PROCEDURE — G0463 HOSPITAL OUTPT CLINIC VISIT: HCPCS | Mod: ZF

## 2019-07-17 PROCEDURE — 36415 COLL VENOUS BLD VENIPUNCTURE: CPT

## 2019-07-17 PROCEDURE — 25000128 H RX IP 250 OP 636: Mod: ZF | Performed by: INTERNAL MEDICINE

## 2019-07-17 PROCEDURE — 84403 ASSAY OF TOTAL TESTOSTERONE: CPT | Performed by: INTERNAL MEDICINE

## 2019-07-17 PROCEDURE — 84153 ASSAY OF PSA TOTAL: CPT | Performed by: INTERNAL MEDICINE

## 2019-07-17 PROCEDURE — 96402 CHEMO HORMON ANTINEOPL SQ/IM: CPT

## 2019-07-17 PROCEDURE — 80053 COMPREHEN METABOLIC PANEL: CPT | Performed by: INTERNAL MEDICINE

## 2019-07-17 PROCEDURE — 99214 OFFICE O/P EST MOD 30 MIN: CPT | Mod: ZP | Performed by: INTERNAL MEDICINE

## 2019-07-17 PROCEDURE — 86316 IMMUNOASSAY TUMOR OTHER: CPT | Performed by: INTERNAL MEDICINE

## 2019-07-17 RX ADMIN — LEUPROLIDE ACETATE 22.5 MG: KIT at 18:17

## 2019-07-17 ASSESSMENT — PAIN SCALES - GENERAL: PAINLEVEL: NO PAIN (0)

## 2019-07-17 ASSESSMENT — MIFFLIN-ST. JEOR: SCORE: 2156.11
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
## 2019-07-19 LAB — TESTOST SERPL-MCNC: <2 NG/DL (ref 240–950)

## 2019-07-21 LAB — CGA SERPL-MCNC: 99 NG/ML (ref 0–95)

## 2019-07-24 ENCOUNTER — HOSPITAL ENCOUNTER (OUTPATIENT)
Dept: NUCLEAR MEDICINE | Facility: CLINIC | Age: 69
Setting detail: NUCLEAR MEDICINE
End: 2019-07-24
Attending: INTERNAL MEDICINE
Payer: COMMERCIAL

## 2019-07-24 ENCOUNTER — HOSPITAL ENCOUNTER (OUTPATIENT)
Dept: CT IMAGING | Facility: CLINIC | Age: 69
Discharge: HOME | End: 2019-07-24
Attending: INTERNAL MEDICINE | Admitting: INTERNAL MEDICINE
Payer: COMMERCIAL

## 2019-07-24 DIAGNOSIS — C61 MALIGNANT NEOPLASM OF PROSTATE (H): ICD-10-CM

## 2019-07-24 DIAGNOSIS — I74.9 EMBOLISM AND THROMBOSIS (H): ICD-10-CM

## 2019-07-24 PROCEDURE — 78306 BONE IMAGING WHOLE BODY: CPT

## 2019-07-24 PROCEDURE — 25000128 H RX IP 250 OP 636: Performed by: STUDENT IN AN ORGANIZED HEALTH CARE EDUCATION/TRAINING PROGRAM

## 2019-07-24 PROCEDURE — A9503 TC99M MEDRONATE: HCPCS | Performed by: INTERNAL MEDICINE

## 2019-07-24 PROCEDURE — 71260 CT THORAX DX C+: CPT

## 2019-07-24 PROCEDURE — 74177 CT ABD & PELVIS W/CONTRAST: CPT

## 2019-07-24 PROCEDURE — 34300033 ZZH RX 343: Performed by: INTERNAL MEDICINE

## 2019-07-24 RX ORDER — IOPAMIDOL 755 MG/ML
135 INJECTION, SOLUTION INTRAVASCULAR ONCE
Status: COMPLETED | OUTPATIENT
Start: 2019-07-24 | End: 2019-07-24

## 2019-07-24 RX ORDER — TC 99M MEDRONATE 20 MG/10ML
20-30 INJECTION, POWDER, LYOPHILIZED, FOR SOLUTION INTRAVENOUS ONCE
Status: COMPLETED | OUTPATIENT
Start: 2019-07-24 | End: 2019-07-24

## 2019-07-24 RX ADMIN — TC 99M MEDRONATE 24.6 MCI.: 20 INJECTION, POWDER, LYOPHILIZED, FOR SOLUTION INTRAVENOUS at 12:15

## 2019-07-24 RX ADMIN — IOPAMIDOL 135 ML: 755 INJECTION, SOLUTION INTRAVENOUS at 13:16

## 2019-07-26 ENCOUNTER — PATIENT OUTREACH (OUTPATIENT)
Dept: ONCOLOGY | Facility: CLINIC | Age: 69
End: 2019-07-26

## 2019-07-26 DIAGNOSIS — C61 MALIGNANT NEOPLASM OF PROSTATE (H): Primary | ICD-10-CM

## 2019-07-26 RX ORDER — ABIRATERONE ACETATE 250 MG/1
1000 TABLET ORAL DAILY
Qty: 120 TABLET | Refills: 0 | Status: SHIPPED | OUTPATIENT
Start: 2019-07-26 | End: 2019-09-10

## 2019-07-30 ENCOUNTER — TELEPHONE (OUTPATIENT)
Dept: ONCOLOGY | Facility: CLINIC | Age: 69
End: 2019-07-30

## 2019-07-30 NOTE — PROGRESS NOTES
TC to pt per Dr. Davis:      Jerardo Davis MD Jankovich, Diana, RN             Camryn, could you let Nathan know that his cancer has shrunk on the CT scan. Excellent news! Will discuss all other findings on follow-up visit.   Thanks,   AR      Pt stated understanding of and was very happy to hear the news. Denies any further questions or concerns at this time, but agrees to call clinic if any arise.

## 2019-07-30 NOTE — ORAL ONC MGMT
Oral Chemotherapy Monitoring Program     Primary Oncologist: Dr. Davis  Primary Oncology Clinic: [facility]  Cancer Diagnosis: Prostate Cancer     Therapy History:  Zytiga (abiraterone) 1000mg ([id number]) daily, continuously  Started 4/9/19     Drug Interaction Assessment: No new drug interactions identified.     Lab Monitoring Plan  CMP & CBC monthly    Subjective/Objective:  Keenan Sprague is a 68 year old male contacted by phone for a follow-up visit for oral chemotherapy. He confirmed correct dosing of 4 tablets (1000mg) taken once daily on an empty stomach. He also confirms taking prednison and xarelto once daily with breakfast. He reports having hot flashes that are self revolving and he has discussed with Dr. Davis. He denies other side effects including edema, nausea, diarrhea. He denies missed doses or medication changes. He reports the clinic reached out to him about scan results which he was happy showed decreased size of lymph nodes.     ORAL CHEMOTHERAPY 4/3/2019 4/15/2019 5/15/2019 7/30/2019   Drug Name Zytiga (Abiraterone) Zytiga (Abiraterone) Zytiga (Abiraterone) Zytiga (Abiraterone)   Current Dosage 1000mg 1000mg 1000mg 1000mg   Current Schedule Daily Daily Daily Daily   Cycle Details Continuous Continuous Continuous Continuous   Start Date of Last Cycle - 4/9/2019 - 7/8/2019   Planned next cycle start date - - - 8/7/2019   Doses missed in last 2 weeks - - 0 0   Adherence Assessment - Adherent Adherent Adherent   Adverse Effects - No AE identified during assessment No AE identified during assessment Other (see note for details)   Other (see note for details) - - - hot flashes   Pharmacist intervention? - - - Yes   Intervention(s) - - - Referral to oncology provider   Any new drug interactions? No No No No   Is the dose as ordered appropriate for the patient? Yes Yes - Yes   Has the patient been assessed within the past 6 months for depression? - - - Yes   Has the patient missed any days of  "school, work, or other routine activity? - - - No       Vitals:  BP:   BP Readings from Last 1 Encounters:   07/17/19 132/76     Wt Readings from Last 1 Encounters:   07/17/19 130.6 kg (288 lb)     Estimated body surface area is 2.61 meters squared as calculated from the following:    Height as of 7/17/19: 1.88 m (6' 2\").    Weight as of 7/17/19: 130.6 kg (288 lb).    Labs:  _  Result Component Current Result Ref Range   Sodium 137 (7/17/2019) 133 - 144 mmol/L     _  Result Component Current Result Ref Range   Potassium 3.8 (7/17/2019) 3.4 - 5.3 mmol/L     _  Result Component Current Result Ref Range   Calcium 8.7 (7/17/2019) 8.5 - 10.1 mg/dL     No results found for Mag within last 30 days.     No results found for Phos within last 30 days.     _  Result Component Current Result Ref Range   Albumin 3.8 (7/17/2019) 3.4 - 5.0 g/dL     _  Result Component Current Result Ref Range   Urea Nitrogen 22 (7/17/2019) 7 - 30 mg/dL     _  Result Component Current Result Ref Range   Creatinine 0.93 (7/17/2019) 0.66 - 1.25 mg/dL       _  Result Component Current Result Ref Range   AST 19 (7/17/2019) 0 - 45 U/L     _  Result Component Current Result Ref Range   ALT 30 (7/17/2019) 0 - 70 U/L     _  Result Component Current Result Ref Range   Bilirubin Total 0.5 (7/17/2019) 0.2 - 1.3 mg/dL       _  Result Component Current Result Ref Range   WBC 6.2 (7/17/2019) 4.0 - 11.0 10e9/L     _  Result Component Current Result Ref Range   Hemoglobin 13.0 (L) (7/17/2019) 13.3 - 17.7 g/dL     _  Result Component Current Result Ref Range   Platelet Count 159 (7/17/2019) 150 - 450 10e9/L     _  Result Component Current Result Ref Range   Absolute Neutrophil 4.3 (7/17/2019) 1.6 - 8.3 10e9/L       Assessment:  Nathan is tolerating zytiga with the exception of hot flashes. They are bothersome but has discussed with Dr. Davis. Discussed that effective medications exist if he finds the hot flashes bothersome.     Adherence: PDC 1.0 and denies missed " doses. No issue with adherence.     Plan:  Continue zytiga + prednisone. Has follow up with Dr. Davis in August    Follow-Up:  8/30: monthly follow up and review of 8/14 appt    Refill Due:  Zytiga/prednisone/xarelto will be picked up at [facility] pharmacy ([address]) on 8/1    Chemo oTdd, PharmD, MS  Hematology/Oncology Clinical Pharmacist  [facility]  [facility]  [phone]

## 2019-08-14 ENCOUNTER — APPOINTMENT (OUTPATIENT)
Dept: LAB | Facility: CLINIC | Age: 69
End: 2019-08-14
Attending: INTERNAL MEDICINE
Payer: COMMERCIAL

## 2019-08-14 ENCOUNTER — ONCOLOGY VISIT (OUTPATIENT)
Dept: ONCOLOGY | Facility: CLINIC | Age: 69
End: 2019-08-14
Attending: INTERNAL MEDICINE
Payer: COMMERCIAL

## 2019-08-14 VITALS
SYSTOLIC BLOOD PRESSURE: 127 MMHG | TEMPERATURE: 98.7 F | DIASTOLIC BLOOD PRESSURE: 65 MMHG | RESPIRATION RATE: 20 BRPM | HEART RATE: 73 BPM | OXYGEN SATURATION: 97 % | WEIGHT: 288.2 LBS | BODY MASS INDEX: 37 KG/M2

## 2019-08-14 DIAGNOSIS — C61 MALIGNANT NEOPLASM OF PROSTATE (H): ICD-10-CM

## 2019-08-14 DIAGNOSIS — Z79.899 ENCOUNTER FOR LONG-TERM (CURRENT) USE OF MEDICATIONS: ICD-10-CM

## 2019-08-14 LAB
ALBUMIN SERPL-MCNC: 4.1 G/DL (ref 3.4–5)
ALP SERPL-CCNC: 78 U/L (ref 40–150)
ALT SERPL W P-5'-P-CCNC: 31 U/L (ref 0–70)
ANION GAP SERPL CALCULATED.3IONS-SCNC: 6 MMOL/L (ref 3–14)
AST SERPL W P-5'-P-CCNC: 19 U/L (ref 0–45)
BASOPHILS # BLD AUTO: 0 10E9/L (ref 0–0.2)
BASOPHILS NFR BLD AUTO: 0.6 %
BILIRUB SERPL-MCNC: 0.4 MG/DL (ref 0.2–1.3)
BUN SERPL-MCNC: 22 MG/DL (ref 7–30)
CALCIUM SERPL-MCNC: 9.2 MG/DL (ref 8.5–10.1)
CHLORIDE SERPL-SCNC: 106 MMOL/L (ref 94–109)
CHOLEST SERPL-MCNC: 212 MG/DL
CO2 SERPL-SCNC: 27 MMOL/L (ref 20–32)
CREAT SERPL-MCNC: 0.96 MG/DL (ref 0.66–1.25)
DIFFERENTIAL METHOD BLD: NORMAL
EOSINOPHIL # BLD AUTO: 0.1 10E9/L (ref 0–0.7)
EOSINOPHIL NFR BLD AUTO: 1.3 %
ERYTHROCYTE [DISTWIDTH] IN BLOOD BY AUTOMATED COUNT: 13.7 % (ref 10–15)
GFR SERPL CREATININE-BSD FRML MDRD: 81 ML/MIN/{1.73_M2}
GLUCOSE SERPL-MCNC: 91 MG/DL (ref 70–99)
HCT VFR BLD AUTO: 41.5 % (ref 40–53)
HDLC SERPL-MCNC: 64 MG/DL
HGB BLD-MCNC: 14 G/DL (ref 13.3–17.7)
IMM GRANULOCYTES # BLD: 0 10E9/L (ref 0–0.4)
IMM GRANULOCYTES NFR BLD: 0.6 %
LDLC SERPL CALC-MCNC: 125 MG/DL
LYMPHOCYTES # BLD AUTO: 1.7 10E9/L (ref 0.8–5.3)
LYMPHOCYTES NFR BLD AUTO: 24 %
MCH RBC QN AUTO: 31.7 PG (ref 26.5–33)
MCHC RBC AUTO-ENTMCNC: 33.7 G/DL (ref 31.5–36.5)
MCV RBC AUTO: 94 FL (ref 78–100)
MONOCYTES # BLD AUTO: 0.5 10E9/L (ref 0–1.3)
MONOCYTES NFR BLD AUTO: 7 %
NEUTROPHILS # BLD AUTO: 4.8 10E9/L (ref 1.6–8.3)
NEUTROPHILS NFR BLD AUTO: 66.5 %
NONHDLC SERPL-MCNC: 149 MG/DL
NRBC # BLD AUTO: 0 10*3/UL
NRBC BLD AUTO-RTO: 0 /100
PLATELET # BLD AUTO: 178 10E9/L (ref 150–450)
POTASSIUM SERPL-SCNC: 4 MMOL/L (ref 3.4–5.3)
PROT SERPL-MCNC: 7.8 G/DL (ref 6.8–8.8)
PSA SERPL-MCNC: 1.46 UG/L (ref 0–4)
RBC # BLD AUTO: 4.41 10E12/L (ref 4.4–5.9)
SODIUM SERPL-SCNC: 138 MMOL/L (ref 133–144)
TRIGL SERPL-MCNC: 122 MG/DL
WBC # BLD AUTO: 7.1 10E9/L (ref 4–11)

## 2019-08-14 PROCEDURE — 84403 ASSAY OF TOTAL TESTOSTERONE: CPT | Performed by: INTERNAL MEDICINE

## 2019-08-14 PROCEDURE — 80061 LIPID PANEL: CPT | Performed by: INTERNAL MEDICINE

## 2019-08-14 PROCEDURE — 80053 COMPREHEN METABOLIC PANEL: CPT | Performed by: INTERNAL MEDICINE

## 2019-08-14 PROCEDURE — 86316 IMMUNOASSAY TUMOR OTHER: CPT | Performed by: INTERNAL MEDICINE

## 2019-08-14 PROCEDURE — 84153 ASSAY OF PSA TOTAL: CPT | Performed by: INTERNAL MEDICINE

## 2019-08-14 PROCEDURE — 99214 OFFICE O/P EST MOD 30 MIN: CPT | Mod: GC | Performed by: INTERNAL MEDICINE

## 2019-08-14 PROCEDURE — G0463 HOSPITAL OUTPT CLINIC VISIT: HCPCS | Mod: ZF

## 2019-08-14 PROCEDURE — 36415 COLL VENOUS BLD VENIPUNCTURE: CPT

## 2019-08-14 PROCEDURE — 85025 COMPLETE CBC W/AUTO DIFF WBC: CPT | Performed by: INTERNAL MEDICINE

## 2019-08-14 ASSESSMENT — PAIN SCALES - GENERAL: PAINLEVEL: NO PAIN (0)
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
## 2019-08-14 NOTE — NURSING NOTE
"Oncology Rooming Note    August 14, 2019 5:11 PM   Keenan Sprague is a 68 year old male who presents for:    Chief Complaint   Patient presents with     Blood Draw     Venipuncture labs collected by RN.      Oncology Clinic Visit     Prostate Cancer- 1 month f/u     Initial Vitals: /65 (BP Location: Right arm, Patient Position: Sitting)   Pulse 73   Temp 98.7  F (37.1  C) (Oral)   Resp 20   Wt 130.7 kg (288 lb 3.2 oz)   SpO2 97%   BMI 37.00 kg/m   Estimated body mass index is 37 kg/m  as calculated from the following:    Height as of 7/17/19: 1.88 m (6' 2\").    Weight as of this encounter: 130.7 kg (288 lb 3.2 oz). Body surface area is 2.61 meters squared.  No Pain (0) Comment: Data Unavailable   No LMP for male patient.  Allergies reviewed: Yes  Medications reviewed: Yes    Medications: Medication refills not needed today.  Pharmacy name entered into [facility]:    [facility] - [place], [place] - [address]  [facility] [place] - [address] [id number] - [place], [place] - [address]  [facility] - [place], [place] - [address] [facility]    Clinical concerns: n/a       JOHAN BERGER CMA              "

## 2019-08-16 LAB — TESTOST SERPL-MCNC: <2 NG/DL (ref 240–950)

## 2019-08-18 LAB — CGA SERPL-MCNC: 111 NG/ML (ref 0–95)

## 2019-08-27 ENCOUNTER — HOSPITAL ENCOUNTER (EMERGENCY)
Facility: CLINIC | Age: 69
Discharge: HOME | End: 2019-08-27
Attending: EMERGENCY MEDICINE | Admitting: EMERGENCY MEDICINE
Payer: COMMERCIAL

## 2019-08-27 ENCOUNTER — HOSPITAL ENCOUNTER (EMERGENCY)
Facility: CLINIC | Age: 69
Discharge: HOME | End: 2019-08-27
Attending: PHYSICIAN ASSISTANT | Admitting: PHYSICIAN ASSISTANT
Payer: COMMERCIAL

## 2019-08-27 ENCOUNTER — TELEPHONE (OUTPATIENT)
Dept: INTERNAL MEDICINE | Facility: CLINIC | Age: 69
End: 2019-08-27

## 2019-08-27 ENCOUNTER — NURSE TRIAGE (OUTPATIENT)
Dept: NURSING | Facility: CLINIC | Age: 69
End: 2019-08-27

## 2019-08-27 ENCOUNTER — PATIENT OUTREACH (OUTPATIENT)
Dept: ONCOLOGY | Facility: CLINIC | Age: 69
End: 2019-08-27

## 2019-08-27 ENCOUNTER — APPOINTMENT (OUTPATIENT)
Dept: CT IMAGING | Facility: CLINIC | Age: 69
End: 2019-08-27
Attending: PHYSICIAN ASSISTANT
Payer: COMMERCIAL

## 2019-08-27 ENCOUNTER — ALLIED HEALTH/NURSE VISIT (OUTPATIENT)
Dept: NURSING | Facility: CLINIC | Age: 69
End: 2019-08-27
Payer: COMMERCIAL

## 2019-08-27 VITALS
RESPIRATION RATE: 18 BRPM | OXYGEN SATURATION: 98 % | TEMPERATURE: 98.1 F | HEART RATE: 64 BPM | SYSTOLIC BLOOD PRESSURE: 179 MMHG | DIASTOLIC BLOOD PRESSURE: 106 MMHG

## 2019-08-27 VITALS
DIASTOLIC BLOOD PRESSURE: 81 MMHG | OXYGEN SATURATION: 98 % | HEART RATE: 75 BPM | TEMPERATURE: 98 F | RESPIRATION RATE: 20 BRPM | SYSTOLIC BLOOD PRESSURE: 154 MMHG

## 2019-08-27 DIAGNOSIS — R31.0 GROSS HEMATURIA: ICD-10-CM

## 2019-08-27 DIAGNOSIS — R30.0 DYSURIA: ICD-10-CM

## 2019-08-27 DIAGNOSIS — K40.90 UNILATERAL INGUINAL HERNIA WITHOUT OBSTRUCTION OR GANGRENE, RECURRENCE NOT SPECIFIED: ICD-10-CM

## 2019-08-27 DIAGNOSIS — R33.9 URINARY RETENTION: ICD-10-CM

## 2019-08-27 DIAGNOSIS — Z97.8 FOLEY CATHETER IN PLACE: ICD-10-CM

## 2019-08-27 DIAGNOSIS — Z85.46 HISTORY OF PROSTATE CANCER: ICD-10-CM

## 2019-08-27 DIAGNOSIS — Z53.9 DIAGNOSIS FOR ++++ WALK IN CLINIC ++++: Primary | ICD-10-CM

## 2019-08-27 LAB
ALBUMIN SERPL-MCNC: 3.6 G/DL (ref 3.4–5)
ALBUMIN UR-MCNC: 70 MG/DL
ALBUMIN UR-MCNC: ABNORMAL MG/DL
ALP SERPL-CCNC: 68 U/L (ref 40–150)
ALT SERPL W P-5'-P-CCNC: 31 U/L (ref 0–70)
ANION GAP SERPL CALCULATED.3IONS-SCNC: 3 MMOL/L (ref 3–14)
ANION GAP SERPL CALCULATED.3IONS-SCNC: 3 MMOL/L (ref 3–14)
APPEARANCE UR: ABNORMAL
APPEARANCE UR: ABNORMAL
AST SERPL W P-5'-P-CCNC: 23 U/L (ref 0–45)
BACTERIA #/AREA URNS HPF: ABNORMAL /HPF
BASOPHILS # BLD AUTO: 0 10E9/L (ref 0–0.2)
BASOPHILS # BLD AUTO: 0 10E9/L (ref 0–0.2)
BASOPHILS NFR BLD AUTO: 0.4 %
BASOPHILS NFR BLD AUTO: 0.7 %
BILIRUB DIRECT SERPL-MCNC: 0.1 MG/DL (ref 0–0.2)
BILIRUB SERPL-MCNC: 0.5 MG/DL (ref 0.2–1.3)
BILIRUB UR QL STRIP: ABNORMAL
BILIRUB UR QL STRIP: NEGATIVE
BUN SERPL-MCNC: 15 MG/DL (ref 7–30)
BUN SERPL-MCNC: 16 MG/DL (ref 7–30)
CALCIUM SERPL-MCNC: 8.9 MG/DL (ref 8.5–10.1)
CALCIUM SERPL-MCNC: 9 MG/DL (ref 8.5–10.1)
CHLORIDE SERPL-SCNC: 109 MMOL/L (ref 94–109)
CHLORIDE SERPL-SCNC: 109 MMOL/L (ref 94–109)
CO2 SERPL-SCNC: 28 MMOL/L (ref 20–32)
CO2 SERPL-SCNC: 29 MMOL/L (ref 20–32)
COLOR UR AUTO: ABNORMAL
COLOR UR AUTO: ABNORMAL
CREAT SERPL-MCNC: 0.89 MG/DL (ref 0.66–1.25)
CREAT SERPL-MCNC: 1.02 MG/DL (ref 0.66–1.25)
DIFFERENTIAL METHOD BLD: ABNORMAL
DIFFERENTIAL METHOD BLD: ABNORMAL
EOSINOPHIL # BLD AUTO: 0.1 10E9/L (ref 0–0.7)
EOSINOPHIL # BLD AUTO: 0.2 10E9/L (ref 0–0.7)
EOSINOPHIL NFR BLD AUTO: 1.7 %
EOSINOPHIL NFR BLD AUTO: 3.8 %
ERYTHROCYTE [DISTWIDTH] IN BLOOD BY AUTOMATED COUNT: 13.4 % (ref 10–15)
ERYTHROCYTE [DISTWIDTH] IN BLOOD BY AUTOMATED COUNT: 13.6 % (ref 10–15)
GFR SERPL CREATININE-BSD FRML MDRD: 76 ML/MIN/{1.73_M2}
GFR SERPL CREATININE-BSD FRML MDRD: 88 ML/MIN/{1.73_M2}
GLUCOSE SERPL-MCNC: 107 MG/DL (ref 70–99)
GLUCOSE SERPL-MCNC: 94 MG/DL (ref 70–99)
GLUCOSE UR STRIP-MCNC: ABNORMAL MG/DL
GLUCOSE UR STRIP-MCNC: NEGATIVE MG/DL
HCT VFR BLD AUTO: 39.8 % (ref 40–53)
HCT VFR BLD AUTO: 40.2 % (ref 40–53)
HGB BLD-MCNC: 13.1 G/DL (ref 13.3–17.7)
HGB BLD-MCNC: 13.2 G/DL (ref 13.3–17.7)
HGB UR QL STRIP: ABNORMAL
HGB UR QL STRIP: ABNORMAL
IMM GRANULOCYTES # BLD: 0 10E9/L (ref 0–0.4)
IMM GRANULOCYTES # BLD: 0 10E9/L (ref 0–0.4)
IMM GRANULOCYTES NFR BLD: 0.4 %
IMM GRANULOCYTES NFR BLD: 0.7 %
KETONES UR STRIP-MCNC: ABNORMAL MG/DL
KETONES UR STRIP-MCNC: NEGATIVE MG/DL
LEUKOCYTE ESTERASE UR QL STRIP: ABNORMAL
LEUKOCYTE ESTERASE UR QL STRIP: NEGATIVE
LYMPHOCYTES # BLD AUTO: 1.1 10E9/L (ref 0.8–5.3)
LYMPHOCYTES # BLD AUTO: 1.4 10E9/L (ref 0.8–5.3)
LYMPHOCYTES NFR BLD AUTO: 15.2 %
LYMPHOCYTES NFR BLD AUTO: 24 %
MCH RBC QN AUTO: 31 PG (ref 26.5–33)
MCH RBC QN AUTO: 31.1 PG (ref 26.5–33)
MCHC RBC AUTO-ENTMCNC: 32.6 G/DL (ref 31.5–36.5)
MCHC RBC AUTO-ENTMCNC: 33.2 G/DL (ref 31.5–36.5)
MCV RBC AUTO: 94 FL (ref 78–100)
MCV RBC AUTO: 95 FL (ref 78–100)
MONOCYTES # BLD AUTO: 0.4 10E9/L (ref 0–1.3)
MONOCYTES # BLD AUTO: 0.4 10E9/L (ref 0–1.3)
MONOCYTES NFR BLD AUTO: 5.5 %
MONOCYTES NFR BLD AUTO: 6.8 %
MUCOUS THREADS #/AREA URNS LPF: PRESENT /LPF
NEUTROPHILS # BLD AUTO: 3.9 10E9/L (ref 1.6–8.3)
NEUTROPHILS # BLD AUTO: 5.8 10E9/L (ref 1.6–8.3)
NEUTROPHILS NFR BLD AUTO: 64 %
NEUTROPHILS NFR BLD AUTO: 76.8 %
NITRATE UR QL: ABNORMAL
NITRATE UR QL: NEGATIVE
NRBC # BLD AUTO: 0 10*3/UL
NRBC # BLD AUTO: 0 10*3/UL
NRBC BLD AUTO-RTO: 0 /100
NRBC BLD AUTO-RTO: 0 /100
NT-PROBNP SERPL-MCNC: 413 PG/ML (ref 0–900)
PH UR STRIP: 5.5 PH (ref 5–7)
PH UR STRIP: ABNORMAL PH (ref 5–7)
PLATELET # BLD AUTO: 147 10E9/L (ref 150–450)
PLATELET # BLD AUTO: 154 10E9/L (ref 150–450)
POTASSIUM SERPL-SCNC: 3.7 MMOL/L (ref 3.4–5.3)
POTASSIUM SERPL-SCNC: 3.9 MMOL/L (ref 3.4–5.3)
PROT SERPL-MCNC: 6.8 G/DL (ref 6.8–8.8)
RBC # BLD AUTO: 4.23 10E12/L (ref 4.4–5.9)
RBC # BLD AUTO: 4.24 10E12/L (ref 4.4–5.9)
RBC #/AREA URNS AUTO: >182 /HPF (ref 0–2)
RBC #/AREA URNS AUTO: >182 /HPF (ref 0–2)
SODIUM SERPL-SCNC: 140 MMOL/L (ref 133–144)
SODIUM SERPL-SCNC: 141 MMOL/L (ref 133–144)
SOURCE: ABNORMAL
SOURCE: ABNORMAL
SP GR UR STRIP: 1.02 (ref 1–1.03)
SP GR UR STRIP: ABNORMAL (ref 1–1.03)
UROBILINOGEN UR STRIP-MCNC: ABNORMAL MG/DL (ref 0–2)
UROBILINOGEN UR STRIP-MCNC: NORMAL MG/DL (ref 0–2)
WBC # BLD AUTO: 6 10E9/L (ref 4–11)
WBC # BLD AUTO: 7.5 10E9/L (ref 4–11)
WBC #/AREA URNS AUTO: 4 /HPF (ref 0–5)
WBC #/AREA URNS AUTO: 5 /HPF (ref 0–5)

## 2019-08-27 PROCEDURE — 99285 EMERGENCY DEPT VISIT HI MDM: CPT | Mod: 25

## 2019-08-27 PROCEDURE — 80048 BASIC METABOLIC PNL TOTAL CA: CPT | Performed by: PHYSICIAN ASSISTANT

## 2019-08-27 PROCEDURE — 81001 URINALYSIS AUTO W/SCOPE: CPT | Performed by: EMERGENCY MEDICINE

## 2019-08-27 PROCEDURE — 80048 BASIC METABOLIC PNL TOTAL CA: CPT | Performed by: EMERGENCY MEDICINE

## 2019-08-27 PROCEDURE — 87086 URINE CULTURE/COLONY COUNT: CPT | Performed by: EMERGENCY MEDICINE

## 2019-08-27 PROCEDURE — 51702 INSERT TEMP BLADDER CATH: CPT

## 2019-08-27 PROCEDURE — 85025 COMPLETE CBC W/AUTO DIFF WBC: CPT | Performed by: PHYSICIAN ASSISTANT

## 2019-08-27 PROCEDURE — 25000132 ZZH RX MED GY IP 250 OP 250 PS 637: Performed by: PHYSICIAN ASSISTANT

## 2019-08-27 PROCEDURE — 74176 CT ABD & PELVIS W/O CONTRAST: CPT

## 2019-08-27 PROCEDURE — 96374 THER/PROPH/DIAG INJ IV PUSH: CPT

## 2019-08-27 PROCEDURE — 36415 COLL VENOUS BLD VENIPUNCTURE: CPT | Performed by: EMERGENCY MEDICINE

## 2019-08-27 PROCEDURE — 81001 URINALYSIS AUTO W/SCOPE: CPT | Performed by: PHYSICIAN ASSISTANT

## 2019-08-27 PROCEDURE — 83880 ASSAY OF NATRIURETIC PEPTIDE: CPT | Performed by: PHYSICIAN ASSISTANT

## 2019-08-27 PROCEDURE — 80076 HEPATIC FUNCTION PANEL: CPT | Performed by: PHYSICIAN ASSISTANT

## 2019-08-27 PROCEDURE — 25000125 ZZHC RX 250

## 2019-08-27 PROCEDURE — 99283 EMERGENCY DEPT VISIT LOW MDM: CPT

## 2019-08-27 PROCEDURE — 85025 COMPLETE CBC W/AUTO DIFF WBC: CPT | Performed by: EMERGENCY MEDICINE

## 2019-08-27 PROCEDURE — 25000128 H RX IP 250 OP 636: Performed by: PHYSICIAN ASSISTANT

## 2019-08-27 RX ORDER — HYDROMORPHONE HYDROCHLORIDE 1 MG/ML
0.5 INJECTION, SOLUTION INTRAMUSCULAR; INTRAVENOUS; SUBCUTANEOUS ONCE
Status: COMPLETED | OUTPATIENT
Start: 2019-08-27 | End: 2019-08-27

## 2019-08-27 RX ORDER — PHENAZOPYRIDINE HYDROCHLORIDE 100 MG/1
100 TABLET, FILM COATED ORAL 3 TIMES DAILY
Qty: 9 TABLET | Refills: 0 | Status: SHIPPED | OUTPATIENT
Start: 2019-08-27 | End: 2019-09-10

## 2019-08-27 RX ORDER — LIDOCAINE HYDROCHLORIDE 20 MG/ML
JELLY TOPICAL DAILY
Qty: 30 ML | Refills: 0 | Status: SHIPPED | OUTPATIENT
Start: 2019-08-27 | End: 2019-09-10

## 2019-08-27 RX ORDER — LIDOCAINE HYDROCHLORIDE 20 MG/ML
JELLY TOPICAL
Status: DISCONTINUED
Start: 2019-08-27 | End: 2019-08-27 | Stop reason: HOSPADM

## 2019-08-27 RX ORDER — PHENAZOPYRIDINE HYDROCHLORIDE 100 MG/1
200 TABLET, FILM COATED ORAL ONCE
Status: COMPLETED | OUTPATIENT
Start: 2019-08-27 | End: 2019-08-27

## 2019-08-27 RX ORDER — LIDOCAINE HYDROCHLORIDE 20 MG/ML
JELLY TOPICAL
Status: COMPLETED
Start: 2019-08-27 | End: 2019-08-27

## 2019-08-27 RX ORDER — OXYBUTYNIN CHLORIDE 5 MG/1
5 TABLET ORAL 2 TIMES DAILY
Qty: 10 TABLET | Refills: 0 | Status: SHIPPED | OUTPATIENT
Start: 2019-08-27 | End: 2019-09-10

## 2019-08-27 RX ADMIN — PHENAZOPYRIDINE HYDROCHLORIDE 200 MG: 100 TABLET, FILM COATED ORAL at 18:18

## 2019-08-27 RX ADMIN — LIDOCAINE HYDROCHLORIDE 5 ML: 20 JELLY TOPICAL at 19:09

## 2019-08-27 RX ADMIN — HYDROMORPHONE HYDROCHLORIDE 0.5 MG: 1 INJECTION, SOLUTION INTRAMUSCULAR; INTRAVENOUS; SUBCUTANEOUS at 19:08

## 2019-08-27 ASSESSMENT — ENCOUNTER SYMPTOMS
CHILLS: 0
DIFFICULTY URINATING: 1
ABDOMINAL PAIN: 0
HEMATURIA: 1
NAUSEA: 0
VOMITING: 0
ABDOMINAL PAIN: 1
HEMATURIA: 1
DYSURIA: 0
FLANK PAIN: 0
DYSURIA: 1
BACK PAIN: 1
FEVER: 0
FEVER: 0
VOMITING: 0

## 2019-08-27 NOTE — ED TRIAGE NOTES
Patient arrives with complaints of dysuria. Was seen and worked up here this morning. Reports significant pain with voiding now. Patient does have leg bag that is draining. States he won't be able to get into a urologist for 9 days. History of prostate cancer.

## 2019-08-27 NOTE — TELEPHONE ENCOUNTER
Reason for Call:   letter    Detailed comments: Patient presented at the  of the clinic asking for a letter stating he can not return to work due to the pain he is experiencing as addressed in the ED visit from today.     Phone Number Patient can be reached at: Cell number on file:    Telephone Information:   Mobile [phone]       Best Time: any    Can we leave a detailed message on this number? YES    Call taken on 8/27/2019 at 3:58 PM by Rica Howard

## 2019-08-27 NOTE — ED AVS SNAPSHOT
[facility] Emergency Department  [address]  [facility] [address]  Phone:  [phone]  Fax:  [phone]                                    Keenan Sprague   MRN: [id number]    Department:  [facility] Emergency Department   Date of Visit:  8/27/2019           After Visit Summary Signature Page    I have received my discharge instructions, and my questions have been answered. I have discussed any challenges I see with this plan with the nurse or doctor.    ..........................................................................................................................................  Patient/Patient Representative Signature      ..........................................................................................................................................  Patient Representative Print Name and Relationship to Patient    ..................................................               ................................................  Date                                   Time    ..........................................................................................................................................  Reviewed by Signature/Title    ...................................................              ..............................................  Date                                               Time          22EPIC Rev 08/18

## 2019-08-27 NOTE — LETTER
[place] [address]  [facility] [address]  [phone]          August 27, 2019    RE:  Keenan Sprague                                                                                                                                                       [address] [facility] [address]            To whom it may concern:    Keenan Sprague is under my professional care.   He was seen in the [facility] ER 8/27/19.   He needs assessment from urology and is unable to work until 9/5/2019      Sincerely,        Jono Huber MD

## 2019-08-27 NOTE — ED AVS SNAPSHOT
[facility] Emergency Department  [address]  [facility] [address]  Phone:  [phone]  Fax:  [phone]                                    Keenan Sprague   MRN: [id number]    Department:  [facility] Emergency Department   Date of Visit:  8/27/2019           After Visit Summary Signature Page    I have received my discharge instructions, and my questions have been answered. I have discussed any challenges I see with this plan with the nurse or doctor.    ..........................................................................................................................................  Patient/Patient Representative Signature      ..........................................................................................................................................  Patient Representative Print Name and Relationship to Patient    ..................................................               ................................................  Date                                   Time    ..........................................................................................................................................  Reviewed by Signature/Title    ...................................................              ..............................................  Date                                               Time          22EPIC Rev 08/18

## 2019-08-27 NOTE — TELEPHONE ENCOUNTER
Please fax to [phone], patient needs letter to be off work until at least 9/5/19 after his scheduled appointment with urology.

## 2019-08-27 NOTE — ED PROVIDER NOTES
History     Chief Complaint:  Hematuria     The history is provided by the patient and medical records.      Keenan Sprague is a 68 year old male with a history of stage IV prostate cancer on oral Zytiga chemotherapy (treated at the [facility] by Dr. Davis) and PE on Xarelto who presents for evaluation of hematuria. He last saw Dr. Rodgers of urology in March 2019, but reports he does not currently follow with him. Two days ago he had gross hematuria but yesterday throughout the day his urine was clear. The hematuria returned yesterday evening slightly and he presents because he had significant persistent gross hematuria this morning. He also has been having feeling of urinary retention and some hesitancy. He did pass a small clot when urinating upon arrival to the ER. He has had mild right flank discomfort but denies true or current pain. He has never had hematuria before. He denies fever, vomiting, and dysuria.     Allergies:  Ibuprofen     Medications:    Compazine   Xarelto    Prednisone  Zytiga     Past Medical History:    Embolism and thrombosis   Lipomatosis  Prostate cancer   Obstructive Sleep Apnea   Anxiety  Obesity     Past Surgical History:    Bone graft  Seed implant for prostate cancer  Lipoma removal   ORIF clavicle  ORIF wrist  Tonsillectomy     Family History:    Mother - lumbar fusion  Father - cancer     Social History:  The patient presented alone.  Smoking Status: Former smoker 0.25 PPD for 35 years  Smokeless Tobacco: Never  Alcohol Use: Yes   Marital Status:        Review of Systems   Constitutional: Negative for fever.   Gastrointestinal: Negative for abdominal pain and vomiting.   Genitourinary: Positive for difficulty urinating and hematuria. Negative for dysuria and flank pain (right sided discomfort now resolved, but denies pain).   All other systems reviewed and are negative.    Physical Exam     Patient Vitals for the past 24 hrs:   Temp Temp src Heart Rate SpO2   08/27/19 0810  98.1  F (36.7  C) Oral 60 97 %      Physical Exam  General: Well-developed and well-nourished. Well appearing middle aged  man. Cooperative.  Head:  Atraumatic.  Eyes:  Conjunctivae, lids, and sclerae are normal.  ENT:    Normal nose. Moist mucous membranes.  Neck:  Supple. Normal range of motion.  CV:  Regular rate and rhythm. Normal heart sounds with no murmurs, rubs, or gallops detected.  Resp:  No respiratory distress. Clear to auscultation bilaterally without decreased breath sounds, wheezing, rales, or rhonchi.  GI:  Soft. Non-distended. Non-tender. No CVA tenderness.  :  Normal external male genitalia, circumcised.  No testicular masses or tenderness.  No blood or discharge at the urethral meatus though dried blood noted in underwear.    MS:  Normal ROM. No bilateral lower extremity edema.  Skin:  Warm. Non-diaphoretic. No pallor.  Neuro:  Awake. A&Ox3. Normal strength.  Psych: Normal mood and affect. Normal speech.  Vitals reviewed.    Emergency Department Course     Laboratory:  Laboratory findings were communicated with the patient who voiced understanding of the findings.    CBC: HGB 13.1 (L),  (L) o/w WNL. (WBC 6.0)   BMP: AWNL (Creatinine 0.89)    UA: Red, cloudy urine. Blood urine large, protein albumin urine 70, WBC urine >182 (H), bacteria urine few, mucous urine present o/w WNL  Urine Culture Aerobic Bacterial: Pending     Emergency Department Course:  Past medical records, nursing notes, and vitals reviewed.    0815: I performed an exam of the patient as documented above.     IV was inserted and blood was drawn for laboratory testing, results above.  The patient provided a urine sample here in the emergency department. This was sent for laboratory testing, findings above.    Bladder scan revealed 400mL of urine in the bladder.     0945: Patient rechecked and updated.  Patient unable to urinate at all.     1012: 16 Sinhala Alejo catheter placed by EDT. 800mL of fluid drained into  bag.     1028: Patient rechecked and updated.  Patient is feeling much better.     1205: Patient rechecked and updated. RN to flush bladder to ensure there are no large clots.     1217: Patient's Alejo initially clotted following flush. Now, it is draining again after a large clot came out. Will irrigate once more.      Findings and plan explained to the patient. Patient discharged home with instructions regarding supportive care, medications, and reasons to return. The importance of close follow-up was reviewed    I personally reviewed the laboratory results with the patient and answered all related questions prior to discharge.      Impression & Plan     Medical Decision Making:  Nathan is a 68 year old man on Xarelto for his history of PE and currently being treated for prostate cancer who presents with gross hematuria without any other significant history although he has had some hesitancy and increased difficulty voiding.  He passed a small clot upon arrival to the emergency department and feels he has incompletely voided his bladder.  Bedside bladder scan revealed greater than 400 ccs of retained urine.  He actually had increased discomfort and was unable to urinate in the emergency department due to worsening retention and ultimately required Alejo placement.  We drained 800 ccs of hematuric urine with one large blood clot.  I reviewed a CT scan of the abdomen and pelvis from 1 month ago and he had no nephrolithiasis at that time.  I find this an unlikely cause of his hematuria.  He has no evidence of kidney injury, significant electrolyte disturbances, or leukocytosis.  Hemoglobin of 13.1 is grossly at his baseline.  Urinalysis does not reveal evidence of infection though I did send a urine culture.  It is primarily significant for hematuria alone.  There is no indication for antibiotics at this time without pyuria, leukocytosis, or fever.  Likely his hematuria is related to his prostate cancer or possibly  hemorrhagic cystitis and is exacerbated by his use of Xarelto.  After flushing the catheter and walking around in the emergency department, there are no further blood clots and the Alejo was draining appropriately.  He felt much improved and is appropriate for discharge.  He requested referral to a new urologist and I did discuss at 2 different options with him.  He agrees to call to arrange an appointment for further work-up and treatment of his hematuria and urinary retention.  He was provided with a leg bag and given education on this, though he understands he should return should it not drain as this could be related to blood clogging the tubing.  He also will return should he develop concerns such as pain or fever while awaiting urology follow up.  For now I recommended he continue his Xarelto.  Patient verbalized understanding of results, treatment plan, follow-up, and return precautions and is amenable to discharge.  All questions answered.    Discharge Diagnosis:    ICD-10-CM    1. Gross hematuria R31.0    2. Urinary retention R33.9        Disposition:  Discharged home.     Scribe Disclosure:  I, Sarah Whitehead, am serving as a scribe at 8:07 AM on 8/27/2019 to document services personally performed by Joy Matos MD based on my observations and the provider's statements to me.     Sarah Whitehead  8/27/2019   [facility] EMERGENCY DEPARTMENT       Joy Matos MD  08/28/19 9698

## 2019-08-27 NOTE — PROGRESS NOTES
Keenan Sprague is a 68 year old male who presents with catheter pain.    NURSING ASSESSMENT:  Description:  Pinching, burning pain from tip of penis where catheter was placed today in ED  Onset/duration:  An hour ago   Precip. factors:  Upon urination, bending over, with abdominal palpation   Associated symptoms:  Upon urination   Improves/worsens symptoms:  Standing or laying   Pain scale (0-10)   8/10  Last exam/Treatment:  3/19/19  Allergies:   Allergies   Allergen Reactions     Ibuprofen Hives       MEDICATIONS:   Taking medication(s) as prescribed? Yes  Taking over the counter medication(s?) No  Any medication side effects? No significant side effects    Any barriers to taking medication(s) as prescribed?  No  Medication(s) improving/managing symptoms?  No  Medication reconciliation completed: Yes      NURSING PLAN: Huddle with provider, plan includes sending patient back to ER    RECOMMENDED DISPOSITION:  To ED for evaluation,     Will comply with recommendation: Yes  If further questions/concerns or if symptoms do not improve, worsen or new symptoms develop, call your PCP or [facility] as soon as possible.      Guideline used:  Clinical judgement     Lily Reyna, RN, RN

## 2019-08-27 NOTE — TELEPHONE ENCOUNTER
Pt reports hematuria on Sunday and again this morning.  Pt states he feels the urge to urinate but only sees blood when trying to do so.  He has not seen urine since last evening.     Disposition:  ED now.  He verbalized understanding and had no further questions.     Lo Cherry RN/GINNA    Reason for Disposition    [1] Unable to urinate (or only a few drops) > 4 hours AND [2] bladder feels very full (e.g., palpable bladder or strong urge to urinate)    Additional Information    Negative: Shock suspected (e.g., cold/pale/clammy skin, too weak to stand, low BP, rapid pulse)    Negative: Sounds like a life-threatening emergency to the triager    Negative: Urinary catheter, questions about    Negative: Recent back or abdominal injury    Negative: Recent genital injury    Protocols used: URINE - BLOOD IN-A-

## 2019-08-27 NOTE — ED NOTES
Patient given written and verbal discharge instructions, answered all questions.  Demonstrated process for exchanging leg bag to regular wiley drainage bag and patient verbalized understanding.  Patient ambulatory out of department independently.

## 2019-08-27 NOTE — ED PROVIDER NOTES
History     Chief Complaint:    Dysuria     HPI   Keenan Sprague is a 68 year old male with a history of stage 4 prostate cancer and PE anticoagulated on Xarelto who presents with dysuria. The patient reports that he was evaluated this morning for hematuria of which he had a catheter placed that voided 1 L of urine. He was not having any time at discharge and since then has emptied the leg bag 3 times. The patient states that he has since however started to develop dysuria and abdominal pain radiating into his back especially when he has to void. He describes that the tip of his penis is the most painful. Today he has worsening leg swelling compared to his baseline. The patient denies fever, chills, nausea, vomiting, history of similar pain or having a catheter in the past. The patient has an appointment with urology in 1 week and the pain is causing him difficulty in performing his job. The patient details that he has already had radiation for his cancer and will not be having more. He has a surgical provoked bilateral PE a few months prior, for which he takes Xarelto, and he has yet to take his dose today, however has not missed any dosages in the past.     Allergies:  Ibuprofen; hives      Medications:    Compazine  Prednisone   Xarelto     Past Medical History:    Embolism and thrombosis  Lipomatosis  Prostate cancer   Anxiety   Obesity   JACKI  bilateral PE     Past Surgical History:    nonspecific procedure bone graft  Insert radiation seeds prostate  Open reduction internal fixation clavicle  Open reduction internal fixation wrist  Tonsillectomy     Family History:    Father: cancer     Social History:  The patient was accompanied to the ED by wife.  Smoking Status: Former Smoker  Smokeless Tobacco: Never Used  Alcohol Use: Positive  Drug Use: Negative  PCP: Jono Huber   Marital Status:        Review of Systems   Constitutional: Negative for chills and fever.   Gastrointestinal: Positive for  abdominal pain. Negative for nausea and vomiting.   Genitourinary: Positive for dysuria and hematuria.   Musculoskeletal: Positive for back pain.   All other systems reviewed and are negative.    Physical Exam     Patient Vitals for the past 24 hrs:   BP Temp Temp src Pulse Heart Rate Resp SpO2   08/27/19 2049 -- -- -- -- -- -- 98 %   08/27/19 2048 (!) 154/81 -- -- 75 -- -- --   08/27/19 1709 (!) 158/97 98  F (36.7  C) Oral -- 72 20 100 %      Physical Exam  General: Alert and interactive. Appears well. Cooperative and pleasant.   Eyes: The pupils are equal and round. EOMs intact. No scleral icterus.  ENT: No abnormalities to the external nose or ears. Mucous membranes moist. Posterior oropharynx is non-erythematous.      Neck: Trachea is in the midline. No nuchal rigidity.     CV: Regular rate and rhythm. S1 and S2 normal without murmur, click, gallop or rub.   Resp: Breath sounds are clear bilaterally, without rhonchi, wheezes, rales. Non-labored, no retractions or accessory muscle use.     GI: Abdomen is soft without distension. Mild diffuse tenderness to palpation in bilateral flanks and lower abdomen. No peritoneal signs.   : Alejo catheter in place draining hematuric urine with small clots. No large clots or gross hematuria. No palpable inguinal hernias.   MS: Moving all extremities well. Good muscle tone.   Skin: Warm and dry. No rash or lesions noted.  Neuro: Alert and oriented x 3. No focal neurologic deficits. Good strength and sensation in upper and lower extremities.    Psych: Awake. Alert.  Normal affect. Appropriate interactions.  Lymph: No anterior or posterior cervical lymphadenopathy noted.    Emergency Department Course     Imaging:  Radiology findings were communicated with the patient who voiced understanding of the findings.    Abd/pelvis CT no contrast - Stone Protocol  1.  No acute abnormality in the abdomen or pelvis.  2.  The small right inguinal hernia contains a short segment of  nonobstructed small bowel.  3.  Severe sigmoid diverticulosis.  Reading per radiology      Laboratory:  Laboratory findings were communicated with the patient who voiced understanding of the findings.    UA: Unable to obtain due to interfering substance.    CBC: WBC 7.5, HGB 13.2 (L),   BMP: Glucose 107 (H), o/w WNL (Creatinine 1.02)    Hepatic Panel: WNL  Nt probnp inpatient: 413    Interventions:  Medications   phenazopyridine (PYRIDIUM) tablet 200 mg (200 mg Oral Given 8/27/19 1818)   HYDROmorphone (PF) (DILAUDID) injection 0.5 mg (0.5 mg Intravenous Given 8/27/19 1908)   lidocaine (XYLOCAINE) 2 % external gel (5 mLs  Given 8/27/19 1909)      Emergency Department Course:    1748 IV was inserted and blood was drawn for laboratory testing, results above.     1801 Nursing notes and vitals reviewed. I performed an exam of the patient as documented above.     1830 The patient was sent for a CT while in the emergency department, results above.      1937 Patient rechecked and updated.      1956 A catheter urine sample was obtained. This was sent to laboratory for testing, results above.      2030 Prior to discharge, I personally reviewed the results with the patient and all related questions were answered. The patient verbalized understanding and is amenable to plan.     Impression & Plan      Medical Decision Making:  Keenan Sprague is a 68 year old male who presents to the emergency department today for evaluation of hematuria and bladder spasms. The patient had a Alejo catheter placed this morning in the Emergency Department and was doing well prior to discharge. He had no gross hematuria or large clots at that time. Upon discharge, the patient began to develop dysuria and abdominal pain, as well as discomfort at the tip of his penis when voiding. On evaluation, he has no significant abdominal tenderness or abnormalities to the tip of his penis, including balanitis. There is no abnormal discharge from the  penis or abnormal drainage surrounding the Alejo. The Alejo catheter is draining slightly pink urine without any large clots, and this was irrigated, clearing a significant amount of the hematuria. The patient is frustrated, as he called to follow up with urology today and the next available appointment is at least a week out.     Laboratory results are reassuring here, without any sign of significant renal dysfunction or anemia. His urine from earlier shows no signs of infection, and his urine tonight is contaminated by an initial dose of Pyridium. Placed Urojet around his urinary catheter at the tip of the penis, and he felt much better. He has no significant abdominal pain, but to rule out renal pathology I did order a CT scan of the abdomen and pelvis, and there is no significant renal mass or signs of ureteral stone. Patient does have a small inguinal hernia with bowel present, but no signs of strangulation or bowel obstruction. I was unable to palpate the inguinal hernia on examination today. I will give him information for our surgical consultants for possible elective repair of the inguinal hernia. He is still passing stool and has no significant abdominal pain to suggest bowel obstruction, and there is no indication for emergent surgical referral at this point.    Patient felt better here after interventions, as noted above. His urine from earlier will be cultured. There is no indication at this point start him on antibiotics, as he has hematuria in his urine but no signs of pyuria. We will start him on Oxybutynin, Pyridium, and give him some Urojet to go home with. I stressed the importance of follow-up with urology, and have encouraged him to call again to see if he can get a sooner appointment. If he continues to have abdominal discomfort, significant chest pain or shortness of breath, high fevers, or any other worrisome symptoms, he should return here immediately.     Diagnosis:    ICD-10-CM    1.  Dysuria R30.0 UA with Microscopic   2. History of prostate cancer Z85.46    3. Alejo catheter in place Z96.0    4. Unilateral inguinal hernia without obstruction or gangrene, recurrence not specified K40.90      Disposition:   The patient is discharged to home.     Discharge Medications:  New Prescriptions    LIDOCAINE (XYLOCAINE) 2 % EXTERNAL GEL    Place into the urethra daily    OXYBUTYNIN (DITROPAN) 5 MG TABLET    Take 1 tablet (5 mg) by mouth 2 times daily for 5 days    PHENAZOPYRIDINE (PYRIDIUM) 100 MG TABLET    Take 1 tablet (100 mg) by mouth 3 times daily for 3 days     Scribe Disclosure:  I, Orla Severson, am serving as a scribe at 6:05 PM on 8/27/2019 to document services personally performed by Bethanie Haider PA-C based on my observations and the provider's statements to me.   [facility] EMERGENCY DEPARTMENT       Bethanie Haider PA-C  08/27/19 [id number]

## 2019-08-27 NOTE — ED TRIAGE NOTES
Pt presents with c/o having hematuria X2 days. Also reports trouble urinating mid-stream. Pt has stage 4 prostate CA. Pt is A&O, ABC's intact.

## 2019-08-27 NOTE — ED NOTES
Alejo catheter flushed with 150 ml of sterile saline.  Initially did not have any returns however catheter was repositioned with an immediate return of a large blood blot followed by light pink drainage.  Alejo then flushed with an additional 200 ml of sterile saline with immediate returns of clear fluid, no clots noted this time.  Patient tolerated procedure well.

## 2019-08-27 NOTE — DISCHARGE INSTRUCTIONS
Call urology to arrange first available follow-up appointment. I have provided you 2 options.  Return immediately if the catheter is not functioning as this can be due to blood clogging of the tubing.  You should also return for severe pain or development of fever greater than 101  F.  For now, continue Xarelto.

## 2019-08-27 NOTE — PROGRESS NOTES
TC from pt stating that he was in the ED with blood and clots in his urine today and that he had an indwelling catheter placed with a leg bag. He stated it is very uncomfortable and is needing help managing it. He feels like he was discharged without enough teaching and information to function adequately at home. He was given discharge instructions to follow-up with his urologist within three days of discharge. Pt stated he has a urologist, but would like to transfer care to the [facility]. He is requesting an appt be set up asap to help him with his catheter.     Told writer that a message will be sent to our scheduling team to request the next available new pt appt with urology but that since he is a new pt, it will be very unlikely that he will get in over the next three days here and that he should call is current urologist to see if they can get him in per the ED's advice. Told pt that some urology clinics even have nurse-only visits for this type of situation. Pt stated understanding and stated he will call his own urologist. Writer also told pt that if his urologist can't help him that he could also try getting in with his PCP as it's possible they could help. Pt stated understanding.     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 He believes this would not be possible with a leg bag. Writer agreed with pt and offered to provide a letter but pt declined stating that he doesn't want his employer to know that he has cancer and that they could find out who the signing provider is if they wanted, even if the letter were written on generic [facility] letterhead. Pt stated he will follow-up with his urologist or PCP to obtain a letter.     Told pt to expect a call from one of our scheduling team members regarding his urology appt, and to call the clinic with any further questions or concerns. Pt stated  understanding of all.

## 2019-08-28 ENCOUNTER — TELEPHONE (OUTPATIENT)
Dept: ONCOLOGY | Facility: CLINIC | Age: 69
End: 2019-08-28

## 2019-08-28 DIAGNOSIS — C61 MALIGNANT NEOPLASM OF PROSTATE (H): Primary | ICD-10-CM

## 2019-08-28 LAB
BACTERIA SPEC CULT: NORMAL
SPECIMEN SOURCE: NORMAL

## 2019-08-28 RX ORDER — ABIRATERONE ACETATE 250 MG/1
1000 TABLET ORAL DAILY
Qty: 120 TABLET | Refills: 0 | Status: SHIPPED | OUTPATIENT
Start: 2019-08-28 | End: 2020-02-20

## 2019-08-28 NOTE — ED NOTES
Per patient, pain had improved following the pyridium and dilaudid administration. However, patient just had another urge to void and burning pain returned. ESTHELA Kovacs updated.

## 2019-08-28 NOTE — TELEPHONE ENCOUNTER
Writer received call from patient who was asking to speak to Dr Davis. When probed on reason for call, patient became very frustrated asking why he can't just talk to Dr Davis. Writer attempted to explain that we don't have a direct line for clinic providers and a message needed to be sent with the nature of his call. Patient went on to state that he was in the ER for dysuria, currently has a catheter in place, and has continued symptoms. Patient scheduled with a urology clinic for next Wednesday, September 4 but this is not soon enough, per patient. Writer sent urgent IB to Dr Jerardo Davis, per patient request.

## 2019-08-28 NOTE — DISCHARGE INSTRUCTIONS
Call primary care and urology and see if you can get in sooner.   You can try Oxybutynin, Urojet (lidocaine), and Pyridium for bladder spasms.   Your urine looks clear of infection, but will await culture.   Call surgical consultants for inguinal hernia repair consultation.   Return here if pain is out of control or if your catheter is blocked by blood clots.

## 2019-08-29 ENCOUNTER — TELEPHONE (OUTPATIENT)
Dept: ONCOLOGY | Facility: CLINIC | Age: 69
End: 2019-08-29

## 2019-08-29 NOTE — ORAL ONC MGMT
Oral Chemotherapy Monitoring Program     Primary Oncologist: Dr. Davis  Primary Oncology Clinic: [facility]  Cancer Diagnosis: Prostate Cancer     Therapy History:  Zytiga (abiraterone) 1000mg ([id number]) daily, continuously  Started 4/9/19, C2: 5/9, C3: 6/8, C4: 7/8 C5: 8/7;   Next cycles: C6: 9/6; C7: 10/6; C8: 11/5     Drug Interaction Assessment: No new drug interactions identified. Drugs checked 8/28- pyridium and oxybutynin.      Lab Monitoring Plan  CMP & CBC monthly     Subjective/Objective:  Keenan Sprague is a 68 year old male contacted by phone for a follow-up visit for oral chemotherapy. He confirmed correct dosing of 4 x 250mg tablets (1000mg) taken once daily on an empty stomach. He also confirms taking prednisone and Xarelto once daily with breakfast. He reports having hot flashes that are self revolving and he has discussed with Dr. Davis in the past. Hot flashes are not too bothersome at this time. He denies other side effects including edema, nausea, diarrhea. He denies missed doses and confirmed next cycle starts on 9/6. He was at ED this week for dysuria and was started on pyridium and oxybutynin. No drug interactions with abiraterone. He will follow up with urology.     ORAL CHEMOTHERAPY 4/3/2019 4/15/2019 5/15/2019 7/30/2019 8/29/2019   Drug Name Zytiga (Abiraterone) Zytiga (Abiraterone) Zytiga (Abiraterone) Zytiga (Abiraterone) Zytiga (Abiraterone)   Current Dosage 1000mg 1000mg 1000mg 1000mg 1000mg   Current Schedule Daily Daily Daily Daily Daily   Cycle Details Continuous Continuous Continuous Continuous Continuous   Start Date of Last Cycle - 4/9/2019 - 7/8/2019 8/7/2019   Planned next cycle start date - - - 8/7/2019 9/6/2019   Doses missed in last 2 weeks - - 0 0 0   Adherence Assessment - Adherent Adherent Adherent Adherent   Adverse Effects - No AE identified during assessment No AE identified during assessment Other (see note for details) Other (see note for details)   Other (see  "note for details) - - - hot flashes hot flashes   Pharmacist intervention? - - - Yes No   Intervention(s) - - - Referral to oncology provider -   Any new drug interactions? No No No No No   Is the dose as ordered appropriate for the patient? Yes Yes - Yes Yes   Has the patient been assessed within the past 6 months for depression? - - - Yes Yes   Has the patient missed any days of school, work, or other routine activity? - - - No No       Last PHQ-2 Score on record:   PHQ-2 ( 1999 [employer]) 3/5/2019 12/19/2018   Q1: Little interest or pleasure in doing things 0 0   Q2: Feeling down, depressed or hopeless 0 0   PHQ-2 Score 0 0       Patient does not report depression symptoms.      Vitals:  BP:   BP Readings from Last 1 Encounters:   08/27/19 (!) 154/81     Wt Readings from Last 1 Encounters:   08/14/19 130.7 kg (288 lb 3.2 oz)     Estimated body surface area is 2.61 meters squared as calculated from the following:    Height as of 7/17/19: 1.88 m (6' 2\").    Weight as of 8/14/19: 130.7 kg (288 lb 3.2 oz).    Labs:  _  Result Component Current Result Ref Range   Sodium 140 (8/27/2019) 133 - 144 mmol/L     _  Result Component Current Result Ref Range   Potassium 3.7 (8/27/2019) 3.4 - 5.3 mmol/L     _  Result Component Current Result Ref Range   Calcium 8.9 (8/27/2019) 8.5 - 10.1 mg/dL     No results found for Mag within last 30 days.     No results found for Phos within last 30 days.     _  Result Component Current Result Ref Range   Albumin 3.6 (8/27/2019) 3.4 - 5.0 g/dL     _  Result Component Current Result Ref Range   Urea Nitrogen 16 (8/27/2019) 7 - 30 mg/dL     _  Result Component Current Result Ref Range   Creatinine 1.02 (8/27/2019) 0.66 - 1.25 mg/dL       _  Result Component Current Result Ref Range   AST 23 (8/27/2019) 0 - 45 U/L     _  Result Component Current Result Ref Range   ALT 31 (8/27/2019) 0 - 70 U/L     _  Result Component Current Result Ref Range   Bilirubin Total 0.5 (8/27/2019) 0.2 - 1.3 mg/dL "       _  Result Component Current Result Ref Range   WBC 7.5 (8/27/2019) 4.0 - 11.0 10e9/L     _  Result Component Current Result Ref Range   Hemoglobin 13.2 (L) (8/27/2019) 13.3 - 17.7 g/dL     _  Result Component Current Result Ref Range   Platelet Count 154 (8/27/2019) 150 - 450 10e9/L     _  Result Component Current Result Ref Range   Absolute Neutrophil 5.8 (8/27/2019) 1.6 - 8.3 10e9/L     Assessment:  Nathan is tolerating abiraterone with the exception of hot flashes. He has discussed with Dr. Davis in the past and not bothersome enough for medication. No concerns with adherence. He will have urology follow-up after ED visit this week for dysuria. Started oxybutynin and pyridium. No drug interactions with new medications     Plan:  Continue abiraterone + prednisone. Cycle 6 start 9/6.   Set up proactive refill request in September for Xarelto and keep all refills aligned at [facility] for [facility] retail pick-up.      Follow-Up:  Monthly follow up week of 9/23 for assessment and refill.   Next Dr. Davis appointment planned for October, not scheduled yet.     Refill Due:  Pick-up-at-Retail set up for abiraterone/prednisone/Xarelto at [facility] ([address]) on 8/30.       Thank you for the opportunity to participate in the care of the above patient,  Zhen Castillo, PharmD  Hematology/Oncology Clinical Pharmacist  [facility]  [facility]  [phone]

## 2019-08-30 ENCOUNTER — HOSPITAL ENCOUNTER (EMERGENCY)
Facility: CLINIC | Age: 69
Discharge: HOME | End: 2019-08-30
Attending: EMERGENCY MEDICINE | Admitting: EMERGENCY MEDICINE
Payer: COMMERCIAL

## 2019-08-30 ENCOUNTER — TELEPHONE (OUTPATIENT)
Dept: INTERNAL MEDICINE | Facility: CLINIC | Age: 69
End: 2019-08-30

## 2019-08-30 VITALS
HEART RATE: 57 BPM | OXYGEN SATURATION: 100 % | WEIGHT: 280 LBS | SYSTOLIC BLOOD PRESSURE: 162 MMHG | DIASTOLIC BLOOD PRESSURE: 116 MMHG | TEMPERATURE: 97.6 F | RESPIRATION RATE: 18 BRPM | HEIGHT: 74 IN | BODY MASS INDEX: 35.94 KG/M2

## 2019-08-30 DIAGNOSIS — Z87.448 HISTORY OF URINARY TRACT OBSTRUCTION: ICD-10-CM

## 2019-08-30 DIAGNOSIS — Z46.6 ENCOUNTER FOR FOLEY CATHETER REMOVAL: ICD-10-CM

## 2019-08-30 DIAGNOSIS — C61 PROSTATE CANCER (H): ICD-10-CM

## 2019-08-30 PROCEDURE — 99282 EMERGENCY DEPT VISIT SF MDM: CPT

## 2019-08-30 ASSESSMENT — ENCOUNTER SYMPTOMS
LIGHT-HEADEDNESS: 0
SHORTNESS OF BREATH: 0
HEMATURIA: 0
DYSURIA: 0

## 2019-08-30 ASSESSMENT — MIFFLIN-ST. JEOR: SCORE: 2119.82

## 2019-08-30 NOTE — DISCHARGE INSTRUCTIONS
You should continue to follow-up with urologist as previously planned.  You should continue to touch base with your hematologist about reinitiation of your Xarelto.  You should return the emergency department if you have issues peeing, pressure above your pubic bone, back pain or fevers, or blood coming from your penis.

## 2019-08-30 NOTE — LETTER
[place] [address]  [facility] [address]  [phone]              August 27, 2019     RE:  Keenan Sprague                                                                                                                                                       [id number] [facility] [address]                 To whom it may concern:     Keenan Sprague is under my professional care.   He was seen in the [facility] ER 8/27/19.   He needs assessment from urology and is unable to work until 9/2/2019  Patient can return to work on 9/3/2019.         Sincerely,           Jono Huber MD

## 2019-08-30 NOTE — ED TRIAGE NOTES
ABCs intact. Pt was seen in the ER 8/27/19 for dysuria. Pt had a wiley catheter placed. Pt is here for catheter removal.

## 2019-08-30 NOTE — TELEPHONE ENCOUNTER
See message below. Needs new letter to return to work Tuesday, 9/3.     Scheduled to see Urology on 9/9 and 9/17.     Cath removed today in ED.

## 2019-08-30 NOTE — ED PROVIDER NOTES
"  History     Chief Complaint:  Catheter Removal      HPI   Keenan Sprague is a 68 year old male, currently on oral chemotherapy (Zytiga) for stage 4 prostate cancer, anticoagulated on Xarelto for PE, who presents with wishes to have his catheter removed. He presented on 8/27/19 with hematuria and urinary retention. He had a wiley placed, some clots passed and he has now had the catheter in for a few days with no more blood. He did stop his Xarelto yesterday as ordered by his physician. He states he has been compliant with all medical orders. He states the catheter is quite bothersome and painful. He has a urology appointment on 9/4/19. He denies urinary symptoms, shortness of breath, chest pain, swelling and lightheadedness.     Allergies:  Ibuprofen; hives       Medications:    Compazine  Prednisone   Xarelto   Zytiga     Past Medical History:    Embolism and thrombosis  Lipomatosis  Prostate cancer   Anxiety   Obesity   JACKI  bilateral PE      Past Surgical History:    nonspecific procedure bone graft  Insert radiation seeds prostate  Open reduction internal fixation clavicle  Open reduction internal fixation wrist  Tonsillectomy      Family History:    Father: cancer      Social History:  The patient was accompanied to the ED by wife.  Smoking Status: Former Smoker  Smokeless Tobacco: Never Used  Alcohol Use: Positive  Drug Use: Negative  PCP: Jono Huber   Marital Status:        Review of Systems   Respiratory: Negative for shortness of breath.    Cardiovascular: Negative for chest pain.   Genitourinary: Negative for dysuria and hematuria.   Neurological: Negative for light-headedness.   All other systems reviewed and are negative.      Physical Exam   First Vitals:  BP: (!) 170/102  Pulse: 57  Temp: 97.6  F (36.4  C)  Resp: 18  Height: 188 cm (6' 2\")  Weight: 127 kg (280 lb)  SpO2: 100 %      Physical Exam  Constitutional: Alert, attentive, GCS 15  HENT:    Nose: Nose normal.    Mouth/Throat: " Oropharynx is clear, mucous membranes are moist.   Eyes: EOM are normal, anicteric, conjugate gaze  CV: regular rate and rhythm; no murmurs  Chest: Effort normal and breath sounds clear without wheezing or rales, symmetric bilaterally   GI:  non tender. No distension. No guarding or rebound.    : Alejo in placed, draning arnaldo-colored urine. No clots . No ulceration of meatus.   MSK: No LE edema, no tenderness to palpation of BLE.  Neurological: Alert, attentive, moving all extremities equally.   Skin: Skin is warm and dry.    Emergency Department Course     Emergency Department Course:  The patient arrived in triage where vitals were measured and recorded.   The patient was then escorted back to the emergency department.   The patient's medical records were reviewed.  Nursing notes and vitals were reviewed.  0947: I performed an exam of the patient as documented above.  The above workup was undertaken.  1057: I rechecked the patient and discussed results.    Findings and plan explained to the Patient. Patient discharged home, status improved, with instructions regarding supportive care, medications, and reasons to return as well as the importance of close follow-up was reviewed.     Impression & Plan    Medical Decision Makin-year-old male with past medical history significant for stage IV prostate cancer currently undergoing evaluation at the [facility], history of PE 7 months prior in the setting of rotator cuff surgery previously on Xarelto presenting for evaluation of discomfort from his Alejo catheter.  Catheter was placed several days prior after presenting with hematuria and urinary obstruction.  Since that time, he has spoken to his hematologist to his recommended discontinuation of his Xarelto for now while undergoing evaluation for his hematuria.  Today he presents with clear arnaldo urine and a desire to have his Alejo removed.  He is due to see urology in 4 days.  I did discuss with him  the risks and benefits of Alejo removal and he agrees to have it taken out knowing that he could re-obstruct and need to have Alejo laced again as well as being made aware of trauma associated with Alejo catheter insertion.  After removal, patient was able to hydrate here in past 2 voids without feelings of urinary retention.  He was felt safe for discharge home with plan for follow-up with urology as previously discussed and with his hematologist to discuss reinitiation of Xarelto.  Return precautions were reviewed.    Diagnosis:    ICD-10-CM    1. Encounter for Alejo catheter removal Z46.6    2. Prostate cancer (H) C61    3. History of urinary tract obstruction Z87.448        Disposition:  Discharged to home.     Adam Ramon MD, MD   [employer]  11:07 AM 08/30/19       Chyna ZAVALA, loni serving as a scribe on 8/30/2019 at 9:47 AM to personally document services performed by Dr. Ramon based on my observations and the provider's statements to me.   [facility] EMERGENCY DEPARTMENT       Adam Ramon MD  08/30/19 1108

## 2019-08-30 NOTE — TELEPHONE ENCOUNTER
MEDICAL RECORDS REQUEST   [facility]  Urology Clinic  [address] [place] [address]  PHONE: [phone]  Fax: [phone]        FUTURE VISIT INFORMATION                                                   Keenan Sprague, : 1950 scheduled for future visit at [facility]    APPOINTMENT INFORMATION:    Date: 19 8:30AM     Provider:  Bernabe Arita MD     Reason for Visit/Diagnosis: Blood in urine     REFERRAL INFORMATION:    Referring provider:  Jerardo Davis     Specialty: MD     Referring providers clinic:  [facility]     Clinic contact number:  [phone]    RECORDS REQUESTED FOR VISIT                                                     NOTES  STATUS/DETAILS   OFFICE NOTE from referring provider  yes   OFFICE NOTE from other specialist  no   DISCHARGE SUMMARY from hospital  yes   DISCHARGE REPORT from the ER  yes   OPERATIVE REPORT  no   MEDICATION LIST  yes   LABS     URINALYSIS (UA)  yes     PRE-VISIT CHECKLIST      Record collection complete Yes- All recs in epic    Appointment appropriately scheduled           (right time/right provider) Yes   MyChart activation Yes   Questionnaire complete If no, please explain: In process      Completed by: Sowmya Marsh

## 2019-08-30 NOTE — ED NOTES
"Patient reports feeling \"much better\" after wiley removal. Patient noted to have elevated blood pressure. MD in to see patient and discuss diagnosis, test results, and discharge plan. Patient meets discharge criteria. Discussed AVS with patient. Patient reports intention to follow up with urologist and primary care provider. Questions answered. Patient verbalized understanding. Patient reports being ready to go home. Patient discharged home by car with all necessary information.    "

## 2019-08-30 NOTE — ED AVS SNAPSHOT
[facility] Emergency Department  [address]  [facility] [address]  Phone:  [phone]  Fax:  [phone]                                    Keenan Sprague   MRN: [id number]    Department:  [facility] Emergency Department   Date of Visit:  8/30/2019           After Visit Summary Signature Page    I have received my discharge instructions, and my questions have been answered. I have discussed any challenges I see with this plan with the nurse or doctor.    ..........................................................................................................................................  Patient/Patient Representative Signature      ..........................................................................................................................................  Patient Representative Print Name and Relationship to Patient    ..................................................               ................................................  Date                                   Time    ..........................................................................................................................................  Reviewed by Signature/Title    ...................................................              ..............................................  Date                                               Time          22EPIC Rev 08/18

## 2019-08-30 NOTE — TELEPHONE ENCOUNTER
Reason for Call:  Other Return to work letter     Detailed comments: Patient would like a new return to work letter stating he can return to work on Tuesday as the catheter was removed. If he has no issues over this weekend he would like to go back Tuesday. States would like to pick it up today.     Phone Number Patient can be reached at: Cell number on file:    Telephone Information:   Mobile [phone]       Best Time: any    Can we leave a detailed message on this number? YES    Call taken on 8/30/2019 at 1:58 PM by Velvet Lopez

## 2019-08-30 NOTE — TELEPHONE ENCOUNTER
Letter is ready to return to work 9/3/19  In the future he should ask for work excuse letters from the physician who has seen him for the problems.

## 2019-09-03 ENCOUNTER — PRE VISIT (OUTPATIENT)
Dept: UROLOGY | Facility: CLINIC | Age: 69
End: 2019-09-03

## 2019-09-03 NOTE — TELEPHONE ENCOUNTER
Reason for Visit: Consult    Diagnosis: Hematuria    Orders/Procedures/Records: Records available    Contact Patient: N/A    Rooming Requirements: Normal      Trinity Menendez  09/03/19  7:41 AM

## 2019-09-06 NOTE — PROGRESS NOTES
Urology Clinic    Bernabe Arita MD  Date of Service: 2019     Name: Keenan Sprague  MRN: [id number]  Age: 68 year old  : 1950  Referring provider: Referred Self     Assessment:  History of prostate cancer   Keenan Sprague  is a 68 year old male with a history of GS= 3+4 prostate cancer. He is s/p hormonal therapy and brachytherapy in combination with external beam radiation therapy in . PSA remained undetectable until 2008 when it was 0.17 but continued to remain stable until it was measured at 17.80 in 2019 and 21.50 in 2019. He started Lupron 22.5mg every 3 months in 2019. He started Abiraterone 1000mg daily with Prednisone 5 mg every day per [facility] regimen 2019.     History of bilateral DVTs 2019-2019  He was started on Xarelto while in the ED in 2019 and recovered well. Although, he was seen in the ED in 2019 again passing clots in his urine. Discussed that gross hematuria was most likely related to his prostate cancer or hemorrhagic cystitis from the Xarelto. At that time he was told to stop Xarelto. Now his hematuria and shortness of breath has resolved. I prefer him to remain off anticoagulation if possible.  The existing DVTs should have resolve by now.     Plan:  -Encouraged him to follow up with PCP for lower extremity US to check for DVT  -He will schedule a cystoscopy for Thursday end of day. Not ready to have one completed today. (Still traumatized from wiley last month removed about a week ago). But need to rule out the small but measurable risk of bladder malignancy as the cause of the heamaturia    Attestation:  This patient was seen and evaluated by me, with a scribe taking notes.  I have reviewed the note above and agree.  The physical exam and or any procedures were performed by me and the pertinant details are outlined below.       Bernabe Arita MD  Department of Urology  [facility]  [place]    ---------------------------------------------------------------------------------------------------------------------    Chief Complaint:   Chief Complaint   Patient presents with     Consult     Stage 4 prostate patient. Hematuria first seen a few weeks ago.        HPI:   Keenan Sprague  is a 68 year old male with a history of GS= 3+4 prostate cancer. He is s/p hormonal therapy and brachytherapy in combination with external beam radiation therapy in 2007. PSA remained undetectable until 11/2008 when it was 0.17. It remained relatively stable until it elevated to around 0.27 in 2015, 0.82 in 2017 and significantly to 17.80 in 02/2019, 23.3 in 03/2019 and 21.50 in 04/2019. He started Lupron 22.5mg every 3 months in 04/2019. He started Abiraterone 1000mg daily with Prednisone 5 mg every day per [facility] regimen 04/09/2019. He had CT CAP and NM bone scan revealing stable disease with no evidence of disease progression in 07/2019. He has a history of smoking 1/4 pack per day for 35 years but is not currently smoking (1/2 year).     Initial PSA at diagnosis: 16.3   1st Biopsy (Date 12/2006) Hartsel Score was 3 + 4 in the right mid/median prostate affecting 40% of the tissue, right apex affecting 50% of the tissue, right mid/lateral prostate affecting 30% of the tissue and right apex/lateral affecting 30% of the tissue and Hartsel Score was 3 + 3  In the right base affecting 90% of the tissue.       Nathan was admitted at [facility] between 02/22/2019-02/26/2019 for acute hypoxemic respiratory failure because of bilateral pulmonary emboli.  He was on systemic anticoagulation and recovered quite well.      Though, 08/27, he presented to the ED with gross hematuria (passing blood clots). He was found to have 400cc of residual volume and required a wiley catheter as he was unable to void on his own. When wiley was placed, 800cc of hematuria was drained with one large blood clot. ED determined  hematuria was most likely related to his prostate cancer or hemorrhagic cystitis (on Xarelto). He was discharged as hematuria had resolved and wiley was draining well. However, later the same evening, he presented to the ED again but for dysuria, abdominal pain and discomfort at the tip of his penis with voiding. Physical exam, imaging and labs were all unremarkable. He was sent home with Oxybutynin, Pyridium, and Urojet to go home with. 08/30 he was seen in the ED again for evaluation of wiley. He presented with clear arnaldo urine and a desired to have his Wiley removed.     He has also recovered from the right shoulder arthroplasty performed in early 01/2019 and is now back at work full-time.     Denies gross hematuria or shortness of breath since being off the Xarelto.     Review of Systems:   Pertinent items are noted in HPI or as below, remainder of complete ROS is negative.      Active Medications:      abiraterone (ZYTIGA) 250 MG tablet, Take 4 tablets (1,000 mg) by mouth daily for 30 doses Take on empty stomach., Disp: 120 tablet, Rfl: 0     lidocaine (XYLOCAINE) 2 % external gel, Place into the urethra daily (Patient not taking: Reported on 9/9/2019), Disp: 30 mL, Rfl: 0     prochlorperazine (COMPAZINE) 10 MG tablet, Take 0.5 tablets (5 mg) by mouth every 6 hours as needed (Nausea/Vomiting) (Patient not taking: Reported on 5/29/2019), Disp: 30 tablet, Rfl: 2     rivaroxaban ANTICOAGULANT (XARELTO) 20 MG TABS tablet, Take 1 tablet (20 mg) by mouth daily (with dinner) (Patient not taking: Reported on 9/9/2019), Disp: 30 tablet, Rfl: 5      Allergies:   Ibuprofen      Past Medical History:  Prostate cancer   Obstructive sleep apnea   Anxiety   Bilateral pulmonary embolism   Obesity      Past Surgical History:  Bone graft for left hand fracture- 1972   Seed implant for prostate-2007   Tonsillectomy     Family History:   No pertinent family history       Social History:   The patient was alone   Smoking Status:  "former; 1/4 pack per day for 35 years; 1/2 year since quitting   Smokeless Tobacco: never    Alcohol Use: yes; 2-4 drinks per month    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      Physical Exam:   /75   Pulse 62   Ht 1.88 m (6' 2\")   Wt 127 kg (280 lb)   BMI 35.95 kg/m     Body mass index is 35.95 kg/m .  General: Alert, no acute distress, oriented  HENT: Good dentition  Neuro: Alert, oriented, speech and mentation normal  Psych: Affect and mood normal  Gait: Normal    Imaging:   I personally reviewed all applicable imaging and went over the below findings with patient.    CT of the abdomen and pelvis with contrast 3/6/2019 11:33 AM  Abdomen/pelvis:  Diffuse low-density hepatic parenchyma no focal hepatic mass. Spleen,  pancreas and adrenal glands are unremarkable. No hydronephrosis or  large renal stone. Large and small bowel are nondilated without  evidence of obstruction. Brachytherapy seeds are present in the  prostate. The bladder is prominently decompressed. The gallbladder is  nondilated without wall thickening or pericholecystic abnormality.                                                                 IMPRESSION:  1.  Retroperitoneal lymphadenopathy. Largest periaortic lymph node  measures 17 mm in short axis. Additional left retroperitoneal lymph  node measures 2.1 cm in short axis.  2.  Diverticulosis.  3.  Hepatic steatosis.  GEN GUNTER MD    NUCLEAR MEDICINE WHOLE BODY BONE SCAN  3/6/2019 2:34 PM  FINDINGS: Prominent area of abnormal uptake involving the right  humeral head and adjacent acromion. This could be degenerative in  nature or related to metastatic disease. Recommend MRI correlation.  There were no abnormalities here on a 6/29/2017 x-ray.  Degenerative uptake in the right posterior cervical spine.  Degenerative uptake in the left AC joint. Otherwise unremarkable.   IMPRESSION:   1. There is a new area of increased uptake in the right acromion " and  right humeral head. Given this distribution, it is likely degenerative  in nature. Recommend MRI for further evaluation to exclude metastatic  disease.  2. Degenerative uptake in the right posterior cervical spine.  3. Otherwise no suspicious areas of uptake.  BRAYAN ALEXANDER MD    US UPPER EXTREMITY VENOUS DUPLEX RIGHT 3/13/2019 4:45 PM  CLINICAL HISTORY/INDICATION:  New acute PE; had h/o right shoulder  arthroplasty; eval for residual RUE clot.; Malignant neoplasm of  prostate (H)  FINDINGS:   The right jugular vein demonstrates normal compressibility, color-flow  and spectral waveform. The right subclavian vein, axillary vein, cephalic vein, brachial vein  and basilic vein demonstrate normal compressibility, spectral  waveform, color flow and augmentation.  IMPRESSION:   No evidence of deep venous thrombosis in the right upper extremity  HECTOR GONZALEZ DO    EXAMINATION: CT CHEST/ABDOMEN/PELVIS W CONTRAST, 7/24/2019 1:32 PM  Abdomen and pelvis: No focal hepatic lesion or intrahepatic biliary  dilatation. The gallbladder, pancreas, spleen, adrenal glands, and  kidneys are unremarkable. The urinary bladder is grossly unremarkable.  Brachytherapy seeds present within the prostate. Colonic  diverticulosis without evidence of diverticulitis. No dilated loops of  bowel or bowel wall thickening. Normal nondilated appendix. Pelvic  phleboliths. No free air free fluid. A few prominent iliac chain lymph  nodes, none of which are enlarged. Multiple prominent mesenteric. The  previously enlarged left periaortic lymph node now measures 9 mm  (series 3, image 364). Stable left periaortic enlarged lymph node  measures 2.1 x 1.9 cm (series 8, image 412). However, the overall  appearance of the retroperitoneal lymph nodes appear decreased in size  from prior. Major intra-abdominal vessels are patent. Fusiform  infrarenal abdominal aortic aneurysm measuring 3.5 x 3.3 cm in  greatest dimensions, unchanged. Calcified and  noncalcified and  calcified of the abdominal aorta and iliac vessels. Aneurysmal  dilation of the left common iliac artery measuring 2.0 cm. Aneurysmal  dilatation of the right, no acute artery measuring 2.3 cm.   IMPRESSION: In this patient with history of metastatic prostate  cancer:  1. Stable left periaortic lymph node measuring 2.1 cm. Additional  retroperitoneal lymph nodes have decreased in size from the prior  exam.  2. Fusiform infrarenal abdominal aortic aneurysm measuring 3.5 cm.  3. Lateral common iliac artery aneurysms measuring 2.3 cm on the right  and 2.0 cm on the left.  MAHESH POON MD    EXAMINATION: NM BONE SCAN WHOLE BODY Whole-body bone scan, 7/24/2019 3:22 PM   FINDINGS: Stable area of abnormal uptake involving the right humeral  head and adjacent acromion. Stable degenerative uptake in the  posterior cervical spine, acromioclavicular joints and right more than  left knee.                                                                 IMPRESSION:   Stable area of increased uptake in the right acromion and the humeral  head. No evidence of metastatic disease.  TEA BOYD MD    EXAM: CT ABDOMEN PELVIS W/O CONTRAST 8/27/2019 6:30 PM  ABDOMEN: No renal stones or hydronephrosis. No ureteral stone or hydroureter. No free air or free fluid. Stable distal abdominal aortic ectasia. No evidence for bowel obstruction. Large amount of stool in the colon. Liver, gallbladder, pancreas, spleen,   adrenal glands within normal limits.  PELVIS: Severe sigmoid diverticulosis without evidence for acute diverticulitis. Urinary bladder is collapsed, Alejo catheter present. Numerous metastatic implants are noted. There are small bilateral inguinal hernias, the right hernia contains a small   segment of nonobstructed small bowel. No adenopathy or mass. No free air or free fluid.  CONCLUSION:   1.  No acute abnormality in the abdomen or pelvis.  2.  The small right inguinal hernia contains a short segment of  nonobstructed small bowel.  3.  Severe sigmoid diverticulosis.     Laboratory:   I personally reviewed all applicable laboratory data and went over findings with patient  Significant for:    CBC RESULTS:  Recent Labs   Lab Test 08/27/19 1748 08/27/19  0857 08/14/19  1706 07/17/19  1704   WBC 7.5 6.0 7.1 6.2   HGB 13.2* 13.1* 14.0 13.0*    147* 178 159     BMP RESULTS:  Recent Labs   Lab Test 08/27/19 1748 08/27/19  0857 08/14/19  1706 07/17/19  1704    141 138 137   POTASSIUM 3.7 3.9 4.0 3.8   CHLORIDE 109 109 106 107   CO2 28 29 27 26   ANIONGAP 3 3 6 4   * 94 91 101*   BUN 16 15 22 22   CR 1.02 0.89 0.96 0.93   GFRESTIMATED 76 88 81 85   GFRESTBLACK 88 >90 >90 >90   KATHERYN 8.9 9.0 9.2 8.7     UA RESULTS:   Recent Labs   Lab Test 08/27/19 1956 08/27/19 0829 03/05/19  1038   SG Unable to assay due to interfering substance 1.020 1.020   URINEPH Unable to assay due to interfering substance 5.5 7.0   NITRITE Unable to assay due to interfering substance* Negative Negative   RBCU >182* >182*  --    WBCU 4 5  --      PSA RESULTS:   PSA   Date Value Ref Range Status   08/14/2019 1.46 0 - 4 ug/L Final     Comment:     Assay Method:  Chemiluminescence using Siemens Vista analyzer   07/17/2019 2.54 0 - 4 ug/L Final     Comment:     Assay Method:  Chemiluminescence using Siemens Vista analyzer   05/29/2019 5.40 (H) 0 - 4 ug/L Final     Comment:     Assay Method:  Chemiluminescence using Siemens Vista analyzer   05/01/2019 7.09 (H) 0 - 4 ug/L Final     Comment:     Assay Method:  Chemiluminescence using Siemens Vista analyzer   04/03/2019 21.50 (H) 0 - 4 ug/L Final     Comment:     Assay Method:  Chemiluminescence using Siemens Vista analyzer   03/13/2019 23.30 (H) 0 - 4 ug/L Final     Comment:     Assay Method:  Chemiluminescence using Siemens Vista analyzer   02/27/2019 17.80 (H) 0 - 4 ug/L Final     Comment:     Assay Method:  Chemiluminescence using Siemens Vista analyzer   01/21/2016 0.86 0 - 4 ug/L  Final   12/01/2006 16.3@ ng/mL      PSA of 0.82 in 03/2017     Scribe Disclosure:  I, Verónica Mamadou, am serving as a scribe to document services personally performed by Bernabe Arita MD at this visit, based upon the provider's statements to me. All documentation has been reviewed by the aforementioned provider prior to being entered into the official medical record.    Answers for HPI/ROS submitted by the patient on 9/9/2019   General Symptoms: No  Skin Symptoms: No  HENT Symptoms: Yes  EYE SYMPTOMS: Yes  HEART SYMPTOMS: No  LUNG SYMPTOMS: No  INTESTINAL SYMPTOMS: No  URINARY SYMPTOMS: Yes  REPRODUCTIVE SYMPTOMS: No  SKELETAL SYMPTOMS: Yes  BLOOD SYMPTOMS: No  NERVOUS SYSTEM SYMPTOMS: No  MENTAL HEALTH SYMPTOMS: No  Ear pain: No  Ear discharge: No  Hearing loss: No  Tinnitus: No  Nosebleeds: No  Congestion: No  Sinus pain: Yes  Trouble swallowing: No   Voice hoarseness: No  Mouth sores: No  Sore throat: No  Tooth pain: No  Gum tenderness: No  Bleeding gums: No  Change in taste: No  Change in sense of smell: No  Dry mouth: No  Hearing aid used: No  Neck lump: No  Eye pain: No  Vision loss: No  Watery eyes: No  Eye bulging: No  Double vision: No  Flashing of lights: Yes  Spots: Yes  Trouble holding urine or incontinence: No  Pain or burning: No  Trouble starting or stopping: Yes  Increased frequency of urination: Yes  Blood in urine: Yes  Decreased frequency of urination: No  Frequent nighttime urination: Yes  Flank pain: No  Back pain: Yes  Muscle aches: Yes  Neck pain: Yes  Swollen joints: No  Joint pain: No  Bone pain: No  Muscle cramps: No  Muscle weakness: No  Joint stiffness: No  Bone fracture: No

## 2019-09-09 ENCOUNTER — PRE VISIT (OUTPATIENT)
Dept: UROLOGY | Facility: CLINIC | Age: 69
End: 2019-09-09

## 2019-09-09 ENCOUNTER — OFFICE VISIT (OUTPATIENT)
Dept: UROLOGY | Facility: CLINIC | Age: 69
End: 2019-09-09
Payer: COMMERCIAL

## 2019-09-09 VITALS
HEIGHT: 74 IN | BODY MASS INDEX: 35.94 KG/M2 | HEART RATE: 62 BPM | SYSTOLIC BLOOD PRESSURE: 135 MMHG | WEIGHT: 280 LBS | DIASTOLIC BLOOD PRESSURE: 75 MMHG

## 2019-09-09 DIAGNOSIS — C61 PROSTATE CANCER (H): Primary | ICD-10-CM

## 2019-09-09 DIAGNOSIS — R31.0 GROSS HEMATURIA: ICD-10-CM

## 2019-09-09 ASSESSMENT — ENCOUNTER SYMPTOMS
STIFFNESS: 0
EYE WATERING: 0
FLANK PAIN: 0
MYALGIAS: 1
HOARSE VOICE: 0
ARTHRALGIAS: 0
HEMATURIA: 1
MUSCLE CRAMPS: 0
JOINT SWELLING: 0
DYSURIA: 0
NECK MASS: 0
SORE THROAT: 0
NECK PAIN: 1
TASTE DISTURBANCE: 0
SMELL DISTURBANCE: 0
DOUBLE VISION: 0
EYE PAIN: 0
SINUS CONGESTION: 0
MUSCLE WEAKNESS: 0
SINUS PAIN: 1
TROUBLE SWALLOWING: 0
BACK PAIN: 1

## 2019-09-09 ASSESSMENT — MIFFLIN-ST. JEOR: SCORE: 2119.82

## 2019-09-09 ASSESSMENT — PAIN SCALES - GENERAL: PAINLEVEL: NO PAIN (0)

## 2019-09-09 NOTE — NURSING NOTE
"Chief Complaint   Patient presents with     Consult     Stage 4 prostate patient. Hematuria first seen a few weeks ago.        Height 1.88 m (6' 2\"), weight 127 kg (280 lb). Body mass index is 35.95 kg/m .    Patient Active Problem List   Diagnosis     Embolism and thrombosis (H)     Malignant neoplasm of prostate (H)     CARDIOVASCULAR SCREENING; LDL GOAL LESS THAN 130     JACKI (obstructive sleep apnea)     Advanced directives, counseling/discussion     Anxiety     Bilateral pulmonary embolism (H)     Obesity (BMI 35.0-39.9) with comorbidity (H)       Allergies   Allergen Reactions     Ibuprofen Hives       Current Outpatient Medications   Medication Sig Dispense Refill     abiraterone (ZYTIGA) 250 MG tablet Take 4 tablets (1,000 mg) by mouth daily for 30 doses Take on empty stomach. 120 tablet 0     lidocaine (XYLOCAINE) 2 % external gel Place into the urethra daily (Patient not taking: Reported on 2019) 30 mL 0     prochlorperazine (COMPAZINE) 10 MG tablet Take 0.5 tablets (5 mg) by mouth every 6 hours as needed (Nausea/Vomiting) (Patient not taking: Reported on 2019) 30 tablet 2     rivaroxaban ANTICOAGULANT (XARELTO) 20 MG TABS tablet Take 1 tablet (20 mg) by mouth daily (with dinner) (Patient not taking: Reported on 2019) 30 tablet 5       Social History     Tobacco Use     Smoking status: Former Smoker     Packs/day: 0.25     Years: 35.00     Pack years: 8.75     Types: Cigarettes     Start date: 2010     Last attempt to quit: 2019     Years since quittin.5     Smokeless tobacco: Never Used   Substance Use Topics     Alcohol use: Yes     Alcohol/week: 0.0 oz     Frequency: 2-4 times a month     Drinks per session: 1 or 2     Binge frequency: Never     Comment: seldom     Drug use: No       Nae Clark LPN  2019  8:20 AM  "

## 2019-09-09 NOTE — LETTER
2019       RE: Keenan Sprague  [address]  [facility] [address]     Dear Colleague,    Thank you for referring your patient, Keenan Sprague, to the [facility] UROLOGY AND INST FOR PROSTATE AND UROLOGIC CANCERS at [facility]. Please see a copy of my visit note below.      Urology Clinic    Bernabe Arita MD  Date of Service: 2019     Name: Keenan Sprague  MRN: [id number]  Age: 68 year old  : 1950  Referring provider: Referred Self     Assessment:  History of prostate cancer   Keenan Sprague  is a 68 year old male with a history of GS= 3+4 prostate cancer. He is s/p hormonal therapy and brachytherapy in combination with external beam radiation therapy in . PSA remained undetectable until 2008 when it was 0.17 but continued to remain stable until it was measured at 17.80 in 2019 and 21.50 in 2019. He started Lupron 22.5mg every 3 months in 2019. He started Abiraterone 1000mg daily with Prednisone 5 mg every day per [facility] regimen 2019.     History of bilateral DVTs 2019-2019  He was started on Xarelto while in the ED in 2019 and recovered well. Although, he was seen in the ED in 2019 again passing clots in his urine. Discussed that gross hematuria was most likely related to his prostate cancer or hemorrhagic cystitis from the Xarelto. At that time he was told to stop Xarelto. Now his hematuria and shortness of breath has resolved. I prefer him to remain off anticoagulation if possible.  The existing DVTs should have resolve by now.     Plan:  -Encouraged him to follow up with PCP for lower extremity US to check for DVT  -He will schedule a cystoscopy for Thursday end of day. Not ready to have one completed today. (Still traumatized from wiley last month removed about a week ago). But need to rule out the small but measurable risk of bladder malignancy as the cause of the  heamaturia    Attestation:  This patient was seen and evaluated by me, with a scribe taking notes.  I have reviewed the note above and agree.  The physical exam and or any procedures were performed by me and the pertinant details are outlined below.       Bernabe Arita MD  Department of Urology  [facility]    ---------------------------------------------------------------------------------------------------------------------    Chief Complaint:   Chief Complaint   Patient presents with     Consult     Stage 4 prostate patient. Hematuria first seen a few weeks ago.        HPI:   Keenan Sprague  is a 68 year old male with a history of GS= 3+4 prostate cancer. He is s/p hormonal therapy and brachytherapy in combination with external beam radiation therapy in 2007. PSA remained undetectable until 11/2008 when it was 0.17. It remained relatively stable until it elevated to around 0.27 in 2015, 0.82 in 2017 and significantly to 17.80 in 02/2019, 23.3 in 03/2019 and 21.50 in 04/2019. He started Lupron 22.5mg every 3 months in 04/2019. He started Abiraterone 1000mg daily with Prednisone 5 mg every day per [facility] regimen 04/09/2019. He had CT CAP and NM bone scan revealing stable disease with no evidence of disease progression in 07/2019. He has a history of smoking 1/4 pack per day for 35 years but is not currently smoking (1/2 year).     Initial PSA at diagnosis: 16.3   1st Biopsy (Date 12/2006) Yamil Score was 3 + 4 in the right mid/median prostate affecting 40% of the tissue, right apex affecting 50% of the tissue, right mid/lateral prostate affecting 30% of the tissue and right apex/lateral affecting 30% of the tissue and Yamil Score was 3 + 3  In the right base affecting 90% of the tissue.       Nathan was admitted at [facility] between 02/22/2019-02/26/2019 for acute hypoxemic respiratory failure because of bilateral pulmonary emboli.  He  was on systemic anticoagulation and  recovered quite well .      Though, 08/27, he presented to the ED with gross hematuria (passing blood clots). He was found to have 400cc of residual volume and required a wiley catheter as he was unable to void on his own. When wiley was placed, 800cc of hematuria was drained with one large blood clot. ED determined hematuria was most likely related to his prostate cancer or hemorrhagic cystitis (on Xarelto). He was discharged as hematuria had resolved and wiley was draining well. However, later the same evening, he presented to the ED again but for dysuria, abdominal pain and discomfort at the tip of his penis with voiding. Physical exam, imaging and labs were all unremarkable. He was sent home with Oxybutynin, Pyridium, and Urojet to go home with. 08/30 he was seen in the ED again for evaluation of wiley. He presented with clear arnaldo urine and a desired to have his Wiley removed.     He has also recovered from the right shoulder arthroplasty performed in early 01/2019 and is now back at work full-time.     Denies gross hematuria or shortness of breath since being off the Xarelto.     Review of Systems:   Pertinent items are noted in HPI or as below, remainder of complete ROS is negative.      Active Medications:      abiraterone (ZYTIGA) 250 MG tablet, Take 4 tablets (1,000 mg) by mouth daily for 30 doses Take on empty stomach., Disp: 120 tablet, Rfl: 0     lidocaine (XYLOCAINE) 2 % external gel, Place into the urethra daily (Patient not taking: Reported on 9/9/2019), Disp: 30 mL, Rfl: 0     prochlorperazine (COMPAZINE) 10 MG tablet, Take 0.5 tablets (5 mg) by mouth every 6 hours as needed (Nausea/Vomiting) (Patient not taking: Reported on 5/29/2019), Disp: 30 tablet, Rfl: 2     rivaroxaban ANTICOAGULANT (XARELTO) 20 MG TABS tablet, Take 1 tablet (20 mg) by mouth daily (with dinner) (Patient not taking: Reported on 9/9/2019), Disp: 30 tablet, Rfl: 5      Allergies:   Ibuprofen      Past Medical History:  Prostate  "cancer   Obstructive sleep apnea   Anxiety   Bilateral pulmonary embolism   Obesity      Past Surgical History:  Bone graft for left hand fracture- 1972   Seed implant for prostate-2007   Tonsillectomy     Family History:   No pertinent family history       Social History:   The patient was alone   Smoking Status: former; 1/4 pack per day for 35 years; 1/2 year since quitting   Smokeless Tobacco: never    Alcohol Use: yes; 2-4 drinks per month    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      Physical Exam:   /75   Pulse 62   Ht 1.88 m (6' 2\")   Wt 127 kg (280 lb)   BMI 35.95 kg/m      Body mass index is 35.95 kg/m .  General: Alert, no acute distress, oriented  HENT: Good dentition  Neuro: Alert, oriented, speech and mentation normal  Psych: Affect and mood normal  Gait: Normal    Imaging:   I personally reviewed all applicable imaging and went over the below findings with patient.    CT of the abdomen and pelvis with contrast 3/6/2019 11:33 AM  Abdomen/pelvis:  Diffuse low-density hepatic parenchyma no focal hepatic mass. Spleen,  pancreas and adrenal glands are unremarkable. No hydronephrosis or  large renal stone. Large and small bowel are nondilated without  evidence of obstruction. Brachytherapy seeds are present in the  prostate. The bladder is prominently decompressed. The gallbladder is  nondilated without wall thickening or pericholecystic abnormality.                                                                 IMPRESSION:  1.  Retroperitoneal lymphadenopathy. Largest periaortic lymph node  measures 17 mm in short axis. Additional left retroperitoneal lymph  node measures 2.1 cm in short axis.  2.  Diverticulosis.  3.  Hepatic steatosis.  GEN GUNTER MD    NUCLEAR MEDICINE WHOLE BODY BONE SCAN  3/6/2019 2:34 PM  FINDINGS: Prominent area of abnormal uptake involving the right  humeral head and adjacent acromion. This could be degenerative in  nature or related to " metastatic disease. Recommend MRI correlation.  There were no abnormalities here on a 6/29/2017 x-ray.  Degenerative uptake in the right posterior cervical spine.  Degenerative uptake in the left AC joint. Otherwise unremarkable.   IMPRESSION:   1. There is a new area of increased uptake in the right acromion and  right humeral head. Given this distribution, it is likely degenerative  in nature. Recommend MRI for further evaluation to exclude metastatic  disease.  2. Degenerative uptake in the right posterior cervical spine.  3. Otherwise no suspicious areas of uptake.  BRAYAN ALEXANDER MD    US UPPER EXTREMITY VENOUS DUPLEX RIGHT 3/13/2019 4:45 PM  CLINICAL HISTORY/INDICATION:  New acute PE; had h/o right shoulder  arthroplasty; eval for residual RUE clot.; Malignant neoplasm of  prostate (H)  FINDINGS:   The right jugular vein demonstrates normal compressibility, color-flow  and spectral waveform. The right subclavian vein, axillary vein, cephalic vein, brachial vein  and basilic vein demonstrate normal compressibility, spectral  waveform, color flow and augmentation.  IMPRESSION:   No evidence of deep venous thrombosis in the right upper extremity  HECTOR GONZALEZ DO    EXAMINATION: CT CHEST/ABDOMEN/PELVIS W CONTRAST, 7/24/2019 1:32 PM  Abdomen and pelvis: No focal hepatic lesion or intrahepatic biliary  dilatation. The gallbladder, pancreas, spleen, adrenal glands, and  kidneys are unremarkable. The urinary bladder is grossly unremarkable.  Brachytherapy seeds present within the prostate. Colonic  diverticulosis without evidence of diverticulitis. No dilated loops of  bowel or bowel wall thickening. Normal nondilated appendix. Pelvic  phleboliths. No free air free fluid. A few prominent iliac chain lymph  nodes, none of which are enlarged. Multiple prominent mesenteric. The  previously enlarged left periaortic lymph node now measures 9 mm  (series 3, image 364). Stable left periaortic enlarged lymph  node  measures 2.1 x 1.9 cm (series 8, image 412). However, the overall  appearance of the retroperitoneal lymph nodes appear decreased in size  from prior. Major intra-abdominal vessels are patent. Fusiform  infrarenal abdominal aortic aneurysm measuring 3.5 x 3.3 cm in  greatest dimensions, unchanged. Calcified and noncalcified and  calcified of the abdominal aorta and iliac vessels. Aneurysmal  dilation of the left common iliac artery measuring 2.0 cm. Aneurysmal  dilatation of the right, no acute artery measuring 2.3 cm.   IMPRESSION: In this patient with history of metastatic prostate  cancer:  1. Stable left periaortic lymph node measuring 2.1 cm. Additional  retroperitoneal lymph nodes have decreased in size from the prior  exam.  2. Fusiform infrarenal abdominal aortic aneurysm measuring 3.5 cm.  3. Lateral common iliac artery aneurysms measuring 2.3 cm on the right  and 2.0 cm on the left.  MAHESH POON MD    EXAMINATION: NM BONE SCAN WHOLE BODY Whole-body bone scan, 7/24/2019 3:22 PM   FINDINGS: Stable area of abnormal uptake involving the right humeral  head and adjacent acromion. Stable degenerative uptake in the  posterior cervical spine, acromioclavicular joints and right more than  left knee.                                                                 IMPRESSION:   Stable area of increased uptake in the right acromion and the humeral  head. No evidence of metastatic disease.  TEA BOYD MD    EXAM: CT ABDOMEN PELVIS W/O CONTRAST 8/27/2019 6:30 PM  ABDOMEN: No renal stones or hydronephrosis. No ureteral stone or hydroureter. No free air or free fluid. Stable distal abdominal aortic ectasia. No evidence for bowel obstruction. Large amount of stool in the colon. Liver, gallbladder, pancreas, spleen,   adrenal glands within normal limits.  PELVIS: Severe sigmoid diverticulosis without evidence for acute diverticulitis. Urinary bladder is collapsed, Alejo catheter present. Numerous metastatic  implants are noted. There are small bilateral inguinal hernias, the right hernia contains a small   segment of nonobstructed small bowel. No adenopathy or mass. No free air or free fluid.  CONCLUSION:   1.  No acute abnormality in the abdomen or pelvis.  2.  The small right inguinal hernia contains a short segment of nonobstructed small bowel.  3.  Severe sigmoid diverticulosis.     Laboratory:   I personally reviewed all applicable laboratory data and went over findings with patient  Significant for:    CBC RESULTS:  Recent Labs   Lab Test 08/27/19 1748 08/27/19 0857 08/14/19  1706 07/17/19  1704   WBC 7.5 6.0 7.1 6.2   HGB 13.2* 13.1* 14.0 13.0*    147* 178 159     BMP RESULTS:  Recent Labs   Lab Test 08/27/19 1748 08/27/19 0857 08/14/19 1706 07/17/19  1704    141 138 137   POTASSIUM 3.7 3.9 4.0 3.8   CHLORIDE 109 109 106 107   CO2 28 29 27 26   ANIONGAP 3 3 6 4   * 94 91 101*   BUN 16 15 22 22   CR 1.02 0.89 0.96 0.93   GFRESTIMATED 76 88 81 85   GFRESTBLACK 88 >90 >90 >90   KATHERYN 8.9 9.0 9.2 8.7     UA RESULTS:   Recent Labs   Lab Test 08/27/19 1956 08/27/19 0829 03/05/19  1038   SG Unable to assay due to interfering substance 1.020 1.020   URINEPH Unable to assay due to interfering substance 5.5 7.0   NITRITE Unable to assay due to interfering substance* Negative Negative   RBCU >182* >182*  --    WBCU 4 5  --      PSA RESULTS:   PSA   Date Value Ref Range Status   08/14/2019 1.46 0 - 4 ug/L Final     Comment:     Assay Method:  Chemiluminescence using Siemens Vista analyzer   07/17/2019 2.54 0 - 4 ug/L Final     Comment:     Assay Method:  Chemiluminescence using Siemens Vista analyzer   05/29/2019 5.40 (H) 0 - 4 ug/L Final     Comment:     Assay Method:  Chemiluminescence using Siemens Vista analyzer   05/01/2019 7.09 (H) 0 - 4 ug/L Final     Comment:     Assay Method:  Chemiluminescence using Siemens Vista analyzer   04/03/2019 21.50 (H) 0 - 4 ug/L Final     Comment:     Assay  Method:  Chemiluminescence using Siemens Vista analyzer   03/13/2019 23.30 (H) 0 - 4 ug/L Final     Comment:     Assay Method:  Chemiluminescence using Siemens Vista analyzer   02/27/2019 17.80 (H) 0 - 4 ug/L Final     Comment:     Assay Method:  Chemiluminescence using Siemens Vista analyzer   01/21/2016 0.86 0 - 4 ug/L Final   12/01/2006 16.3@ ng/mL      PSA of 0.82 in 03/2017     Scribe Disclosure:  I, Verónica Cedillo, am serving as a scribe to document services personally performed by Bernabe Arita MD at this visit, based upon the provider's statements to me. All documentation has been reviewed by the aforementioned provider prior to being entered into the official medical record.    Answers for HPI/ROS submitted by the patient on 9/9/2019   General Symptoms: No  Skin Symptoms: No  HENT Symptoms: Yes  EYE SYMPTOMS: Yes  HEART SYMPTOMS: No  LUNG SYMPTOMS: No  INTESTINAL SYMPTOMS: No  URINARY SYMPTOMS: Yes  REPRODUCTIVE SYMPTOMS: No  SKELETAL SYMPTOMS: Yes  BLOOD SYMPTOMS: No  NERVOUS SYSTEM SYMPTOMS: No  MENTAL HEALTH SYMPTOMS: No  Ear pain: No  Ear discharge: No  Hearing loss: No  Tinnitus: No  Nosebleeds: No  Congestion: No  Sinus pain: Yes  Trouble swallowing: No   Voice hoarseness: No  Mouth sores: No  Sore throat: No  Tooth pain: No  Gum tenderness: No  Bleeding gums: No  Change in taste: No  Change in sense of smell: No  Dry mouth: No  Hearing aid used: No  Neck lump: No  Eye pain: No  Vision loss: No  Watery eyes: No  Eye bulging: No  Double vision: No  Flashing of lights: Yes  Spots: Yes  Trouble holding urine or incontinence: No  Pain or burning: No  Trouble starting or stopping: Yes  Increased frequency of urination: Yes  Blood in urine: Yes  Decreased frequency of urination: No  Frequent nighttime urination: Yes  Flank pain: No  Back pain: Yes  Muscle aches: Yes  Neck pain: Yes  Swollen joints: No  Joint pain: No  Bone pain: No  Muscle cramps: No  Muscle weakness: No  Joint stiffness:  No  Bone fracture: No                Again, thank you for allowing me to participate in the care of your patient.      Sincerely,    Bernabe Arita MD

## 2019-09-10 ENCOUNTER — OFFICE VISIT (OUTPATIENT)
Dept: INTERNAL MEDICINE | Facility: CLINIC | Age: 69
End: 2019-09-10
Payer: COMMERCIAL

## 2019-09-10 VITALS
SYSTOLIC BLOOD PRESSURE: 138 MMHG | DIASTOLIC BLOOD PRESSURE: 70 MMHG | HEART RATE: 84 BPM | RESPIRATION RATE: 16 BRPM | HEIGHT: 74 IN | OXYGEN SATURATION: 96 % | WEIGHT: 280 LBS | TEMPERATURE: 98.9 F | BODY MASS INDEX: 35.94 KG/M2

## 2019-09-10 DIAGNOSIS — C61 MALIGNANT NEOPLASM OF PROSTATE (H): ICD-10-CM

## 2019-09-10 DIAGNOSIS — R31.0 GROSS HEMATURIA: ICD-10-CM

## 2019-09-10 DIAGNOSIS — Z09 HOSPITAL DISCHARGE FOLLOW-UP: Primary | ICD-10-CM

## 2019-09-10 DIAGNOSIS — Z86.711 HISTORY OF PULMONARY EMBOLISM: ICD-10-CM

## 2019-09-10 PROCEDURE — 99214 OFFICE O/P EST MOD 30 MIN: CPT | Performed by: INTERNAL MEDICINE

## 2019-09-10 ASSESSMENT — MIFFLIN-ST. JEOR: SCORE: 2119.82
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
## 2019-09-10 NOTE — NURSING NOTE
"Vital signs:  Temp: 98.9  F (37.2  C) Temp src: Oral BP: 138/70 Pulse: 84   Resp: 16 SpO2: 96 %     Height: 188 cm (6' 2\") Weight: 127 kg (280 lb)  Estimated body mass index is 35.95 kg/m  as calculated from the following:    Height as of this encounter: 1.88 m (6' 2\").    Weight as of this encounter: 127 kg (280 lb).          "

## 2019-09-11 PROBLEM — Z86.711 HISTORY OF PULMONARY EMBOLISM: Status: ACTIVE | Noted: 2019-09-11

## 2019-09-23 ENCOUNTER — NURSE TRIAGE (OUTPATIENT)
Dept: INTERNAL MEDICINE | Facility: CLINIC | Age: 69
End: 2019-09-23

## 2019-09-23 DIAGNOSIS — J01.01 ACUTE RECURRENT MAXILLARY SINUSITIS: Primary | ICD-10-CM

## 2019-09-23 RX ORDER — AMOXICILLIN 500 MG/1
500 CAPSULE ORAL 3 TIMES DAILY
Qty: 30 CAPSULE | Refills: 0 | Status: SHIPPED | OUTPATIENT
Start: 2019-09-23 | End: 2019-10-16

## 2019-09-23 NOTE — TELEPHONE ENCOUNTER
Patient is calling because he is pretty sure he has a sinus infection. He had to called in sick to work today because of it.  .

## 2019-09-23 NOTE — TELEPHONE ENCOUNTER
Patient states symptoms of congestion started about a week ago. Patient states he has pressure behind his eyes, rates it 9/10. Nasal discharge is green/yellow. Patient unsure if he has a fever.    Patient requesting an antibiotic for a sinus infection. Patient states he knows himself, and knows that is what this is. Will come in if he has to, but has minimal sick time at work. Please advise.     Call patient back at [phone]

## 2019-09-25 ENCOUNTER — TELEPHONE (OUTPATIENT)
Dept: ONCOLOGY | Facility: CLINIC | Age: 69
End: 2019-09-25

## 2019-09-25 DIAGNOSIS — C61 MALIGNANT NEOPLASM OF PROSTATE (H): Primary | ICD-10-CM

## 2019-09-25 RX ORDER — ABIRATERONE ACETATE 250 MG/1
1000 TABLET ORAL DAILY
Qty: 120 TABLET | Refills: 0 | Status: SHIPPED | OUTPATIENT
Start: 2019-09-25 | End: 2019-10-25

## 2019-09-25 RX ORDER — PREDNISONE 5 MG/1
5 TABLET ORAL DAILY
Qty: 30 TABLET | Refills: 0
Start: 2019-09-25 | End: 2019-11-25

## 2019-09-26 ENCOUNTER — TELEPHONE (OUTPATIENT)
Dept: ONCOLOGY | Facility: CLINIC | Age: 69
End: 2019-09-26

## 2019-09-26 NOTE — ORAL ONC MGMT
Oral Chemotherapy Monitoring Program    Primary Oncologist: Dr. Davis  Primary Oncology Clinic: [facility]  Cancer Diagnosis: Prostate Cancer    Therapy History:  4/19/19: start Abiraterone 1000 mg ([id number]) daily (with prednisone 5 mg daily)  Next cycle 10/6    Drug Interaction Assessment: no new drug interactions (new medications: amoxicillin and vitamin C)    Lab Monitoring Plan  CMP and CBC monthly  Subjective/Objective:  Keenan Sprague is a 68 year old male contacted by phone for a follow-up visit for oral chemotherapy.  Nathan continues to tolerate abiraterone and prednisone well. He denies any adverse effects or missed doses of medications. His only recently medication changes are a short course of amoxicillin and vitamin C for a recent cold, neither of which interact with abiraterone. He is due to start his next cycle on 10/6.     ORAL CHEMOTHERAPY 4/3/2019 4/15/2019 5/15/2019 7/30/2019 8/29/2019 9/26/2019   Drug Name Zytiga (Abiraterone) Zytiga (Abiraterone) Zytiga (Abiraterone) Zytiga (Abiraterone) Zytiga (Abiraterone) Zytiga (Abiraterone)   Current Dosage 1000mg 1000mg 1000mg 1000mg 1000mg 1000mg   Current Schedule Daily Daily Daily Daily Daily Daily   Cycle Details Continuous Continuous Continuous Continuous Continuous Continuous   Start Date of Last Cycle - 4/9/2019 - 7/8/2019 8/7/2019 9/6/2019   Planned next cycle start date - - - 8/7/2019 9/6/2019 10/6/2019   Doses missed in last 2 weeks - - 0 0 0 0   Adherence Assessment - Adherent Adherent Adherent Adherent Adherent   Adverse Effects - No AE identified during assessment No AE identified during assessment Other (see note for details) Other (see note for details) No AE identified during assessment   Other (see note for details) - - - hot flashes hot flashes -   Pharmacist intervention? - - - Yes No -   Intervention(s) - - - Referral to oncology provider - -   Any new drug interactions? No No No No No No   Is the dose as ordered appropriate for  "the patient? Yes Yes - Yes Yes Yes   Has the patient been assessed within the past 6 months for depression? - - - Yes Yes -   Has the patient missed any days of school, work, or other routine activity? - - - No No No       Last PHQ-2 Score on record:   PHQ-2 ( 1999 [employer]) 3/5/2019 12/19/2018   Q1: Little interest or pleasure in doing things 0 0   Q2: Feeling down, depressed or hopeless 0 0   PHQ-2 Score 0 0         Vitals:  BP:   BP Readings from Last 1 Encounters:   09/10/19 138/70     Wt Readings from Last 1 Encounters:   09/10/19 127 kg (280 lb)     Estimated body surface area is 2.58 meters squared as calculated from the following:    Height as of 9/10/19: 1.88 m (6' 2\").    Weight as of 9/10/19: 127 kg (280 lb).    Labs:  _  Result Component Current Result Ref Range   Sodium 140 (8/27/2019) 133 - 144 mmol/L     _  Result Component Current Result Ref Range   Potassium 3.7 (8/27/2019) 3.4 - 5.3 mmol/L     _  Result Component Current Result Ref Range   Calcium 8.9 (8/27/2019) 8.5 - 10.1 mg/dL     No results found for Mag within last 30 days.     No results found for Phos within last 30 days.     _  Result Component Current Result Ref Range   Albumin 3.6 (8/27/2019) 3.4 - 5.0 g/dL     _  Result Component Current Result Ref Range   Urea Nitrogen 16 (8/27/2019) 7 - 30 mg/dL     _  Result Component Current Result Ref Range   Creatinine 1.02 (8/27/2019) 0.66 - 1.25 mg/dL       _  Result Component Current Result Ref Range   AST 23 (8/27/2019) 0 - 45 U/L     _  Result Component Current Result Ref Range   ALT 31 (8/27/2019) 0 - 70 U/L     _  Result Component Current Result Ref Range   Bilirubin Total 0.5 (8/27/2019) 0.2 - 1.3 mg/dL       _  Result Component Current Result Ref Range   WBC 7.5 (8/27/2019) 4.0 - 11.0 10e9/L     _  Result Component Current Result Ref Range   Hemoglobin 13.2 (L) (8/27/2019) 13.3 - 17.7 g/dL     _  Result Component Current Result Ref Range   Platelet Count 154 (8/27/2019) 150 - 450 10e9/L "     _  Result Component Current Result Ref Range   Absolute Neutrophil 5.8 (8/27/2019) 1.6 - 8.3 10e9/L       Assessment:  Nathan continues to tolerate abiraterone and prednisone well.    Plan:  Continue current therapy.    Follow-Up:  10/15: labs and [facility] appointment    Refill Due:  [facility] will fill abiraterone and prednisone for  9/28 at site [address] [place] [address].    Nae Engel, PharmD, BCOP  Hematology/Oncology Clinical Pharmacist  [facility]  [facility]  [phone]

## 2019-10-16 ENCOUNTER — ONCOLOGY VISIT (OUTPATIENT)
Dept: ONCOLOGY | Facility: CLINIC | Age: 69
End: 2019-10-16
Attending: INTERNAL MEDICINE
Payer: COMMERCIAL

## 2019-10-16 ENCOUNTER — APPOINTMENT (OUTPATIENT)
Dept: LAB | Facility: CLINIC | Age: 69
End: 2019-10-16
Attending: INTERNAL MEDICINE
Payer: COMMERCIAL

## 2019-10-16 VITALS
OXYGEN SATURATION: 97 % | DIASTOLIC BLOOD PRESSURE: 74 MMHG | RESPIRATION RATE: 16 BRPM | TEMPERATURE: 98 F | WEIGHT: 287.6 LBS | BODY MASS INDEX: 36.93 KG/M2 | SYSTOLIC BLOOD PRESSURE: 150 MMHG | HEART RATE: 62 BPM

## 2019-10-16 DIAGNOSIS — C61 MALIGNANT NEOPLASM OF PROSTATE (H): Primary | ICD-10-CM

## 2019-10-16 DIAGNOSIS — Z79.899 ENCOUNTER FOR LONG-TERM (CURRENT) USE OF MEDICATIONS: ICD-10-CM

## 2019-10-16 LAB
ALBUMIN SERPL-MCNC: 4.1 G/DL (ref 3.4–5)
ALP SERPL-CCNC: 79 U/L (ref 40–150)
ALT SERPL W P-5'-P-CCNC: 30 U/L (ref 0–70)
ANION GAP SERPL CALCULATED.3IONS-SCNC: 9 MMOL/L (ref 3–14)
AST SERPL W P-5'-P-CCNC: 22 U/L (ref 0–45)
BASOPHILS # BLD AUTO: 0 10E9/L (ref 0–0.2)
BASOPHILS NFR BLD AUTO: 0.6 %
BILIRUB SERPL-MCNC: 0.6 MG/DL (ref 0.2–1.3)
BUN SERPL-MCNC: 18 MG/DL (ref 7–30)
CALCIUM SERPL-MCNC: 9.1 MG/DL (ref 8.5–10.1)
CHLORIDE SERPL-SCNC: 105 MMOL/L (ref 94–109)
CHOLEST SERPL-MCNC: 208 MG/DL
CO2 SERPL-SCNC: 24 MMOL/L (ref 20–32)
CREAT SERPL-MCNC: 0.81 MG/DL (ref 0.66–1.25)
DIFFERENTIAL METHOD BLD: ABNORMAL
EOSINOPHIL # BLD AUTO: 0.1 10E9/L (ref 0–0.7)
EOSINOPHIL NFR BLD AUTO: 1.1 %
ERYTHROCYTE [DISTWIDTH] IN BLOOD BY AUTOMATED COUNT: 13.1 % (ref 10–15)
GFR SERPL CREATININE-BSD FRML MDRD: >90 ML/MIN/{1.73_M2}
GLUCOSE SERPL-MCNC: 90 MG/DL (ref 70–99)
HCT VFR BLD AUTO: 38.9 % (ref 40–53)
HDLC SERPL-MCNC: 61 MG/DL
HGB BLD-MCNC: 13.4 G/DL (ref 13.3–17.7)
IMM GRANULOCYTES # BLD: 0 10E9/L (ref 0–0.4)
IMM GRANULOCYTES NFR BLD: 0.3 %
LDLC SERPL CALC-MCNC: 134 MG/DL
LYMPHOCYTES # BLD AUTO: 1.5 10E9/L (ref 0.8–5.3)
LYMPHOCYTES NFR BLD AUTO: 24.7 %
MCH RBC QN AUTO: 31 PG (ref 26.5–33)
MCHC RBC AUTO-ENTMCNC: 34.4 G/DL (ref 31.5–36.5)
MCV RBC AUTO: 90 FL (ref 78–100)
MONOCYTES # BLD AUTO: 0.4 10E9/L (ref 0–1.3)
MONOCYTES NFR BLD AUTO: 5.8 %
NEUTROPHILS # BLD AUTO: 4.2 10E9/L (ref 1.6–8.3)
NEUTROPHILS NFR BLD AUTO: 67.5 %
NONHDLC SERPL-MCNC: 147 MG/DL
NRBC # BLD AUTO: 0 10*3/UL
NRBC BLD AUTO-RTO: 0 /100
PLATELET # BLD AUTO: 148 10E9/L (ref 150–450)
POTASSIUM SERPL-SCNC: 4 MMOL/L (ref 3.4–5.3)
PROT SERPL-MCNC: 7.4 G/DL (ref 6.8–8.8)
PSA SERPL-MCNC: 0.23 UG/L (ref 0–4)
RBC # BLD AUTO: 4.32 10E12/L (ref 4.4–5.9)
SODIUM SERPL-SCNC: 138 MMOL/L (ref 133–144)
TRIGL SERPL-MCNC: 64 MG/DL
WBC # BLD AUTO: 6.2 10E9/L (ref 4–11)

## 2019-10-16 PROCEDURE — 86316 IMMUNOASSAY TUMOR OTHER: CPT | Performed by: INTERNAL MEDICINE

## 2019-10-16 PROCEDURE — 85025 COMPLETE CBC W/AUTO DIFF WBC: CPT | Performed by: INTERNAL MEDICINE

## 2019-10-16 PROCEDURE — 84153 ASSAY OF PSA TOTAL: CPT | Performed by: INTERNAL MEDICINE

## 2019-10-16 PROCEDURE — G0463 HOSPITAL OUTPT CLINIC VISIT: HCPCS | Mod: ZF

## 2019-10-16 PROCEDURE — 36415 COLL VENOUS BLD VENIPUNCTURE: CPT

## 2019-10-16 PROCEDURE — 25000128 H RX IP 250 OP 636: Mod: ZF

## 2019-10-16 PROCEDURE — 84403 ASSAY OF TOTAL TESTOSTERONE: CPT | Performed by: INTERNAL MEDICINE

## 2019-10-16 PROCEDURE — 80053 COMPREHEN METABOLIC PANEL: CPT | Performed by: INTERNAL MEDICINE

## 2019-10-16 PROCEDURE — 99214 OFFICE O/P EST MOD 30 MIN: CPT | Mod: ZP | Performed by: INTERNAL MEDICINE

## 2019-10-16 PROCEDURE — 96402 CHEMO HORMON ANTINEOPL SQ/IM: CPT

## 2019-10-16 PROCEDURE — 80061 LIPID PANEL: CPT | Performed by: INTERNAL MEDICINE

## 2019-10-16 RX ORDER — LIDOCAINE HYDROCHLORIDE 20 MG/ML
JELLY TOPICAL
Refills: 0 | COMMUNITY
Start: 2019-08-27 | End: 2019-10-16

## 2019-10-16 RX ORDER — OXYBUTYNIN CHLORIDE 5 MG/1
TABLET ORAL
Refills: 0 | COMMUNITY
Start: 2019-08-27 | End: 2020-07-22

## 2019-10-16 RX ORDER — PHENAZOPYRIDINE HYDROCHLORIDE 100 MG/1
TABLET, FILM COATED ORAL
Refills: 0 | COMMUNITY
Start: 2019-08-27 | End: 2019-10-16

## 2019-10-16 ASSESSMENT — PAIN SCALES - GENERAL: PAINLEVEL: NO PAIN (0)

## 2019-10-16 NOTE — PROGRESS NOTES
Patient was given Lupron in left ventralgluteal. Patient tolerated well. See MAR.    Ever Muhammad, EMT
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
## 2019-10-16 NOTE — NURSING NOTE
Chief Complaint   Patient presents with     Blood Draw     labs drawn via vpt by rn. vs taken      Blood drawn via vpt by RN in lab. VS taken. Pt checked into next appointment.   Macey Harris RN

## 2019-10-16 NOTE — NURSING NOTE
Lupron injection given in left Ventral Gluteal without complication.  Patient tolerated well and was discharged.    Ever Muhammad, EMT

## 2019-10-16 NOTE — NURSING NOTE
"Oncology Rooming Note    October 16, 2019 5:11 PM   Keenan Sprague is a 68 year old male who presents for:    Chief Complaint   Patient presents with     Blood Draw     labs drawn via vpt by rn. vs taken      Oncology Clinic Visit     Return; Prostate Ca     Initial Vitals: BP (!) 150/74 (BP Location: Right arm, Patient Position: Sitting, Cuff Size: Adult Large)   Pulse 62   Temp 98  F (36.7  C) (Oral)   Resp 16   Wt 130.5 kg (287 lb 9.6 oz)   SpO2 97%   BMI 36.93 kg/m   Estimated body mass index is 36.93 kg/m  as calculated from the following:    Height as of 9/10/19: 1.88 m (6' 2\").    Weight as of this encounter: 130.5 kg (287 lb 9.6 oz). Body surface area is 2.61 meters squared.  No Pain (0) Comment: Data Unavailable   No LMP for male patient.  Allergies reviewed: Yes  Medications reviewed: Yes    Medications: Medication refills not needed today.  Pharmacy name entered into [employer]:    [facility] - [place], [place] - [address]  [facility]  [facility] - [place], [place] - [address]  [id number] - [place], [place] - [address]  [facility] - [place], [place] - [address] [address]    Clinical concerns: Has questions about a possible surgery and how that would change his current therapy.       Uzma Gonzalez [facility]              "

## 2019-10-17 ENCOUNTER — TELEPHONE (OUTPATIENT)
Dept: UROLOGY | Facility: CLINIC | Age: 69
End: 2019-10-17

## 2019-10-17 NOTE — TELEPHONE ENCOUNTER
Patient called regarding prep for cystoscopy  No prep.  Message left. Keyana Harris LPN Staff Nurse

## 2019-10-17 NOTE — TELEPHONE ENCOUNTER
[facility] Call Center    Phone Message    May a detailed message be left on voicemail: yes    Reason for Call: Other: Pt is wanting to know from Dr. Bernabe Arita for the cystoscopy is there any special instructions that pt needs to do before his cystoscopy? Please call the pt back,thank you     Action Taken: Message routed to:  [facility] ([facility]): URology

## 2019-10-19 LAB — CGA SERPL-MCNC: 73 NG/ML (ref 0–160)

## 2019-10-19 PROCEDURE — 25000128 H RX IP 250 OP 636: Mod: ZF

## 2019-10-20 LAB — TESTOST SERPL-MCNC: <2 NG/DL (ref 240–950)

## 2019-10-25 DIAGNOSIS — C61 MALIGNANT NEOPLASM OF PROSTATE (H): Primary | ICD-10-CM

## 2019-10-25 RX ORDER — ABIRATERONE ACETATE 250 MG/1
1000 TABLET ORAL DAILY
Qty: 120 TABLET | Refills: 0 | Status: SHIPPED | OUTPATIENT
Start: 2019-10-25 | End: 2020-02-20

## 2019-10-29 ENCOUNTER — TELEPHONE (OUTPATIENT)
Dept: ONCOLOGY | Facility: CLINIC | Age: 69
End: 2019-10-29

## 2019-10-29 NOTE — ORAL ONC MGMT
Oral Chemotherapy Monitoring Program    Placed call 10/29 and 11/1 in follow up of oral chemotherapy requesting call back. No patient names or medication names mentioned. Will update when response received.       Thank you for the opportunity to participate in the care of the above patient,  Zhen Castillo, PharmD  Hematology/Oncology Clinical Pharmacist  [facility]  [facility]  [phone]

## 2019-11-05 ENCOUNTER — HEALTH MAINTENANCE LETTER (OUTPATIENT)
Age: 69
End: 2019-11-05

## 2019-11-06 ENCOUNTER — HOSPITAL ENCOUNTER (OUTPATIENT)
Dept: NUCLEAR MEDICINE | Facility: CLINIC | Age: 69
Setting detail: NUCLEAR MEDICINE
Discharge: HOME | End: 2019-11-06
Attending: INTERNAL MEDICINE | Admitting: INTERNAL MEDICINE
Payer: COMMERCIAL

## 2019-11-06 ENCOUNTER — HOSPITAL ENCOUNTER (OUTPATIENT)
Dept: NUCLEAR MEDICINE | Facility: CLINIC | Age: 69
Setting detail: NUCLEAR MEDICINE
End: 2019-11-06
Attending: INTERNAL MEDICINE
Payer: COMMERCIAL

## 2019-11-06 ENCOUNTER — HOSPITAL ENCOUNTER (OUTPATIENT)
Dept: CT IMAGING | Facility: CLINIC | Age: 69
End: 2019-11-06
Attending: INTERNAL MEDICINE
Payer: COMMERCIAL

## 2019-11-06 DIAGNOSIS — C61 MALIGNANT NEOPLASM OF PROSTATE (H): ICD-10-CM

## 2019-11-06 PROCEDURE — 71260 CT THORAX DX C+: CPT

## 2019-11-06 PROCEDURE — 34300033 ZZH RX 343: Performed by: INTERNAL MEDICINE

## 2019-11-06 PROCEDURE — 78306 BONE IMAGING WHOLE BODY: CPT

## 2019-11-06 PROCEDURE — 74177 CT ABD & PELVIS W/CONTRAST: CPT

## 2019-11-06 PROCEDURE — 25000125 ZZHC RX 250: Performed by: INTERNAL MEDICINE

## 2019-11-06 PROCEDURE — 25000128 H RX IP 250 OP 636: Performed by: INTERNAL MEDICINE

## 2019-11-06 PROCEDURE — A9503 TC99M MEDRONATE: HCPCS | Performed by: INTERNAL MEDICINE

## 2019-11-06 RX ORDER — IOPAMIDOL 755 MG/ML
135 INJECTION, SOLUTION INTRAVASCULAR ONCE
Status: COMPLETED | OUTPATIENT
Start: 2019-11-06 | End: 2019-11-06

## 2019-11-06 RX ORDER — TC 99M MEDRONATE 20 MG/10ML
25 INJECTION, POWDER, LYOPHILIZED, FOR SOLUTION INTRAVENOUS ONCE
Status: COMPLETED | OUTPATIENT
Start: 2019-11-06 | End: 2019-11-06

## 2019-11-06 RX ADMIN — SODIUM CHLORIDE 79 ML: 9 INJECTION, SOLUTION INTRAVENOUS at 12:10

## 2019-11-06 RX ADMIN — TC 99M MEDRONATE 26.8 MCI.: 20 INJECTION, POWDER, LYOPHILIZED, FOR SOLUTION INTRAVENOUS at 11:45

## 2019-11-06 RX ADMIN — IOPAMIDOL 135 ML: 755 INJECTION, SOLUTION INTRAVENOUS at 12:10

## 2019-11-20 ENCOUNTER — APPOINTMENT (OUTPATIENT)
Dept: LAB | Facility: CLINIC | Age: 69
End: 2019-11-20
Attending: INTERNAL MEDICINE
Payer: COMMERCIAL

## 2019-11-20 ENCOUNTER — ONCOLOGY VISIT (OUTPATIENT)
Dept: ONCOLOGY | Facility: CLINIC | Age: 69
End: 2019-11-20
Attending: INTERNAL MEDICINE
Payer: COMMERCIAL

## 2019-11-20 DIAGNOSIS — C61 MALIGNANT NEOPLASM OF PROSTATE (H): Primary | ICD-10-CM

## 2019-11-20 LAB
ALBUMIN SERPL-MCNC: 4 G/DL (ref 3.4–5)
ALP SERPL-CCNC: 70 U/L (ref 40–150)
ALT SERPL W P-5'-P-CCNC: 30 U/L (ref 0–70)
ANION GAP SERPL CALCULATED.3IONS-SCNC: 5 MMOL/L (ref 3–14)
AST SERPL W P-5'-P-CCNC: 23 U/L (ref 0–45)
BASOPHILS # BLD AUTO: 0 10E9/L (ref 0–0.2)
BASOPHILS NFR BLD AUTO: 0.5 %
BILIRUB SERPL-MCNC: 0.7 MG/DL (ref 0.2–1.3)
BUN SERPL-MCNC: 25 MG/DL (ref 7–30)
CALCIUM SERPL-MCNC: 9.6 MG/DL (ref 8.5–10.1)
CHLORIDE SERPL-SCNC: 105 MMOL/L (ref 94–109)
CO2 SERPL-SCNC: 26 MMOL/L (ref 20–32)
CREAT SERPL-MCNC: 0.94 MG/DL (ref 0.66–1.25)
DIFFERENTIAL METHOD BLD: NORMAL
EOSINOPHIL # BLD AUTO: 0.1 10E9/L (ref 0–0.7)
EOSINOPHIL NFR BLD AUTO: 1.5 %
ERYTHROCYTE [DISTWIDTH] IN BLOOD BY AUTOMATED COUNT: 13.2 % (ref 10–15)
GFR SERPL CREATININE-BSD FRML MDRD: 84 ML/MIN/{1.73_M2}
GLUCOSE SERPL-MCNC: 91 MG/DL (ref 70–99)
HCT VFR BLD AUTO: 40.1 % (ref 40–53)
HGB BLD-MCNC: 13.8 G/DL (ref 13.3–17.7)
IMM GRANULOCYTES # BLD: 0 10E9/L (ref 0–0.4)
IMM GRANULOCYTES NFR BLD: 0.2 %
LYMPHOCYTES # BLD AUTO: 1.7 10E9/L (ref 0.8–5.3)
LYMPHOCYTES NFR BLD AUTO: 28.2 %
MCH RBC QN AUTO: 31.2 PG (ref 26.5–33)
MCHC RBC AUTO-ENTMCNC: 34.4 G/DL (ref 31.5–36.5)
MCV RBC AUTO: 91 FL (ref 78–100)
MONOCYTES # BLD AUTO: 0.4 10E9/L (ref 0–1.3)
MONOCYTES NFR BLD AUTO: 6 %
NEUTROPHILS # BLD AUTO: 3.9 10E9/L (ref 1.6–8.3)
NEUTROPHILS NFR BLD AUTO: 63.6 %
NRBC # BLD AUTO: 0 10*3/UL
NRBC BLD AUTO-RTO: 0 /100
PLATELET # BLD AUTO: 166 10E9/L (ref 150–450)
POTASSIUM SERPL-SCNC: 3.8 MMOL/L (ref 3.4–5.3)
PROT SERPL-MCNC: 7.5 G/DL (ref 6.8–8.8)
PSA SERPL-MCNC: 0.1 UG/L (ref 0–4)
RBC # BLD AUTO: 4.42 10E12/L (ref 4.4–5.9)
SODIUM SERPL-SCNC: 137 MMOL/L (ref 133–144)
WBC # BLD AUTO: 6.2 10E9/L (ref 4–11)

## 2019-11-20 PROCEDURE — G0463 HOSPITAL OUTPT CLINIC VISIT: HCPCS | Mod: ZF

## 2019-11-20 PROCEDURE — 84153 ASSAY OF PSA TOTAL: CPT | Performed by: INTERNAL MEDICINE

## 2019-11-20 PROCEDURE — 25000128 H RX IP 250 OP 636: Mod: ZF | Performed by: INTERNAL MEDICINE

## 2019-11-20 PROCEDURE — 99214 OFFICE O/P EST MOD 30 MIN: CPT | Mod: ZP | Performed by: INTERNAL MEDICINE

## 2019-11-20 PROCEDURE — 84403 ASSAY OF TOTAL TESTOSTERONE: CPT | Performed by: INTERNAL MEDICINE

## 2019-11-20 PROCEDURE — 96402 CHEMO HORMON ANTINEOPL SQ/IM: CPT

## 2019-11-20 PROCEDURE — 86316 IMMUNOASSAY TUMOR OTHER: CPT | Performed by: INTERNAL MEDICINE

## 2019-11-20 PROCEDURE — 85025 COMPLETE CBC W/AUTO DIFF WBC: CPT | Performed by: INTERNAL MEDICINE

## 2019-11-20 PROCEDURE — 80053 COMPREHEN METABOLIC PANEL: CPT | Performed by: INTERNAL MEDICINE

## 2019-11-20 PROCEDURE — 36415 COLL VENOUS BLD VENIPUNCTURE: CPT

## 2019-11-20 RX ADMIN — LEUPROLIDE ACETATE 22.5 MG: KIT at 18:07

## 2019-11-20 ASSESSMENT — PAIN SCALES - GENERAL: PAINLEVEL: NO PAIN (0)

## 2019-11-20 ASSESSMENT — MIFFLIN-ST. JEOR: SCORE: 2158.37

## 2019-11-20 NOTE — NURSING NOTE
"Oncology Rooming Note    November 20, 2019 5:06 PM   Keenan Sprague is a 69 year old male who presents for:    Chief Complaint   Patient presents with     Oncology Clinic Visit     Return; Prostate Ca     Blood Draw     labs drawn with vpt by rn.  vs taken     Initial Vitals: /73 (BP Location: Right arm, Patient Position: Sitting, Cuff Size: Adult Large)   Pulse 67   Temp 99.3  F (37.4  C) (Oral)   Resp 16   Wt 131.4 kg (289 lb 9.6 oz)   SpO2 97%   BMI 37.18 kg/m   Estimated body mass index is 37.18 kg/m  as calculated from the following:    Height as of 9/10/19: 1.88 m (6' 2\").    Weight as of this encounter: 131.4 kg (289 lb 9.6 oz). Body surface area is 2.62 meters squared.  No Pain (0) Comment: Data Unavailable   No LMP for male patient.  Allergies reviewed: Yes  Medications reviewed: Yes    Medications: Medication refills not needed today.  Pharmacy name entered into [facility]:    [facility] - [place], [place] - [address]  [facility]  [facility] - [place] - [address]  [id number] - [place], [place] - [address]  [facility] [place] - [place], [place] - [address] [address]    Clinical concerns: CT and bone scan results        Uzma Gonzalez CMA              "
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
## 2019-11-20 NOTE — NURSING NOTE
Chief Complaint   Patient presents with     Oncology Clinic Visit     Return; Prostate Ca     Blood Draw     labs drawn with vpt by rn.  vs taken     Labs drawn with vpt by rn.  Pt tolerated well.  VS taken.  Pt checked in for next appt.    Lashell Portillo RN
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
## 2019-11-21 NOTE — NURSING NOTE
Lupron Depot was administered intramuscular in left ventrogluteal. Patient tolerated injection with no incident. See MAR.     Nikki Deluna MA, November 20, 2019 6:23 PM

## 2019-11-22 LAB — TESTOST SERPL-MCNC: <2 NG/DL (ref 240–950)

## 2019-11-23 LAB — CGA SERPL-MCNC: 92 NG/ML (ref 0–160)

## 2019-11-25 DIAGNOSIS — C61 MALIGNANT NEOPLASM OF PROSTATE (H): Primary | ICD-10-CM

## 2019-11-25 RX ORDER — ABIRATERONE ACETATE 250 MG/1
1000 TABLET ORAL DAILY
Qty: 120 TABLET | Refills: 0 | Status: SHIPPED | OUTPATIENT
Start: 2019-11-25 | End: 2020-02-20

## 2019-11-26 ENCOUNTER — TELEPHONE (OUTPATIENT)
Dept: ONCOLOGY | Facility: CLINIC | Age: 69
End: 2019-11-26

## 2019-11-26 NOTE — ORAL ONC MGMT
Oral Chemotherapy Monitoring Program     Placed call in follow up of oral chemotherapy requesting call back. No patient names or medication names mentioned.         Thank you for the opportunity to participate in the care of the above patient,  Zhen Castillo, PharmD  Hematology/Oncology Clinical Pharmacist  [facility]  [facility]  [phone]

## 2019-11-26 NOTE — ORAL ONC MGMT
Oral Chemotherapy Monitoring Program    Primary Oncologist: Dr. Davis  Primary Oncology Clinic: [facility]  Cancer Diagnosis: Prostate Cancer     Therapy History:  4/19/19: start abiraterone 1000 mg ([id number]) daily (with prednisone 5 mg daily)  Next cycle 12/5     Drug Interaction Assessment: no new drug interactions or new medications.      Lab Monitoring Plan  CMP and CBC monthly    Subjective/Objective:  Keenan Sprague is a 69 year old male contacted by phone for a follow-up visit for oral chemotherapy.  Nathan continues to tolerate abiraterone and prednisone well. He denies any adverse effects or missed doses of medications. No medication changes. He confirmed taking correct abiraterone dose 4 x 250mg tablets. He is due to start his next cycle on 12/5.     ORAL CHEMOTHERAPY 4/3/2019 4/15/2019 5/15/2019 7/30/2019 8/29/2019 9/26/2019 11/26/2019   Drug Name Zytiga (Abiraterone) Zytiga (Abiraterone) Zytiga (Abiraterone) Zytiga (Abiraterone) Zytiga (Abiraterone) Zytiga (Abiraterone) Zytiga (Abiraterone)   Current Dosage 1000mg 1000mg 1000mg 1000mg 1000mg 1000mg 1000mg   Current Schedule Daily Daily Daily Daily Daily Daily Daily   Cycle Details Continuous Continuous Continuous Continuous Continuous Continuous Continuous   Start Date of Last Cycle - 4/9/2019 - 7/8/2019 8/7/2019 9/6/2019 11/5/2019   Planned next cycle start date - - - 8/7/2019 9/6/2019 10/6/2019 12/5/2019   Doses missed in last 2 weeks - - 0 0 0 0 0   Adherence Assessment - Adherent Adherent Adherent Adherent Adherent Adherent   Adverse Effects - No AE identified during assessment No AE identified during assessment Other (see note for details) Other (see note for details) No AE identified during assessment No AE identified during assessment   Other (see note for details) - - - hot flashes hot flashes - -   Pharmacist intervention? - - - Yes No - -   Intervention(s) - - - Referral to oncology provider - - -   Any new drug interactions? No No No No  "No No No   Is the dose as ordered appropriate for the patient? Yes Yes - Yes Yes Yes Yes   Has the patient been assessed within the past 6 months for depression? - - - Yes Yes - -   Has the patient missed any days of school, work, or other routine activity? - - - No No No No       Last PHQ-2 Score on record:   PHQ-2 ( 1999 [employer]) 3/5/2019 12/19/2018   Q1: Little interest or pleasure in doing things 0 0   Q2: Feeling down, depressed or hopeless 0 0   PHQ-2 Score 0 0       Patient does not report depression symptoms.      Vitals:  BP:   BP Readings from Last 1 Encounters:   11/20/19 137/73     Wt Readings from Last 1 Encounters:   11/20/19 131.4 kg (289 lb 9.6 oz)     Estimated body surface area is 2.62 meters squared as calculated from the following:    Height as of 9/10/19: 1.88 m (6' 2\").    Weight as of 11/20/19: 131.4 kg (289 lb 9.6 oz).    Labs:  _  Result Component Current Result Ref Range   Sodium 137 (11/20/2019) 133 - 144 mmol/L     _  Result Component Current Result Ref Range   Potassium 3.8 (11/20/2019) 3.4 - 5.3 mmol/L     _  Result Component Current Result Ref Range   Calcium 9.6 (11/20/2019) 8.5 - 10.1 mg/dL     No results found for Mag within last 30 days.     No results found for Phos within last 30 days.     _  Result Component Current Result Ref Range   Albumin 4.0 (11/20/2019) 3.4 - 5.0 g/dL     _  Result Component Current Result Ref Range   Urea Nitrogen 25 (11/20/2019) 7 - 30 mg/dL     _  Result Component Current Result Ref Range   Creatinine 0.94 (11/20/2019) 0.66 - 1.25 mg/dL       _  Result Component Current Result Ref Range   AST 23 (11/20/2019) 0 - 45 U/L     _  Result Component Current Result Ref Range   ALT 30 (11/20/2019) 0 - 70 U/L     _  Result Component Current Result Ref Range   Bilirubin Total 0.7 (11/20/2019) 0.2 - 1.3 mg/dL       _  Result Component Current Result Ref Range   WBC 6.2 (11/20/2019) 4.0 - 11.0 10e9/L     _  Result Component Current Result Ref Range   Hemoglobin 13.8 " (11/20/2019) 13.3 - 17.7 g/dL     _  Result Component Current Result Ref Range   Platelet Count 166 (11/20/2019) 150 - 450 10e9/L     _  Result Component Current Result Ref Range   Absolute Neutrophil 3.9 (11/20/2019) 1.6 - 8.3 10e9/L       Assessment:  Nathan continues to tolerate abiraterone and prednisone well.     Plan:  Continue current therapy.     Follow-Up:  1/8/2020: labs and [facility] appointment     Refill Due:  [facility] will fill abiraterone and prednisone for  11/29 at site [address] [place] [address] pharmacy.      Thank you for the opportunity to participate in the care of the above patient,  Zhen Castillo, PharmD  Hematology/Oncology Clinical Pharmacist  [facility]  [facility]  [phone]

## 2019-12-10 VITALS
HEIGHT: 74 IN | SYSTOLIC BLOOD PRESSURE: 137 MMHG | RESPIRATION RATE: 16 BRPM | BODY MASS INDEX: 37.17 KG/M2 | TEMPERATURE: 99.3 F | WEIGHT: 289.6 LBS | HEART RATE: 67 BPM | OXYGEN SATURATION: 97 % | DIASTOLIC BLOOD PRESSURE: 73 MMHG

## 2019-12-22 ENCOUNTER — TELEPHONE (OUTPATIENT)
Dept: NURSING | Facility: CLINIC | Age: 69
End: 2019-12-22

## 2019-12-22 ENCOUNTER — HOSPITAL ENCOUNTER (EMERGENCY)
Facility: CLINIC | Age: 69
Discharge: HOME | End: 2019-12-22
Attending: EMERGENCY MEDICINE | Admitting: EMERGENCY MEDICINE
Payer: COMMERCIAL

## 2019-12-22 VITALS
OXYGEN SATURATION: 100 % | HEART RATE: 72 BPM | BODY MASS INDEX: 37.19 KG/M2 | TEMPERATURE: 97.1 F | DIASTOLIC BLOOD PRESSURE: 84 MMHG | RESPIRATION RATE: 22 BRPM | SYSTOLIC BLOOD PRESSURE: 164 MMHG | WEIGHT: 289.68 LBS

## 2019-12-22 DIAGNOSIS — L03.211 FACIAL CELLULITIS: ICD-10-CM

## 2019-12-22 DIAGNOSIS — K04.7 DENTAL INFECTION: ICD-10-CM

## 2019-12-22 PROCEDURE — 25000128 H RX IP 250 OP 636: Performed by: EMERGENCY MEDICINE

## 2019-12-22 PROCEDURE — 99284 EMERGENCY DEPT VISIT MOD MDM: CPT | Mod: 25

## 2019-12-22 PROCEDURE — 25000132 ZZH RX MED GY IP 250 OP 250 PS 637: Performed by: EMERGENCY MEDICINE

## 2019-12-22 PROCEDURE — 96365 THER/PROPH/DIAG IV INF INIT: CPT | Mod: 59

## 2019-12-22 PROCEDURE — 40800 DRAINAGE OF MOUTH LESION: CPT

## 2019-12-22 RX ORDER — LIDOCAINE HYDROCHLORIDE AND EPINEPHRINE 10; 10 MG/ML; UG/ML
INJECTION, SOLUTION INFILTRATION; PERINEURAL
Status: DISCONTINUED
Start: 2019-12-22 | End: 2019-12-22 | Stop reason: HOSPADM

## 2019-12-22 RX ORDER — OXYCODONE HYDROCHLORIDE 5 MG/1
10 TABLET ORAL ONCE
Status: COMPLETED | OUTPATIENT
Start: 2019-12-22 | End: 2019-12-22

## 2019-12-22 RX ORDER — OXYCODONE HYDROCHLORIDE 5 MG/1
5 TABLET ORAL EVERY 6 HOURS PRN
Qty: 10 TABLET | Refills: 0 | Status: SHIPPED | OUTPATIENT
Start: 2019-12-22 | End: 2020-07-22

## 2019-12-22 RX ORDER — AMPICILLIN AND SULBACTAM 2; 1 G/1; G/1
3 INJECTION, POWDER, FOR SOLUTION INTRAMUSCULAR; INTRAVENOUS ONCE
Status: COMPLETED | OUTPATIENT
Start: 2019-12-22 | End: 2019-12-22

## 2019-12-22 RX ADMIN — AMPICILLIN SODIUM AND SULBACTAM SODIUM 3 G: 2; 1 INJECTION, POWDER, FOR SOLUTION INTRAMUSCULAR; INTRAVENOUS at 15:53

## 2019-12-22 RX ADMIN — OXYCODONE HYDROCHLORIDE 10 MG: 5 TABLET ORAL at 15:24

## 2019-12-22 ASSESSMENT — ENCOUNTER SYMPTOMS
FEVER: 0
FACIAL SWELLING: 1

## 2019-12-22 NOTE — ED TRIAGE NOTES
Left upper dental pain, facial swelling.  Was seen by dentist yesterday, started abx.  Worse pain, called dentist, was told to go to  for IV antibiotics.  ABCDs intact.

## 2019-12-22 NOTE — ED PROVIDER NOTES
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 The patient reports that yesterday morning he started to experience left upper dental pain and selling, was seen by his dentist, had x rays done, and was started on amoxicillin 500 mg three times per day and Tylenol for pain control. The patient notes that his pain and swelling have continued to persist, prompting him to the ED. The patient states that he was not able to sleep last night due to his pain level. The patient denies fever and other issues.      Allergies:  Ibuprofen      Medications:    Zytiga  Ditropan  Compazine  Xarelto     Past Medical History:    Lipomatosis  Prostate cancer  PE and DVT  Colon polyps     Past Surgical History:    Tonsillectomy   Lipoma     Family History:    Cancer - Father    Social History:  Smoking status: Former Smoker, Quit Date: 2/27/19  Alcohol use: Yes  Drug use: No  Marital Status:   [2]     Review of Systems   Constitutional: Negative for fever.   HENT: Positive for dental problem and facial swelling.    All other systems reviewed and are negative.      Physical Exam     Patient Vitals for the past 24 hrs:   BP Temp Temp src Pulse Heart Rate Resp SpO2 Weight   12/22/19 1650 (!) 164/84 -- -- 72 72 -- -- --   12/22/19 1529 -- -- -- -- -- -- -- 131.4 kg (289 lb 11 oz)   12/22/19 1436 (!) 192/88 97.1  F (36.2  C) Temporal 72 -- 22 100 % --        Physical Exam  Vital signs and nursing notes reviewed.     Constitutional: laying on priscilla appears uncomfortable  HENT: No evidence of facial or head injury.  Swelling at the gingiva at the base of the 14th and 15th tooth. No fluctuance noted. Induration of the left upper cheek with mild erythema. No mandibular or submandibular swelling,. No trismus. Oropharynx is normal.   Eyes: Conjunctivae are normal  bilaterally. Pupils equal  Neck: normal range of motion  Cardiovascular: Normal rate.    Pulmonary/Chest: No respiratory distress.   Musculoskeletal: Normal ROM  Neurological: Alert and oriented. No focal weakness  Skin: Skin is warm and dry. No rash noted.   Psych: normal affect      Emergency Department Course     Procedures:    Incision and Drainage     LOCATIONS:  Tooth 14, Left Upper Jaw     ANESTHESIA:  Local apical block using Lidocaine 1% with Epinephrine, total of 2 mLs     PREPARATION:  Cleansed with Techni-Care     PROCEDURE:  Area was incised with # 11 Blade ([place]) with a Single Straight incision.  Wound treatment included blood return and no purulent drainage.  Packing consisted of No Packing.  Appropriate dressing was applied to cover the area.    PATIENT STATUS: Patient tolerated the procedure well. There were no complications.    Interventions:  1524, Roxicodone, 10 mg Tablet, PO  1553, Unasyn, 3 g, IV    Emergency Department Course:  Past medical records, nursing notes, and vitals reviewed.  1510: I performed an exam of the patient and obtained history, as documented above.     IV inserted.    1555: I rechecked the patient. Explained findings to patient. I&D procedure performed.     Findings and plan explained to the Patient. Patient discharged home with instructions regarding supportive care, medications, and reasons to return. The importance of close follow-up was reviewed. The patient was prescribed Roxicodone and Augmentin.       Impression & Plan    Medical Decision Making:  Keenan Sprague is a 69 year old male who presents with increased pain and swelling within his left upper cheek. He was recently started on amoxicillin for probable dental infection and has an appointment for his orthodontist tomorrow morning. They recommended him of giving a dose of IV antibiotics prior to their evaluation tomorrow. He was given a dose of Unasyn here in the ED. He does have findings of likely  developing dental abscess with a left cheek cellulitis. He has no submandibular swelling or oral swelling. I attempted incision and drainage at the site of most edema, but there was no significant fluctuants or purulent drainage. This with consistent more with an induration and cellulitic process. He will follow up closely with his dentist tomorrow. Return if any worsening. Patient understands the plan and all questions were answered.     Diagnosis:    ICD-10-CM    1. Dental infection K04.7    2. Facial cellulitis L03.211        Disposition:  Discharged to home with Roxicodone and Augmentin.    Discharge Medications:  Discharge Medication List as of 12/22/2019  4:45 PM      START taking these medications    Details   amoxicillin-clavulanate (AUGMENTIN) 875-125 MG tablet Take 1 tablet by mouth 2 times daily for 10 days, Disp-20 tablet, R-0, Local Print      oxyCODONE (ROXICODONE) 5 MG tablet Take 1 tablet (5 mg) by mouth every 6 hours as needed for pain, Disp-10 tablet, R-0, Local Print           Scribe Disclosure:  I, Keenan Alvarado, am serving as a scribe at 3:09 PM on 12/22/2019 to document services personally performed by Eusebio Noguera MD based on my observations and the provider's statements to me.      Keenan Alvarado  12/22/2019   [facility] EMERGENCY DEPARTMENT       Eusebio Noguera MD  12/23/19 0002

## 2019-12-22 NOTE — TELEPHONE ENCOUNTER
70 y/o male was seen by dentist, given 500mg amoxicillin 3x a day, swelling getting worse and pain increasing, RN discussed ER options but recommended he call his dentist even at this late hour and wait for after hours service to come on and have the on-call dentist paged for next steps, advice. He chooses this in place of additional triage, although callback if he does not get a hold of anyone.      Nikki Cui RN - [place] Nurse Advisor  12/22/2019  7:33AM

## 2019-12-22 NOTE — ED AVS SNAPSHOT
[facility] Emergency Department  [address]  [facility] [address]  Phone:  [phone]  Fax:  [phone]                                    Keenan Sprague   MRN: [id number]    Department:  [facility] Emergency Department   Date of Visit:  12/22/2019           After Visit Summary Signature Page    I have received my discharge instructions, and my questions have been answered. I have discussed any challenges I see with this plan with the nurse or doctor.    ..........................................................................................................................................  Patient/Patient Representative Signature      ..........................................................................................................................................  Patient Representative Print Name and Relationship to Patient    ..................................................               ................................................  Date                                   Time    ..........................................................................................................................................  Reviewed by Signature/Title    ...................................................              ..............................................  Date                                               Time          22EPIC Rev 08/18

## 2019-12-23 DIAGNOSIS — C61 MALIGNANT NEOPLASM OF PROSTATE (H): Primary | ICD-10-CM

## 2019-12-23 RX ORDER — ABIRATERONE ACETATE 250 MG/1
1000 TABLET ORAL DAILY
Qty: 120 TABLET | Refills: 0 | Status: CANCELLED | OUTPATIENT
Start: 2019-12-24

## 2019-12-23 RX ORDER — PREDNISONE 5 MG/1
5 TABLET ORAL DAILY
Qty: 30 TABLET | Refills: 0 | Status: CANCELLED | OUTPATIENT
Start: 2019-12-24

## 2019-12-23 RX ORDER — PREDNISONE 5 MG/1
5 TABLET ORAL DAILY
Qty: 30 TABLET | Refills: 0 | Status: SHIPPED | OUTPATIENT
Start: 2019-12-23 | End: 2020-03-11

## 2019-12-23 RX ORDER — ABIRATERONE ACETATE 250 MG/1
1000 TABLET ORAL DAILY
Qty: 120 TABLET | Refills: 0 | Status: SHIPPED | OUTPATIENT
Start: 2019-12-23 | End: 2020-02-20

## 2019-12-26 ENCOUNTER — TELEPHONE (OUTPATIENT)
Dept: ONCOLOGY | Facility: CLINIC | Age: 69
End: 2019-12-26

## 2019-12-26 NOTE — ORAL ONC MGMT
Oral Chemotherapy Monitoring Program    Primary Oncologist: Dr. Davis  Primary Oncology Clinic: [facility]  Cancer Diagnosis: Prostate Cancer     Therapy History:  4/19/19: start abiraterone 1000 mg ([id number]) daily (with prednisone 5 mg daily)  Next cycle 12/5/2019, 1/4/2020     Drug Interaction Assessment: no new drug interactions (new medication amoxicillin-clavulanate)      Lab Monitoring Plan  CMP and CBC monthly     Subjective/Objective:  Keenan Sprague is a 69 year old male contacted by phone for a follow-up visit for oral chemotherapy.  Nathan continues to tolerate abiraterone and prednisone well. He denies any adverse effects or missed doses of medications. He confirmed taking correct abiraterone dose 4 x 250mg tablets. He is due to start his next cycle on 1/4 and will  on 12/28 at site [facility] ([facility] pharmacy). He had a dental abscess and started ten-days of Augmentin on 12/23. No other medication changes.  Questions answered to his stated satisfaction.     ORAL CHEMOTHERAPY 4/15/2019 5/15/2019 7/30/2019 8/29/2019 9/26/2019 11/26/2019 12/26/2019   Drug Name Zytiga (Abiraterone) Zytiga (Abiraterone) Zytiga (Abiraterone) Zytiga (Abiraterone) Zytiga (Abiraterone) Zytiga (Abiraterone) Zytiga (Abiraterone)   Current Dosage 1000mg 1000mg 1000mg 1000mg 1000mg 1000mg 1000mg   Current Schedule Daily Daily Daily Daily Daily Daily Daily   Cycle Details Continuous Continuous Continuous Continuous Continuous Continuous Continuous   Start Date of Last Cycle 4/9/2019 - 7/8/2019 8/7/2019 9/6/2019 11/5/2019 12/5/2019   Planned next cycle start date - - 8/7/2019 9/6/2019 10/6/2019 12/5/2019 1/4/2019   Doses missed in last 2 weeks - 0 0 0 0 0 0   Adherence Assessment Adherent Adherent Adherent Adherent Adherent Adherent Adherent   Adverse Effects No AE identified during assessment No AE identified during assessment Other (see note for details) Other (see note for details) No AE identified during assessment No AE  "identified during assessment No AE identified during assessment   Other (see note for details) - - hot flashes hot flashes - - -   Pharmacist intervention? - - Yes No - - -   Intervention(s) - - Referral to oncology provider - - - -   Any new drug interactions? No No No No No No No   Is the dose as ordered appropriate for the patient? Yes - Yes Yes Yes Yes Yes   Has the patient been assessed within the past 6 months for depression? - - Yes Yes - - -   Has the patient missed any days of school, work, or other routine activity? - - No No No No No       Last PHQ-2 Score on record:   PHQ-2 ( 1999 Pfizer) 3/5/2019 12/19/2018   Q1: Little interest or pleasure in doing things 0 0   Q2: Feeling down, depressed or hopeless 0 0   PHQ-2 Score 0 0       Patient does not report depression symptoms.      Vitals:  BP:   BP Readings from Last 1 Encounters:   12/22/19 (!) 164/84     Wt Readings from Last 1 Encounters:   12/22/19 131.4 kg (289 lb 11 oz)     Estimated body surface area is 2.62 meters squared as calculated from the following:    Height as of 11/20/19: 1.88 m (6' 2\").    Weight as of 12/22/19: 131.4 kg (289 lb 11 oz).    Labs:  No results found for NA within last 30 days.     No results found for K within last 30 days.     No results found for CA within last 30 days.     No results found for Mag within last 30 days.     No results found for Phos within last 30 days.     No results found for ALBUMIN within last 30 days.     No results found for BUN within last 30 days.     No results found for CR within last 30 days.       No results found for AST within last 30 days.     No results found for ALT within last 30 days.     No results found for BILITOTAL within last 30 days.       No results found for WBC within last 30 days.     No results found for HGB within last 30 days.     No results found for PLT within last 30 days.     No results found for ANC within last 30 days.       Assessment:  Nathan is tolerating abiraterone " and prednisone well without and new side effects.     Plan:  Continue plan.    Follow-Up:  1/8/2020: Dr. Davis appointment and labs.     Refill Due:  [facility] will fill abiraterone and prednisone for  12/28 at site [address] [place] [address] pharmacy.      Thank you for the opportunity to participate in the care of the above patient,  Zhen Castillo, PharmD  Hematology/Oncology Clinical Pharmacist  [facility]  [facility]  [phone]

## 2020-01-07 ENCOUNTER — PRE VISIT (OUTPATIENT)
Dept: UROLOGY | Facility: CLINIC | Age: 70
End: 2020-01-07

## 2020-01-07 NOTE — TELEPHONE ENCOUNTER
Chief Complaint : haematuria cysto     Records/Orders: NA    Pt Contacted:no     At Rooming: urine

## 2020-01-08 ENCOUNTER — ONCOLOGY VISIT (OUTPATIENT)
Dept: ONCOLOGY | Facility: CLINIC | Age: 70
End: 2020-01-08
Attending: INTERNAL MEDICINE
Payer: COMMERCIAL

## 2020-01-08 ENCOUNTER — APPOINTMENT (OUTPATIENT)
Dept: LAB | Facility: CLINIC | Age: 70
End: 2020-01-08
Attending: INTERNAL MEDICINE
Payer: COMMERCIAL

## 2020-01-08 VITALS
HEART RATE: 59 BPM | BODY MASS INDEX: 38.39 KG/M2 | OXYGEN SATURATION: 97 % | SYSTOLIC BLOOD PRESSURE: 130 MMHG | TEMPERATURE: 98.6 F | DIASTOLIC BLOOD PRESSURE: 70 MMHG | WEIGHT: 299 LBS | RESPIRATION RATE: 16 BRPM

## 2020-01-08 DIAGNOSIS — Z79.899 ENCOUNTER FOR LONG-TERM (CURRENT) USE OF MEDICATIONS: ICD-10-CM

## 2020-01-08 DIAGNOSIS — C61 MALIGNANT NEOPLASM OF PROSTATE (H): ICD-10-CM

## 2020-01-08 LAB
ALBUMIN SERPL-MCNC: 3.6 G/DL (ref 3.4–5)
ALP SERPL-CCNC: 72 U/L (ref 40–150)
ALT SERPL W P-5'-P-CCNC: 26 U/L (ref 0–70)
ANION GAP SERPL CALCULATED.3IONS-SCNC: 4 MMOL/L (ref 3–14)
AST SERPL W P-5'-P-CCNC: 20 U/L (ref 0–45)
BASOPHILS # BLD AUTO: 0 10E9/L (ref 0–0.2)
BASOPHILS NFR BLD AUTO: 0.6 %
BILIRUB SERPL-MCNC: 0.4 MG/DL (ref 0.2–1.3)
BUN SERPL-MCNC: 24 MG/DL (ref 7–30)
CALCIUM SERPL-MCNC: 9 MG/DL (ref 8.5–10.1)
CHLORIDE SERPL-SCNC: 107 MMOL/L (ref 94–109)
CHOLEST SERPL-MCNC: 199 MG/DL
CO2 SERPL-SCNC: 28 MMOL/L (ref 20–32)
CREAT SERPL-MCNC: 0.86 MG/DL (ref 0.66–1.25)
DIFFERENTIAL METHOD BLD: ABNORMAL
EOSINOPHIL # BLD AUTO: 0.1 10E9/L (ref 0–0.7)
EOSINOPHIL NFR BLD AUTO: 2.1 %
ERYTHROCYTE [DISTWIDTH] IN BLOOD BY AUTOMATED COUNT: 13.3 % (ref 10–15)
GFR SERPL CREATININE-BSD FRML MDRD: 89 ML/MIN/{1.73_M2}
GLUCOSE SERPL-MCNC: 79 MG/DL (ref 70–99)
HCT VFR BLD AUTO: 38.3 % (ref 40–53)
HDLC SERPL-MCNC: 54 MG/DL
HGB BLD-MCNC: 13 G/DL (ref 13.3–17.7)
IMM GRANULOCYTES # BLD: 0 10E9/L (ref 0–0.4)
IMM GRANULOCYTES NFR BLD: 0.5 %
LDLC SERPL CALC-MCNC: 119 MG/DL
LYMPHOCYTES # BLD AUTO: 1.6 10E9/L (ref 0.8–5.3)
LYMPHOCYTES NFR BLD AUTO: 25.4 %
MCH RBC QN AUTO: 31.3 PG (ref 26.5–33)
MCHC RBC AUTO-ENTMCNC: 33.9 G/DL (ref 31.5–36.5)
MCV RBC AUTO: 92 FL (ref 78–100)
MONOCYTES # BLD AUTO: 0.5 10E9/L (ref 0–1.3)
MONOCYTES NFR BLD AUTO: 7.2 %
NEUTROPHILS # BLD AUTO: 4 10E9/L (ref 1.6–8.3)
NEUTROPHILS NFR BLD AUTO: 64.2 %
NONHDLC SERPL-MCNC: 145 MG/DL
NRBC # BLD AUTO: 0 10*3/UL
NRBC BLD AUTO-RTO: 0 /100
PLATELET # BLD AUTO: 166 10E9/L (ref 150–450)
POTASSIUM SERPL-SCNC: 4.1 MMOL/L (ref 3.4–5.3)
PROT SERPL-MCNC: 7.1 G/DL (ref 6.8–8.8)
PSA SERPL-MCNC: 0.05 UG/L (ref 0–4)
RBC # BLD AUTO: 4.16 10E12/L (ref 4.4–5.9)
SODIUM SERPL-SCNC: 138 MMOL/L (ref 133–144)
TRIGL SERPL-MCNC: 128 MG/DL
WBC # BLD AUTO: 6.3 10E9/L (ref 4–11)

## 2020-01-08 PROCEDURE — 85025 COMPLETE CBC W/AUTO DIFF WBC: CPT | Performed by: INTERNAL MEDICINE

## 2020-01-08 PROCEDURE — 80061 LIPID PANEL: CPT | Performed by: INTERNAL MEDICINE

## 2020-01-08 PROCEDURE — 84403 ASSAY OF TOTAL TESTOSTERONE: CPT | Performed by: INTERNAL MEDICINE

## 2020-01-08 PROCEDURE — 86316 IMMUNOASSAY TUMOR OTHER: CPT | Performed by: INTERNAL MEDICINE

## 2020-01-08 PROCEDURE — G0463 HOSPITAL OUTPT CLINIC VISIT: HCPCS | Mod: ZF

## 2020-01-08 PROCEDURE — 36415 COLL VENOUS BLD VENIPUNCTURE: CPT

## 2020-01-08 PROCEDURE — 99215 OFFICE O/P EST HI 40 MIN: CPT | Mod: ZP | Performed by: INTERNAL MEDICINE

## 2020-01-08 PROCEDURE — 80053 COMPREHEN METABOLIC PANEL: CPT | Performed by: INTERNAL MEDICINE

## 2020-01-08 PROCEDURE — 84153 ASSAY OF PSA TOTAL: CPT | Performed by: INTERNAL MEDICINE
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
## 2020-01-08 NOTE — NURSING NOTE
Chief Complaint   Patient presents with     Blood Draw     Labs drawn via  by RN in lab. VS taken.     Eliseo Toney RN
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
## 2020-01-10 LAB — TESTOST SERPL-MCNC: <2 NG/DL (ref 240–950)

## 2020-01-12 LAB — CGA SERPL-MCNC: 108 NG/ML (ref 0–160)

## 2020-01-21 ENCOUNTER — DOCUMENTATION ONLY (OUTPATIENT)
Dept: OTHER | Facility: CLINIC | Age: 70
End: 2020-01-21

## 2020-01-21 DIAGNOSIS — C61 MALIGNANT NEOPLASM OF PROSTATE (H): Primary | ICD-10-CM

## 2020-01-21 RX ORDER — PREDNISONE 5 MG/1
5 TABLET ORAL DAILY
Qty: 30 TABLET | Refills: 0 | Status: SHIPPED | OUTPATIENT
Start: 2020-01-21 | End: 2020-03-11

## 2020-01-21 RX ORDER — ABIRATERONE ACETATE 250 MG/1
1000 TABLET ORAL DAILY
Qty: 120 TABLET | Refills: 0 | Status: SHIPPED | OUTPATIENT
Start: 2020-01-21 | End: 2020-02-20

## 2020-01-23 ENCOUNTER — TELEPHONE (OUTPATIENT)
Dept: UROLOGY | Facility: CLINIC | Age: 70
End: 2020-01-23

## 2020-01-23 ENCOUNTER — OFFICE VISIT (OUTPATIENT)
Dept: UROLOGY | Facility: CLINIC | Age: 70
End: 2020-01-23
Payer: COMMERCIAL

## 2020-01-23 ENCOUNTER — TELEPHONE (OUTPATIENT)
Dept: ONCOLOGY | Facility: CLINIC | Age: 70
End: 2020-01-23

## 2020-01-23 VITALS
DIASTOLIC BLOOD PRESSURE: 95 MMHG | HEIGHT: 74 IN | HEART RATE: 73 BPM | SYSTOLIC BLOOD PRESSURE: 154 MMHG | WEIGHT: 299 LBS | BODY MASS INDEX: 38.37 KG/M2

## 2020-01-23 DIAGNOSIS — R31.0 GROSS HEMATURIA: Primary | ICD-10-CM

## 2020-01-23 LAB
ALBUMIN UR-MCNC: NEGATIVE MG/DL
APPEARANCE UR: ABNORMAL
BILIRUB UR QL STRIP: NEGATIVE
COLOR UR AUTO: YELLOW
GLUCOSE UR STRIP-MCNC: NEGATIVE MG/DL
HGB UR QL STRIP: NEGATIVE
KETONES UR STRIP-MCNC: NEGATIVE MG/DL
LEUKOCYTE ESTERASE UR QL STRIP: NEGATIVE
MUCOUS THREADS #/AREA URNS LPF: PRESENT /LPF
NITRATE UR QL: NEGATIVE
PH UR STRIP: 5 PH (ref 5–7)
RBC #/AREA URNS AUTO: 1 /HPF (ref 0–2)
SOURCE: ABNORMAL
SP GR UR STRIP: 1.02 (ref 1–1.03)
UROBILINOGEN UR STRIP-MCNC: 0 MG/DL (ref 0–2)
WBC #/AREA URNS AUTO: 2 /HPF (ref 0–5)

## 2020-01-23 RX ORDER — LIDOCAINE HYDROCHLORIDE 20 MG/ML
JELLY TOPICAL ONCE
Status: COMPLETED | OUTPATIENT
Start: 2020-01-23 | End: 2020-01-23

## 2020-01-23 RX ADMIN — LIDOCAINE HYDROCHLORIDE: 20 JELLY TOPICAL at 15:47

## 2020-01-23 ASSESSMENT — PAIN SCALES - GENERAL: PAINLEVEL: NO PAIN (0)

## 2020-01-23 ASSESSMENT — MIFFLIN-ST. JEOR: SCORE: 2201.01

## 2020-01-23 NOTE — PROGRESS NOTES
Urology Clinic    Bernabe Arita MD  Date of Service: 2020     Name: Keenan Sprague  MRN: [id number]  Age: 69 year old  : 1950  Referring provider: Referred Self     Assessment:  History of prostate cancer   Keenan Sprague  is a 69 year old male with a history of GS= 3+4 prostate cancer. He is s/p hormonal therapy and brachytherapy in combination with external beam radiation therapy in . His PSA remained relatively stable until 2019 when it was measured at 17.80. Subsequently he was started on Lupron 22.5mg every 3 months.     Gross hematuria   He also has a recent history of gross hematuria back in 2019 when he presented to the ED with urinary rentention requiring a Alejo catheter. No additional episodes of hematuria since stopping his Xarelto. He was very anxious as he has had traumatic experience with a catheter in the past. Discussed due to his history of radiation, cystoscopy is recommended as there is a possibility of cystitis, progression of his prostate cancer or new bladder tumor. Cystoscopy was normal today. There was normal evidence of radiation in the bladder. Discussed if hematuria begins again we may recommend hyperbaric oxygen.      Plan:  -Follow up PRN.     Attestation:  This patient was seen and evaluated by me, with a scribe taking notes.  I have reviewed the note above and agree.  The physical exam and or any procedures were performed by me and the pertinant details are outlined below.       Bernabe Arita MD  Department of Urology  [facility]    ______________________________________________________________________    HPI  Keenan Sprague is a 69 year old male with a history of GS= 3+4 prostate cancer. He is s/p hormonal therapy and brachytherapy in combination with external beam radiation therapy in . PSA remained undetectable until 2008 when it was 0.17. It remained relatively stable until it elevated to around 0.27 in , 0.82  in 2017 and significantly to 17.80 in 02/2019, 23.3 in 03/2019 and 21.50 in 04/2019. He started Lupron 22.5mg every 3 months in 04/2019. He started Abiraterone 1000mg daily with Prednisone 5 mg every day per [facility] regimen 04/09/2019. He had CT CAP and NM bone scan revealing stable disease with no evidence of disease progression in 07/2019. He has a history of smoking 1/4 pack per day for 35 years but is not currently smoking (1/2 year).     On 08/27/2019 he presented to the ED with gross hematuria (passing blood clots) and urinary retention requiring a wiley catheter. ED determined hematuria was most likely related to his prostate cancer or hemorrhagic cystitis (on Xarelto). Same evening, he presented to the ED again but for dysuria, abdominal pain and discomfort at the tip of his penis with voiding. He was sent home with Oxybutynin, Pyridium, and Urojet to go home with. 08/30 he was seen in the ED again for evaluation of wiley. He presented with clear arnaldo urine and a desired to have his Wiley removed.      He has also recovered from the right shoulder arthroplasty performed in early 01/2019 and is now back at work full-time.       Today reports no addition episodes of hematuria since being off the Xarelto.      Initial PSA at diagnosis: 16.3   1st Biopsy (Date 12/2006) [place] Score was 3 + 4 in the right mid/median prostate affecting 40% of the tissue, right apex affecting 50% of the tissue, right mid/lateral prostate affecting 30% of the tissue and right apex/lateral affecting 30% of the tissue and Yamil Score was 3 + 3  In the right base affecting 90% of the tissue.       Cystoscopy:   After sterile preparation and draping of the patient,  a 17-[place] flexible cystoscope was introduced via the urethra.  It was passed without difficulty into the bladder.  The urethra was open without evidence of stricture.  The ureteral orifices were orthotopic.  The bladder mucosa was evaluated using both antegrade and  "retroflex views and this showed no evidence of any lesions. Bladder with some debris floating around. There were no stones.  No source of hematuria identified.    Review of Systems:   Pertinent items are noted in HPI or as below, remainder of complete ROS is negative.      Physical Exam:   Patient is a 69 year old  male   Vitals: Blood pressure (!) 154/95, pulse 73, height 1.88 m (6' 2\"), weight 135.6 kg (299 lb).  General Appearance Adult: Alert, no acute distress, oriented  HENT: throat/mouth:normal, good dentition  Lungs: no respiratory distress, or pursed lip breathing  Heart: No obvious jugular venous distension present  Abdomen: Body mass index is 38.39 kg/m .  Musculoskeltal: extremities normal  Skin: no visible suspicious lesions or rashes  Neuro: Alert, oriented, speech and mentation normal  Psych: affect and mood normal  Gait: Normal  : deferred    Laboratory:   I reviewed all applicable laboratory and pathology data and went over findings with patient  Significant for     Lab Results   Component Value Date    CR 0.86 01/08/2020    CR 0.94 11/20/2019    CR 0.81 10/16/2019    CR 1.02 08/27/2019    CR 0.89 08/27/2019    CR 0.96 08/14/2019    CR 0.93 07/17/2019    CR 1.01 05/29/2019    CR 0.82 04/03/2019    CR 0.79 03/13/2019       PSA   Date Value Ref Range Status   01/08/2020 0.05 0 - 4 ug/L Final     Comment:     Assay Method:  Chemiluminescence using Siemens Vista analyzer   11/20/2019 0.10 0 - 4 ug/L Final     Comment:     Assay Method:  Chemiluminescence using Siemens Vista analyzer   10/16/2019 0.23 0 - 4 ug/L Final     Comment:     Assay Method:  Chemiluminescence using Siemens Vista analyzer   08/14/2019 1.46 0 - 4 ug/L Final     Comment:     Assay Method:  Chemiluminescence using Siemens Vista analyzer   07/17/2019 2.54 0 - 4 ug/L Final     Comment:     Assay Method:  Chemiluminescence using Siemens Vista analyzer   05/29/2019 5.40 (H) 0 - 4 ug/L Final     Comment:     Assay Method:  " Chemiluminescence using Siemens Vista analyzer   05/01/2019 7.09 (H) 0 - 4 ug/L Final     Comment:     Assay Method:  Chemiluminescence using Siemens Vista analyzer   04/03/2019 21.50 (H) 0 - 4 ug/L Final     Comment:     Assay Method:  Chemiluminescence using Siemens Vista analyzer   03/13/2019 23.30 (H) 0 - 4 ug/L Final     Comment:     Assay Method:  Chemiluminescence using Siemens Vista analyzer   02/27/2019 17.80 (H) 0 - 4 ug/L Final     Comment:     Assay Method:  Chemiluminescence using Siemens Vista analyzer       Results for orders placed or performed during the hospital encounter of 08/27/19   UA with Microscopic   Result Value Ref Range    Color Urine Orange     Appearance Urine Slightly Cloudy     Glucose Urine Unable to assay due to interfering substance (A) NEG^Negative mg/dL    Bilirubin Urine Unable to assay due to interfering substance (A) NEG^Negative    Ketones Urine Unable to assay due to interfering substance (A) NEG^Negative mg/dL    Specific Gravity Urine Unable to assay due to interfering substance 1.003 - 1.035    Blood Urine Unable to assay due to interfering substance (A) NEG^Negative    pH Urine Unable to assay due to interfering substance 5.0 - 7.0 pH    Protein Albumin Urine Unable to assay due to interfering substance (A) NEG^Negative mg/dL    Urobilinogen mg/dL Unable to assay due to interfering substance 0.0 - 2.0 mg/dL    Nitrite Urine Unable to assay due to interfering substance (A) NEG^Negative    Leukocyte Esterase Urine Unable to assay due to interfering substance (A) NEG^Negative    Source Catheterized Urine     WBC Urine 4 0 - 5 /HPF    RBC Urine >182 (H) 0 - 2 /HPF       Imaging:   I personally reviewed all applicable imaging and went over the below findings with patient.    Results for orders placed or performed during the hospital encounter of 11/06/19   CT Chest/Abdomen/Pelvis w Contrast    Narrative    CT CHEST/ABDOMEN/PELVIS WITH CONTRAST November 6, 2019 at 1218  hours    HISTORY: 69-year-old patient with oligomenorrhea metastatic hormone  sensitive prostate cancer with treatment. Restaging exam.    COMPARISON: July 24, 2019.    TECHNIQUE: Axial and coronal CT images obtained from the lung apices  through the chest, abdomen, and pelvis after the uneventful  administration of Isovue 370 intravenous contrast given for a total of  135 mL. Radiation dose for this scan was reduced using automated  exposure control, adjustment of the mA and/or kV according to patient  size, or iterative reconstruction technique.    FINDINGS: The visible thyroid gland is unremarkable. No abnormally  enlarged mediastinal lymph nodes. Heart size is normal. No pericardial  or pleural effusion. No pneumothorax, consolidation, or pulmonary  mass. Linear opacities in the right lower lobe likely atelectasis  and/or scar formation. Degenerative disc disease in the lumbar spine.  No acute osseous abnormality.    The liver, gallbladder, spleen, adrenal glands, and pancreas are  unremarkable. Both kidneys are normally perfused. No hydronephrosis or  nephrolithiasis. No intraperitoneal fluid or air. Radiation seeds in  the prostate gland. The bladder is mostly decompressed. Sigmoid  diverticulosis. No dilated loops of bowel.    Aortoiliac atherosclerotic plaque. Scattered retroperitoneal lymph  nodes. Between the aorta and left kidney is a hypodensity measuring  2.3 x 2 cm and measures 8 Hounsfield units, unchanged. Fat-containing  right inguinal hernia with loop of small bowel at the mouth of the  hernia, unchanged and no evidence of bowel obstruction.      Impression    IMPRESSION:  1. Overall, stable exam. Scattered retroperitoneal lymph nodes are  stable. One hypodense area is seemingly cystic in the retroperitoneum,  unchanged in size and appearance.  2. Aortoiliac ectasia/slight aneurysmal dilatation unchanged.  3. Fat-containing right inguinal hernia with loops of small bowel near  the mouth of the  hernia. No evidence of bowel obstruction.    ANTHONY CARDOZA MD       Scribe Disclosure:  I, Verónica Cedillo, am serving as a scribe to document services personally performed by Bernabe Arita MD at this visit, based upon the provider's statements to me. All documentation has been reviewed by the aforementioned provider prior to being entered into the official medical record.

## 2020-01-23 NOTE — NURSING NOTE
Chief Complaint   Patient presents with     Cystoscopy     Hematuria       There were no vitals taken for this visit. There is no height or weight on file to calculate BMI.    Patient Active Problem List   Diagnosis     Embolism and thrombosis (H)     Malignant neoplasm of prostate (H)     CARDIOVASCULAR SCREENING; LDL GOAL LESS THAN 130     JACKI (obstructive sleep apnea)     Anxiety     Bilateral pulmonary embolism (H)     Obesity (BMI 35.0-39.9) with comorbidity (H)     History of pulmonary embolism       Allergies   Allergen Reactions     Ibuprofen Hives       Current Outpatient Medications   Medication Sig Dispense Refill     abiraterone (ZYTIGA) 250 MG tablet Take 4 tablets (1,000 mg) by mouth daily for 30 doses Take on empty stomach. 120 tablet 0     oxybutynin (DITROPAN) 5 MG tablet TAKE 1 TABLET BY MOUTH TWICE A DAY FOR 5 DAYS  0     oxyCODONE (ROXICODONE) 5 MG tablet Take 1 tablet (5 mg) by mouth every 6 hours as needed for pain 10 tablet 0     predniSONE (DELTASONE) 5 MG tablet Take 1 tablet (5 mg) by mouth daily 30 tablet 0     prochlorperazine (COMPAZINE) 10 MG tablet Take 0.5 tablets (5 mg) by mouth every 6 hours as needed (Nausea/Vomiting) (Patient not taking: Reported on 10/16/2019) 30 tablet 2     rivaroxaban ANTICOAGULANT (XARELTO) 20 MG TABS tablet Take 1 tablet (20 mg) by mouth daily (with dinner) (Patient not taking: Reported on 2019) 30 tablet 5       Social History     Tobacco Use     Smoking status: Former Smoker     Packs/day: 0.25     Years: 35.00     Pack years: 8.75     Types: Cigarettes     Start date: 2010     Last attempt to quit: 2019     Years since quittin.9     Smokeless tobacco: Never Used   Substance Use Topics     Alcohol use: Yes     Alcohol/week: 0.0 standard drinks     Frequency: 2-4 times a month     Drinks per session: 1 or 2     Binge frequency: Never     Comment: seldom     Drug use: No       Invasive Procedure Safety Checklist:    Procedure:  Cystoscopy    Action: Complete sections and checkboxes as appropriate.    Pre-procedure:  1. Patient ID Verified with 2 identifiers (Marsha and  or MRN) : YES    2. Procedure and site verified with patient/designee (when able) : YES    3. Accurate consent documentation in medical record : YES    4. H&P (or appropriate assessment) documented in medical record : N/A  H&P must be up to 30 days prior to procedure an updated within 24 hours of                 Procedure as applicable.     5. Relevant diagnostic and radiology test results appropriately labeled and displayed as applicable : YES    6. Blood products, implants, devices, and/or special equipment available for the procedure as applicable : YES    7. Procedure site(s) marked with provider initials [Exclusions: none] : NO    8. Marking not required. Reason : Yes  Procedure does not require site marking    Time Out:     Time-Out performed immediately prior to starting procedure, including verbal and active participation of all team members addressing: YES    1. Correct patient identity.  2. Confirmed that the correct side and site are marked.  3. An accurate procedure to be done.  4. Agreement on the procedure to be done.  5. Correct patient position.  6. Relevant images and results are properly labeled and appropriately displayed.  7. The need to administer antibiotics or fluids for irrigation purposes during the procedure as applicable.  8. Safety precautions based on patient history or medication use.    During Procedure: Verification of correct person, site, and procedure occurs any time the responsibility for care of the patient is transferred to another member of the care team.    The following medication was given:     MEDICATION:  Lidocaine without epinephrine 2% jelly  ROUTE: Urethral  SITE: Urethra via the meatus  DOSE: 10 mL  LOT #: [id number]  : [employer]  EXPIRATION DATE:   NDC#: [address]-49   Was there  drug waste? No    Prior to med admin, verified patient identity using patient's name and date of birth.  Due to med administration, patient instructed to remain in clinic for 15 minutes  afterwards, and to report any adverse reaction to me immediately.    Drug Amount Wasted:  None.  Vial/Syringe: Syringe      Trinity Menendez  1/23/2020  3:15 PM

## 2020-01-23 NOTE — TELEPHONE ENCOUNTER
ACOG completed. Orders in EMR. Bleeding and ectopic precautions provided. Pt verbalized understanding. No further questions or concerns at this time.      [facility] Call Center    Phone Message    May a detailed message be left on voicemail: yes    Reason for Call: Other: Nathan calling in to say he cannot come in at 1 p.m. for his appointment, and that he will be coming in for his original appointment time of 3:30 p.m. with 3:15 p.m. check in time. Nathan had received a message earlier today asking if he wanted to come in earlier.    Action Taken: Message routed to:  [facility] ([facility] EDEN Bermudez

## 2020-01-23 NOTE — LETTER
2020       RE: Keenan Sprague  [address]  [facility] [address]     Dear Colleague,    Thank you for referring your patient, Keenan Sprague, to the [facility] UROLOGY AND [facility] FOR PROSTATE AND UROLOGIC CANCERS at [facility]. Please see a copy of my visit note below.      Urology Clinic    Bernabe Arita MD  Date of Service: 2020     Name: Keenan Sprague  MRN: [id number]  Age: 69 year old  : 1950  Referring provider: Referred Self     Assessment:  History of prostate cancer   Keenan Sprague  is a 69 year old male with a history of GS= 3+4 prostate cancer. He is s/p hormonal therapy and brachytherapy in combination with external beam radiation therapy in . His PSA remained relatively stable until 2019 when it was measured at 17.80. Subsequently he was started on Lupron 22.5mg every 3 months.     Gross hematuria   He also has a recent history of gross hematuria back in 2019 when he presented to the ED with urinary rentention requiring a Alejo catheter. No additional episodes of hematuria since stopping his Xarelto. He was very anxious as he has had traumatic experience with a catheter in the past. Discussed due to his history of radiation, cystoscopy is recommended as there is a possibility of cystitis, progression of his prostate cancer or new bladder tumor. Cystoscopy was normal today. There was normal evidence of radiation in the bladder. Discussed if hematuria begins again we may recommend hyperbaric oxygen.      Plan:  -Follow up PRN.     Attestation:  This patient was seen and evaluated by me, with a scribe taking notes.  I have reviewed the note above and agree.  The physical exam and or any procedures were performed by me and the pertinant details are outlined below.       Bernabe Arita MD  Department of Urology  [facility]  [place]    ______________________________________________________________________    HPI  Keenan Sprague is a 69 year old male with a history of GS= 3+4 prostate cancer. He is s/p hormonal therapy and brachytherapy in combination with external beam radiation therapy in 2007. PSA remained undetectable until 11/2008 when it was 0.17. It remained relatively stable until it elevated to around 0.27 in 2015, 0.82 in 2017 and significantly to 17.80 in 02/2019, 23.3 in 03/2019 and 21.50 in 04/2019. He started Lupron 22.5mg every 3 months in 04/2019. He started Abiraterone 1000mg daily with Prednisone 5 mg every day per [facility] regimen 04/09/2019. He had CT CAP and NM bone scan revealing stable disease with no evidence of disease progression in 07/2019. He has a history of smoking 1/4 pack per day for 35 years but is not currently smoking (1/2 year).     On 08/27/2019 he presented to the ED with gross hematuria (passing blood clots) and urinary retention requiring a wiley catheter. ED determined hematuria was most likely related to his prostate cancer or hemorrhagic cystitis (on Xarelto). Same evening, he presented to the ED again but for dysuria, abdominal pain and discomfort at the tip of his penis with voiding. He was sent home with Oxybutynin, Pyridium, and Urojet to go home with. 08/30 he was seen in the ED again for evaluation of wiley. He presented with clear arnaldo urine and a desired to have his Wiley removed.      He has also recovered from the right shoulder arthroplasty performed in early 01/2019 and is now back at work full-time.       Today reports no addition episodes of hematuria since being off the Xarelto.      Initial PSA at diagnosis: 16.3   1st Biopsy (Date 12/2006) Yamil Score was 3 + 4 in the right mid/median prostate affecting 40% of the tissue, right apex affecting 50% of the tissue, right mid/lateral prostate affecting 30% of the tissue and right apex/lateral affecting 30% of the tissue and  "Yamil Score was 3 + 3  In the right base affecting 90% of the tissue.       Cystoscopy:   After sterile preparation and draping of the patient,  a 17-Slovak flexible cystoscope was introduced via the urethra.  It was passed without difficulty into the bladder.  The urethra was open without evidence of stricture.  The ureteral orifices were orthotopic.  The bladder mucosa was evaluated using both antegrade and retroflex views and this showed no evidence of any lesions. Bladder with some debris floating around. There were no stones.  No source of hematuria identified.    Review of Systems:   Pertinent items are noted in HPI or as below, remainder of complete ROS is negative.      Physical Exam:   Patient is a 69 year old  male   Vitals: Blood pressure (!) 154/95, pulse 73, height 1.88 m (6' 2\"), weight 135.6 kg (299 lb).  General Appearance Adult: Alert, no acute distress, oriented  HENT: throat/mouth:normal, good dentition  Lungs: no respiratory distress, or pursed lip breathing  Heart: No obvious jugular venous distension present  Abdomen: Body mass index is 38.39 kg/m .  Musculoskeltal: extremities normal  Skin: no visible suspicious lesions or rashes  Neuro: Alert, oriented, speech and mentation normal  Psych: affect and mood normal  Gait: Normal  : deferred    Laboratory:   I reviewed all applicable laboratory and pathology data and went over findings with patient  Significant for     Lab Results   Component Value Date    CR 0.86 01/08/2020    CR 0.94 11/20/2019    CR 0.81 10/16/2019    CR 1.02 08/27/2019    CR 0.89 08/27/2019    CR 0.96 08/14/2019    CR 0.93 07/17/2019    CR 1.01 05/29/2019    CR 0.82 04/03/2019    CR 0.79 03/13/2019       PSA   Date Value Ref Range Status   01/08/2020 0.05 0 - 4 ug/L Final     Comment:     Assay Method:  Chemiluminescence using Siemens Vista analyzer   11/20/2019 0.10 0 - 4 ug/L Final     Comment:     Assay Method:  Chemiluminescence using Siemens Vista analyzer "   10/16/2019 0.23 0 - 4 ug/L Final     Comment:     Assay Method:  Chemiluminescence using Siemens Vista analyzer   08/14/2019 1.46 0 - 4 ug/L Final     Comment:     Assay Method:  Chemiluminescence using Siemens Vista analyzer   07/17/2019 2.54 0 - 4 ug/L Final     Comment:     Assay Method:  Chemiluminescence using Siemens Vista analyzer   05/29/2019 5.40 (H) 0 - 4 ug/L Final     Comment:     Assay Method:  Chemiluminescence using Siemens Vista analyzer   05/01/2019 7.09 (H) 0 - 4 ug/L Final     Comment:     Assay Method:  Chemiluminescence using Siemens Vista analyzer   04/03/2019 21.50 (H) 0 - 4 ug/L Final     Comment:     Assay Method:  Chemiluminescence using Siemens Vista analyzer   03/13/2019 23.30 (H) 0 - 4 ug/L Final     Comment:     Assay Method:  Chemiluminescence using Siemens Vista analyzer   02/27/2019 17.80 (H) 0 - 4 ug/L Final     Comment:     Assay Method:  Chemiluminescence using Siemens Vista analyzer       Results for orders placed or performed during the hospital encounter of 08/27/19   UA with Microscopic   Result Value Ref Range    Color Urine Orange     Appearance Urine Slightly Cloudy     Glucose Urine Unable to assay due to interfering substance (A) NEG^Negative mg/dL    Bilirubin Urine Unable to assay due to interfering substance (A) NEG^Negative    Ketones Urine Unable to assay due to interfering substance (A) NEG^Negative mg/dL    Specific Gravity Urine Unable to assay due to interfering substance 1.003 - 1.035    Blood Urine Unable to assay due to interfering substance (A) NEG^Negative    pH Urine Unable to assay due to interfering substance 5.0 - 7.0 pH    Protein Albumin Urine Unable to assay due to interfering substance (A) NEG^Negative mg/dL    Urobilinogen mg/dL Unable to assay due to interfering substance 0.0 - 2.0 mg/dL    Nitrite Urine Unable to assay due to interfering substance (A) NEG^Negative    Leukocyte Esterase Urine Unable to assay due to interfering substance (A)  NEG^Negative    Source Catheterized Urine     WBC Urine 4 0 - 5 /HPF    RBC Urine >182 (H) 0 - 2 /HPF       Imaging:   I personally reviewed all applicable imaging and went over the below findings with patient.    Results for orders placed or performed during the hospital encounter of 11/06/19   CT Chest/Abdomen/Pelvis w Contrast    Narrative    CT CHEST/ABDOMEN/PELVIS WITH CONTRAST November 6, 2019 at 1218 hours    HISTORY: 69-year-old patient with oligomenorrhea metastatic hormone  sensitive prostate cancer with treatment. Restaging exam.    COMPARISON: July 24, 2019.    TECHNIQUE: Axial and coronal CT images obtained from the lung apices  through the chest, abdomen, and pelvis after the uneventful  administration of Isovue 370 intravenous contrast given for a total of  135 mL. Radiation dose for this scan was reduced using automated  exposure control, adjustment of the mA and/or kV according to patient  size, or iterative reconstruction technique.    FINDINGS: The visible thyroid gland is unremarkable. No abnormally  enlarged mediastinal lymph nodes. Heart size is normal. No pericardial  or pleural effusion. No pneumothorax, consolidation, or pulmonary  mass. Linear opacities in the right lower lobe likely atelectasis  and/or scar formation. Degenerative disc disease in the lumbar spine.  No acute osseous abnormality.    The liver, gallbladder, spleen, adrenal glands, and pancreas are  unremarkable. Both kidneys are normally perfused. No hydronephrosis or  nephrolithiasis. No intraperitoneal fluid or air. Radiation seeds in  the prostate gland. The bladder is mostly decompressed. Sigmoid  diverticulosis. No dilated loops of bowel.    Aortoiliac atherosclerotic plaque. Scattered retroperitoneal lymph  nodes. Between the aorta and left kidney is a hypodensity measuring  2.3 x 2 cm and measures 8 Hounsfield units, unchanged. Fat-containing  right inguinal hernia with loop of small bowel at the mouth of the  hernia,  unchanged and no evidence of bowel obstruction.      Impression    IMPRESSION:  1. Overall, stable exam. Scattered retroperitoneal lymph nodes are  stable. One hypodense area is seemingly cystic in the retroperitoneum,  unchanged in size and appearance.  2. Aortoiliac ectasia/slight aneurysmal dilatation unchanged.  3. Fat-containing right inguinal hernia with loops of small bowel near  the mouth of the hernia. No evidence of bowel obstruction.    ANTHONY CARDOZA MD       Scribe Disclosure:  I, Verónica Cedillo, am serving as a scribe to document services personally performed by Bernabe Arita MD at this visit, based upon the provider's statements to me. All documentation has been reviewed by the aforementioned provider prior to being entered into the official medical record.       Again, thank you for allowing me to participate in the care of your patient.      Sincerely,    Bernabe Arita MD

## 2020-01-23 NOTE — NURSING NOTE
The following medication was given:     MEDICATION:  Urojet  ROUTE: Urethral  SITE: Uretha  DOSE: 200 mg (20mg/mL)  LOT #: [id number]  : [employer]  EXPIRATION DATE: 09/2021  NDC#: [address]-2   Was there drug waste? No      Eileen Alvarez CMA  January 23, 2020

## 2020-01-23 NOTE — PATIENT INSTRUCTIONS
"Urine sent for analysis today.  If anything comes back abnormal, we will notify you right away.    Follow up with Dr. Arita as needed.    It was a pleasure meeting with you today.  Thank you for allowing me and my team the privilege of caring for you today.  YOU are the reason we are here, and I truly hope we provided you with the excellent service you deserve.  Please let us know if there is anything else we can do for you so that we can be sure you are leaving completely satisfied with your care experience.        Eileen Alvarez CMA,   AFTER YOUR CYSTOSCOPY        You have just completed a cystoscopy, or \"cysto\", which allowed your physician to learn more about your bladder (or to remove a stent placed after surgery). We suggest that you continue to avoid caffeine, fruit juice, and alcohol for the next 24 hours, however, you are encouraged to return to your normal activities.         A few things that are considered normal after your cystoscopy:     * Small amount of bleeding (or spotting) that clears within the next 24 hours     * Slight burning sensation with urination     * Sensation to of needing to avoid more frequently     * The feeling of \"air\" in your urine     * Mild discomfort that is relieved with Tylenol        Please contact our office promptly if you:     * Develop a fever above 101 degrees     * Are unable to urinate     * Develop bright red blood that does not stop     * Severe pain or swelling         Please contact our office with any concerns or questions @[facility].  "

## 2020-01-23 NOTE — ORAL ONC MGMT
Oral Chemotherapy Monitoring Program     Placed call in follow up of oral chemotherapy requesting call back. No patient names or medication names mentioned.     Adherence: PDC=1, patient refills on time. No concerns about adherence. Patient last picked up refill 1/25 for cycle 11 to start 2/3/2020.         Thank you for the opportunity to participate in the care of the above patient,  Zhen Castillo, PharmD  Hematology/Oncology Clinical Pharmacist  [facility]  [facility]  [phone]

## 2020-01-23 NOTE — NURSING NOTE
Invasive Procedure Safety Checklist:    Procedure: Cystoscopy    Action: Complete sections and checkboxes as appropriate.    Pre-procedure:  1. Patient ID Verified with 2 identifiers (Marsha and  or MRN) : YES    2. Procedure and site verified with patient/designee (when able) : YES    3. Accurate consent documentation in medical record : YES    4. H&P (or appropriate assessment) documented in medical record : NO  H&P must be up to 30 days prior to procedure an updated within 24 hours of                 Procedure as applicable.     5. Relevant diagnostic and radiology test results appropriately labeled and displayed as applicable : NO    6. Blood products, implants, devices, and/or special equipment available for the procedure as applicable : NO    7. Procedure site(s) marked with provider initials [Exclusions: ] : NO    8. Marking not required. Reason : Yes  Procedure does not require site marking    Time Out:     Time-Out performed immediately prior to starting procedure, including verbal and active participation of all team members addressing: YES    1. Correct patient identity.  2. Confirmed that the correct side and site are marked.  3. An accurate procedure to be done.  4. Agreement on the procedure to be done.  5. Correct patient position.  6. Relevant images and results are properly labeled and appropriately displayed.  7. The need to administer antibiotics or fluids for irrigation purposes during the procedure as applicable.  8. Safety precautions based on patient history or medication use.    During Procedure: Verification of correct person, site, and procedure occurs any time the responsibility for care of the patient is transferred to another member of the care team.    Eileen Alvarez, [facility]

## 2020-01-24 LAB
BACTERIA SPEC CULT: NO GROWTH
COPATH REPORT: NORMAL
Lab: NORMAL
SPECIMEN SOURCE: NORMAL

## 2020-02-16 ENCOUNTER — HEALTH MAINTENANCE LETTER (OUTPATIENT)
Age: 70
End: 2020-02-16

## 2020-02-19 ENCOUNTER — APPOINTMENT (OUTPATIENT)
Dept: LAB | Facility: CLINIC | Age: 70
End: 2020-02-19
Attending: INTERNAL MEDICINE
Payer: COMMERCIAL

## 2020-02-19 ENCOUNTER — ONCOLOGY VISIT (OUTPATIENT)
Dept: ONCOLOGY | Facility: CLINIC | Age: 70
End: 2020-02-19
Attending: INTERNAL MEDICINE
Payer: COMMERCIAL

## 2020-02-19 VITALS
DIASTOLIC BLOOD PRESSURE: 77 MMHG | SYSTOLIC BLOOD PRESSURE: 137 MMHG | HEIGHT: 74 IN | HEART RATE: 85 BPM | TEMPERATURE: 98.6 F | RESPIRATION RATE: 18 BRPM | WEIGHT: 290 LBS | BODY MASS INDEX: 37.22 KG/M2 | OXYGEN SATURATION: 95 %

## 2020-02-19 DIAGNOSIS — E66.01 MORBID OBESITY (H): ICD-10-CM

## 2020-02-19 DIAGNOSIS — Z79.899 ENCOUNTER FOR LONG-TERM (CURRENT) USE OF MEDICATIONS: ICD-10-CM

## 2020-02-19 DIAGNOSIS — C61 MALIGNANT NEOPLASM OF PROSTATE (H): Primary | ICD-10-CM

## 2020-02-19 LAB
ALBUMIN SERPL-MCNC: 3.9 G/DL (ref 3.4–5)
ALP SERPL-CCNC: 82 U/L (ref 40–150)
ALT SERPL W P-5'-P-CCNC: 27 U/L (ref 0–70)
ANION GAP SERPL CALCULATED.3IONS-SCNC: 5 MMOL/L (ref 3–14)
AST SERPL W P-5'-P-CCNC: 22 U/L (ref 0–45)
BASOPHILS # BLD AUTO: 0 10E9/L (ref 0–0.2)
BASOPHILS NFR BLD AUTO: 0.6 %
BILIRUB SERPL-MCNC: 0.5 MG/DL (ref 0.2–1.3)
BUN SERPL-MCNC: 25 MG/DL (ref 7–30)
CALCIUM SERPL-MCNC: 9.2 MG/DL (ref 8.5–10.1)
CHLORIDE SERPL-SCNC: 107 MMOL/L (ref 94–109)
CO2 SERPL-SCNC: 27 MMOL/L (ref 20–32)
CREAT SERPL-MCNC: 1.09 MG/DL (ref 0.66–1.25)
DIFFERENTIAL METHOD BLD: NORMAL
EOSINOPHIL # BLD AUTO: 0.1 10E9/L (ref 0–0.7)
EOSINOPHIL NFR BLD AUTO: 1.2 %
ERYTHROCYTE [DISTWIDTH] IN BLOOD BY AUTOMATED COUNT: 13.2 % (ref 10–15)
GFR SERPL CREATININE-BSD FRML MDRD: 70 ML/MIN/{1.73_M2}
GLUCOSE SERPL-MCNC: 92 MG/DL (ref 70–99)
HCT VFR BLD AUTO: 40.4 % (ref 40–53)
HGB BLD-MCNC: 13.9 G/DL (ref 13.3–17.7)
IMM GRANULOCYTES # BLD: 0 10E9/L (ref 0–0.4)
IMM GRANULOCYTES NFR BLD: 0.4 %
LYMPHOCYTES # BLD AUTO: 1.7 10E9/L (ref 0.8–5.3)
LYMPHOCYTES NFR BLD AUTO: 24.5 %
MCH RBC QN AUTO: 31.4 PG (ref 26.5–33)
MCHC RBC AUTO-ENTMCNC: 34.4 G/DL (ref 31.5–36.5)
MCV RBC AUTO: 91 FL (ref 78–100)
MONOCYTES # BLD AUTO: 0.4 10E9/L (ref 0–1.3)
MONOCYTES NFR BLD AUTO: 6.5 %
NEUTROPHILS # BLD AUTO: 4.6 10E9/L (ref 1.6–8.3)
NEUTROPHILS NFR BLD AUTO: 66.8 %
NRBC # BLD AUTO: 0 10*3/UL
NRBC BLD AUTO-RTO: 0 /100
PLATELET # BLD AUTO: 176 10E9/L (ref 150–450)
POTASSIUM SERPL-SCNC: 4 MMOL/L (ref 3.4–5.3)
PROT SERPL-MCNC: 7.4 G/DL (ref 6.8–8.8)
PSA SERPL-MCNC: 0.02 UG/L (ref 0–4)
RBC # BLD AUTO: 4.42 10E12/L (ref 4.4–5.9)
SODIUM SERPL-SCNC: 139 MMOL/L (ref 133–144)
WBC # BLD AUTO: 6.8 10E9/L (ref 4–11)

## 2020-02-19 PROCEDURE — 96402 CHEMO HORMON ANTINEOPL SQ/IM: CPT

## 2020-02-19 PROCEDURE — 36415 COLL VENOUS BLD VENIPUNCTURE: CPT

## 2020-02-19 PROCEDURE — 84153 ASSAY OF PSA TOTAL: CPT | Performed by: INTERNAL MEDICINE

## 2020-02-19 PROCEDURE — G0463 HOSPITAL OUTPT CLINIC VISIT: HCPCS | Mod: ZF

## 2020-02-19 PROCEDURE — 86316 IMMUNOASSAY TUMOR OTHER: CPT | Performed by: INTERNAL MEDICINE

## 2020-02-19 PROCEDURE — 85025 COMPLETE CBC W/AUTO DIFF WBC: CPT | Performed by: INTERNAL MEDICINE

## 2020-02-19 PROCEDURE — 84403 ASSAY OF TOTAL TESTOSTERONE: CPT | Performed by: INTERNAL MEDICINE

## 2020-02-19 PROCEDURE — 99215 OFFICE O/P EST HI 40 MIN: CPT | Mod: ZP | Performed by: INTERNAL MEDICINE

## 2020-02-19 PROCEDURE — 25000128 H RX IP 250 OP 636: Mod: ZF | Performed by: INTERNAL MEDICINE

## 2020-02-19 PROCEDURE — 80053 COMPREHEN METABOLIC PANEL: CPT | Performed by: INTERNAL MEDICINE

## 2020-02-19 RX ADMIN — LEUPROLIDE ACETATE 22.5 MG: KIT at 17:38

## 2020-02-19 ASSESSMENT — PAIN SCALES - GENERAL: PAINLEVEL: NO PAIN (0)

## 2020-02-19 ASSESSMENT — MIFFLIN-ST. JEOR: SCORE: 2160.18
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
## 2020-02-19 NOTE — NURSING NOTE
"Oncology Rooming Note    February 19, 2020 5:17 PM   Keenan Sprague is a 69 year old male who presents for:    Chief Complaint   Patient presents with     Blood Draw     labs drawn via venipuncture by RN in lab     Oncology Clinic Visit     Return; Prostate Ca     Initial Vitals: /77 (BP Location: Left arm, Patient Position: Chair, Cuff Size: Adult Large)   Pulse 85   Temp 98.6  F (37  C) (Oral)   Resp 18   Ht 1.88 m (6' 2\")   Wt 131.5 kg (290 lb)   SpO2 95%   BMI 37.23 kg/m   Estimated body mass index is 37.23 kg/m  as calculated from the following:    Height as of this encounter: 1.88 m (6' 2\").    Weight as of this encounter: 131.5 kg (290 lb). Body surface area is 2.62 meters squared.  No Pain (0) Comment: Data Unavailable   No LMP for male patient.  Allergies reviewed: Yes  Medications reviewed: Yes    Medications: Medication refills not needed today.  Pharmacy name entered into [employer]:    [facility] [place] - [place], [place] - [address]  [facility]  [facility] - [place], [place] - [address]  [id number] - [place], [place] - [address]  [facility] - [place], [place] - [address]    Clinical concerns:  Pt states he has some moles he would like to be looked at.        Jeanette Youssef CMA              "
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
## 2020-02-19 NOTE — NURSING NOTE
Patient was given Lupron in left ventralgluteal by haven estevez CMA Patient tolerated well. See VERITO Youssef CMA on 2/19/2020 at 5:46 PM

## 2020-02-20 ENCOUNTER — TELEPHONE (OUTPATIENT)
Dept: ONCOLOGY | Facility: CLINIC | Age: 70
End: 2020-02-20

## 2020-02-20 DIAGNOSIS — C61 MALIGNANT NEOPLASM OF PROSTATE (H): Primary | ICD-10-CM

## 2020-02-20 RX ORDER — ABIRATERONE ACETATE 250 MG/1
1000 TABLET ORAL DAILY
Qty: 120 TABLET | Refills: 0 | Status: SHIPPED | OUTPATIENT
Start: 2020-02-20 | End: 2020-03-18

## 2020-02-20 RX ORDER — PREDNISONE 5 MG/1
5 TABLET ORAL DAILY
Qty: 30 TABLET | Refills: 0 | Status: SHIPPED | OUTPATIENT
Start: 2020-02-20 | End: 2020-03-18

## 2020-02-20 NOTE — ORAL ONC MGMT
Oral Chemotherapy Monitoring Program     Placed call in follow up of oral chemotherapy requesting call back. No patient names or medication names mentioned.      Thank you for the opportunity to participate in the care of the above patient,  Zhen Castillo, PharmD  Hematology/Oncology Clinical Pharmacist  [facility]  [facility]  [phone]

## 2020-02-21 LAB — TESTOST SERPL-MCNC: <2 NG/DL (ref 240–950)

## 2020-02-22 LAB — CGA SERPL-MCNC: 98 NG/ML (ref 0–160)

## 2020-02-24 NOTE — ORAL ONC MGMT
Oral Chemotherapy Monitoring Program    Primary Oncologist: Dr. Davis  Primary Oncology Clinic: [facility]  Cancer Diagnosis: Prostate Cancer    Therapy History:  4/19/19: start abiraterone 1000 mg ([id number]) daily (with prednisone 5 mg daily)  Next cycle: 3/4/20    Drug Interaction Assessment: no new drug interactions    Lab Monitoring Plan  CMP/CBC every 6 weeks  Subjective/Objective:  Keenan Sprague is a 69 year old male contacted by phone for a follow-up visit for oral chemotherapy. Nathan states that everything is going well on abiraterone and prednisone. He confirmed taking abiraterone 1000 mg daily and prednisone 5 mg daily. He denies any adverse effects, medication changes, or missed doses.     ORAL CHEMOTHERAPY 5/15/2019 7/30/2019 8/29/2019 9/26/2019 11/26/2019 12/26/2019 2/24/2020   Drug Name Zytiga (Abiraterone) Zytiga (Abiraterone) Zytiga (Abiraterone) Zytiga (Abiraterone) Zytiga (Abiraterone) Zytiga (Abiraterone) Zytiga (Abiraterone)   Current Dosage 1000mg 1000mg 1000mg 1000mg 1000mg 1000mg 1000mg   Current Schedule Daily Daily Daily Daily Daily Daily Daily   Cycle Details Continuous Continuous Continuous Continuous Continuous Continuous Continuous   Start Date of Last Cycle - 7/8/2019 8/7/2019 9/6/2019 11/5/2019 12/5/2019 2/3/2020   Planned next cycle start date - 8/7/2019 9/6/2019 10/6/2019 12/5/2019 1/4/2019 3/4/2020   Doses missed in last 2 weeks 0 0 0 0 0 0 0   Adherence Assessment Adherent Adherent Adherent Adherent Adherent Adherent Adherent   Adverse Effects No AE identified during assessment Other (see note for details) Other (see note for details) No AE identified during assessment No AE identified during assessment No AE identified during assessment No AE identified during assessment   Other (see note for details) - hot flashes hot flashes - - - -   Pharmacist intervention? - Yes No - - - -   Intervention(s) - Referral to oncology provider - - - - -   Any new drug interactions? No No  "No No No No No   Is the dose as ordered appropriate for the patient? - Yes Yes Yes Yes Yes Yes   Has the patient been assessed within the past 6 months for depression? - Yes Yes - - - -   Has the patient missed any days of school, work, or other routine activity? - No No No No No No       Last PHQ-2 Score on record:   PHQ-2 ( 1999 [employer]) 3/5/2019 12/19/2018   Q1: Little interest or pleasure in doing things 0 0   Q2: Feeling down, depressed or hopeless 0 0   PHQ-2 Score 0 0           Vitals:  BP:   BP Readings from Last 1 Encounters:   02/19/20 137/77     Wt Readings from Last 1 Encounters:   02/19/20 131.5 kg (290 lb)     Estimated body surface area is 2.62 meters squared as calculated from the following:    Height as of 2/19/20: 1.88 m (6' 2\").    Weight as of 2/19/20: 131.5 kg (290 lb).    Labs:  _  Result Component Current Result Ref Range   Sodium 139 (2/19/2020) 133 - 144 mmol/L     _  Result Component Current Result Ref Range   Potassium 4.0 (2/19/2020) 3.4 - 5.3 mmol/L     _  Result Component Current Result Ref Range   Calcium 9.2 (2/19/2020) 8.5 - 10.1 mg/dL     No results found for Mag within last 30 days.     No results found for Phos within last 30 days.     _  Result Component Current Result Ref Range   Albumin 3.9 (2/19/2020) 3.4 - 5.0 g/dL     _  Result Component Current Result Ref Range   Urea Nitrogen 25 (2/19/2020) 7 - 30 mg/dL     _  Result Component Current Result Ref Range   Creatinine 1.09 (2/19/2020) 0.66 - 1.25 mg/dL       _  Result Component Current Result Ref Range   AST 22 (2/19/2020) 0 - 45 U/L     _  Result Component Current Result Ref Range   ALT 27 (2/19/2020) 0 - 70 U/L     _  Result Component Current Result Ref Range   Bilirubin Total 0.5 (2/19/2020) 0.2 - 1.3 mg/dL       _  Result Component Current Result Ref Range   WBC 6.8 (2/19/2020) 4.0 - 11.0 10e9/L     _  Result Component Current Result Ref Range   Hemoglobin 13.9 (2/19/2020) 13.3 - 17.7 g/dL     _  Result Component Current " Result Ref Range   Platelet Count 176 (2/19/2020) 150 - 450 10e9/L     _  Result Component Current Result Ref Range   Absolute Neutrophil 4.6 (2/19/2020) 1.6 - 8.3 10e9/L         Assessment:  Nathan continues to tolerate abiraterone and prednisone well.     Plan:  Continue current therapy.     Follow-Up:  3/31: labs and [facility] appointment     Refill Due:  Nathan will  abiraterone and prednisone at site [address] [place] [address] pharmacy on 2/26    Nae Engel, PharmD, BCOP  Hematology/Oncology Clinical Pharmacist  [facility]  [facility]  [phone]

## 2020-03-11 ENCOUNTER — OFFICE VISIT (OUTPATIENT)
Dept: INTERNAL MEDICINE | Facility: CLINIC | Age: 70
End: 2020-03-11
Payer: COMMERCIAL

## 2020-03-11 VITALS
HEIGHT: 74 IN | BODY MASS INDEX: 38.12 KG/M2 | RESPIRATION RATE: 18 BRPM | SYSTOLIC BLOOD PRESSURE: 150 MMHG | WEIGHT: 297 LBS | HEART RATE: 68 BPM | TEMPERATURE: 97.9 F | DIASTOLIC BLOOD PRESSURE: 70 MMHG | OXYGEN SATURATION: 98 %

## 2020-03-11 DIAGNOSIS — E66.01 MORBID OBESITY (H): ICD-10-CM

## 2020-03-11 DIAGNOSIS — G47.33 OSA (OBSTRUCTIVE SLEEP APNEA): ICD-10-CM

## 2020-03-11 DIAGNOSIS — I26.99 BILATERAL PULMONARY EMBOLISM (H): ICD-10-CM

## 2020-03-11 DIAGNOSIS — Z23 NEED FOR 23-POLYVALENT PNEUMOCOCCAL POLYSACCHARIDE VACCINE: ICD-10-CM

## 2020-03-11 DIAGNOSIS — Z00.00 MEDICARE ANNUAL WELLNESS VISIT, SUBSEQUENT: Primary | ICD-10-CM

## 2020-03-11 PROCEDURE — 90732 PPSV23 VACC 2 YRS+ SUBQ/IM: CPT | Performed by: INTERNAL MEDICINE

## 2020-03-11 PROCEDURE — 84443 ASSAY THYROID STIM HORMONE: CPT | Performed by: INTERNAL MEDICINE

## 2020-03-11 PROCEDURE — 99397 PER PM REEVAL EST PAT 65+ YR: CPT | Mod: 25 | Performed by: INTERNAL MEDICINE

## 2020-03-11 PROCEDURE — G0009 ADMIN PNEUMOCOCCAL VACCINE: HCPCS | Performed by: INTERNAL MEDICINE

## 2020-03-11 PROCEDURE — 36415 COLL VENOUS BLD VENIPUNCTURE: CPT | Performed by: INTERNAL MEDICINE

## 2020-03-11 ASSESSMENT — ENCOUNTER SYMPTOMS
DYSURIA: 0
WEAKNESS: 1
SORE THROAT: 0
NERVOUS/ANXIOUS: 0
HEMATURIA: 0
HEADACHES: 1
ARTHRALGIAS: 0
FEVER: 0
PALPITATIONS: 0
DIZZINESS: 0
FREQUENCY: 0
COUGH: 0
HEARTBURN: 0
EYE PAIN: 0
HEMATOCHEZIA: 0
NAUSEA: 1
CONSTIPATION: 0
CHILLS: 0
DIARRHEA: 0
PARESTHESIAS: 0
JOINT SWELLING: 0
MYALGIAS: 0
SHORTNESS OF BREATH: 0
ABDOMINAL PAIN: 0

## 2020-03-11 ASSESSMENT — ACTIVITIES OF DAILY LIVING (ADL): CURRENT_FUNCTION: NO ASSISTANCE NEEDED

## 2020-03-11 ASSESSMENT — MIFFLIN-ST. JEOR: SCORE: 2191.93

## 2020-03-11 NOTE — PROGRESS NOTES
"SUBJECTIVE:   Keenan Sprague is a 69 year old male who presents for Preventive Visit.      Are you in the first 12 months of your Medicare coverage?  No    Healthy Habits:     In general, how would you rate your overall health?  Good    Frequency of exercise:  4-5 days/week    Duration of exercise:  30-45 minutes    Do you usually eat at least 4 servings of fruit and vegetables a day, include whole grains    & fiber and avoid regularly eating high fat or \"junk\" foods?  No    Taking medications regularly:  Yes    Medication side effects:  Significant flushing and Other    Ability to successfully perform activities of daily living:  No assistance needed    Home Safety:  No safety concerns identified    Hearing Impairment:  No hearing concerns    In the past 6 months, have you been bothered by leaking of urine?  No    In general, how would you rate your overall mental or emotional health?  Good      PHQ-2 Total Score: 0    Additional concerns today:  No    Do you feel safe in your environment? Yes    Have you ever done Advance Care Planning? (For example, a Health Directive, POLST, or a discussion with a medical provider or your loved ones about your wishes): Yes, patient states has an Advance Care Planning document and will bring a copy to the clinic.      Fall risk  Fallen 2 or more times in the past year?: No  Any fall with injury in the past year?: No    Cognitive Screening   1) Repeat 3 items (Leader, Season, Table)    2) Clock draw: NORMAL  3) 3 item recall: Recalls 2 objects   Results: NORMAL clock, 2 items recalled: COGNITIVE IMPAIRMENT LESS LIKELY    Mini-CogTM Copyright FELICIANO Silver. Licensed by the author for use in [facility]; reprinted with permission (glenny@.[facility]). All rights reserved.      Do you have sleep apnea, excessive snoring or daytime drowsiness?: yes, sleep Apnea , HAS C-pap machine    Reviewed and updated as needed this visit by clinical staff         Reviewed and updated as needed " this visit by Provider        Social History     Tobacco Use     Smoking status: Former Smoker     Packs/day: 0.25     Years: 35.00     Pack years: 8.75     Types: Cigarettes     Start date: 2010     Last attempt to quit: 2019     Years since quittin.0     Smokeless tobacco: Never Used   Substance Use Topics     Alcohol use: Yes     Alcohol/week: 0.0 standard drinks     Frequency: 2-4 times a month     Drinks per session: 1 or 2     Binge frequency: Never     Comment: seldom     If you drink alcohol do you typically have >3 drinks per day or >7 drinks per week? No    Alcohol Use 3/11/2020   Prescreen: >3 drinks/day or >7 drinks/week? No   Prescreen: >3 drinks/day or >7 drinks/week? -           PROBLEMS TO ADD ON...  Has h/o obesity, JACKI, on CPAP. Helps with symptoms\. Keeps diet, exercise.   Has h/o prostate cancer, follows with urology.   Has h/o PE. Finished AC treatment. Follows with hematology.     Current providers sharing in care for this patient include:   Patient Care Team:  Jono Huber MD as PCP - General (Internal Medicine)  Dominic Zamudio MD as MD (Cardiology)  Jerardo Davis MD as MD (Internal Medicine-Hematology & Oncology)  Jono Huber MD as Assigned PCP  Jessie Robles RN as Specialty Care Coordinator (Hematology & Oncology)    The following health maintenance items are reviewed in Epic and correct as of today:  Health Maintenance   Topic Date Due     HEPATITIS C SCREENING  1950     ZOSTER IMMUNIZATION (2 of 3) 2012     MEDICARE ANNUAL WELLNESS VISIT  10/21/2017     PNEUMOCOCCAL IMMUNIZATION 65+ HIGH/HIGHEST RISK (2 of 2 - PPSV23) 2019     FALL RISK ASSESSMENT  2019     PHQ-2  2020     COLORECTAL CANCER SCREENING  2021     DTAP/TDAP/TD IMMUNIZATION (3 - Td) 2022     LIPID  2025     ADVANCE CARE PLANNING  2025     INFLUENZA VACCINE  Completed     AORTIC ANEURYSM SCREENING (SYSTEM ASSIGNED)  Completed     IPV  "IMMUNIZATION  Aged Out     MENINGITIS IMMUNIZATION  Aged Out     Lab work is in process  Labs reviewed in EPIC      Review of Systems   Constitutional: Negative for chills and fever.   HENT: Negative for congestion, ear pain, hearing loss and sore throat.    Eyes: Negative for pain and visual disturbance.   Respiratory: Negative for cough and shortness of breath.    Cardiovascular: Positive for peripheral edema. Negative for chest pain and palpitations.   Gastrointestinal: Positive for nausea. Negative for abdominal pain, constipation, diarrhea, heartburn and hematochezia.   Genitourinary: Negative for discharge, dysuria, frequency, genital sores, hematuria, impotence and urgency.   Musculoskeletal: Negative for arthralgias, joint swelling and myalgias.   Skin: Negative for rash.   Neurological: Positive for weakness and headaches. Negative for dizziness and paresthesias.   Psychiatric/Behavioral: Negative for mood changes. The patient is not nervous/anxious.          OBJECTIVE:   There were no vitals taken for this visit. Estimated body mass index is 37.23 kg/m  as calculated from the following:    Height as of 2/19/20: 1.88 m (6' 2\").    Weight as of 2/19/20: 131.5 kg (290 lb).  Physical Exam  GENERAL: obese, alert and no distress  EYES: Eyes grossly normal to inspection, PERRL and conjunctivae and sclerae normal  HENT: ear canals and TM's normal, nose and mouth without ulcers or lesions  NECK: no adenopathy, no asymmetry, masses, or scars and thyroid normal to palpation  RESP: lungs clear to auscultation - no rales, rhonchi or wheezes  CV: regular rate and rhythm, normal S1 S2, no S3 or S4, no murmur, click or rub, no peripheral edema and peripheral pulses strong  ABDOMEN: obese,soft, nontender, no hepatosplenomegaly, no masses and bowel sounds normal  MS: no gross musculoskeletal defects noted, no edema  SKIN: no suspicious lesions or rashes  NEURO: Normal strength and tone, mentation intact and speech " "normal  PSYCH: mentation appears normal, affect normal/bright    Diagnostic Test Results:  Labs reviewed in Epic    ASSESSMENT / PLAN:       ICD-10-CM    1. Medicare annual wellness visit, subsequent  Z00.00 TSH with free T4 reflex   2. Need for 23-polyvalent pneumococcal polysaccharide vaccine  Z23 Pneumococcal vaccine 23 valent PPSV23  (Pneumovax) [46206]     ADMIN MEDICARE: Pneumococcal Vaccine ()   3. JACKI (obstructive sleep apnea)  G47.33    4. Obesity (BMI 35.0-39.9) with comorbidity (H)  E66.01    5. Bilateral pulmonary embolism (H)  I26.99        COUNSELING:  Reviewed preventive health counseling, as reflected in patient instructions       Regular exercise       Healthy diet/nutrition       Vision screening       Hearing screening       Colon cancer screening       Prostate cancer screening    Estimated body mass index is 37.23 kg/m  as calculated from the following:    Height as of 2/19/20: 1.88 m (6' 2\").    Weight as of 2/19/20: 131.5 kg (290 lb).    Weight management plan: Discussed healthy diet and exercise guidelines     reports that he quit smoking about a year ago. His smoking use included cigarettes. He started smoking about 10 years ago. He has a 8.75 pack-year smoking history. He has never used smokeless tobacco.      Appropriate preventive services were discussed with this patient, including applicable screening as appropriate for cardiovascular disease, diabetes, osteopenia/osteoporosis, and glaucoma.  As appropriate for age/gender, discussed screening for colorectal cancer, prostate cancer, breast cancer, and cervical cancer. Checklist reviewing preventive services available has been given to the patient.    Reviewed patients plan of care and provided an AVS. The Intermediate Care Plan ( asthma action plan, low back pain action plan, and migraine action plan) for Keenan meets the Care Plan requirement. This Care Plan has been established and reviewed with the Patient.    Counseling " Resources:  ATP IV Guidelines  Pooled Cohorts Equation Calculator  Breast Cancer Risk Calculator  FRAX Risk Assessment  ICSI Preventive Guidelines  Dietary Guidelines for Americans, 2010  USDA's MyPlate  ASA Prophylaxis  Lung CA Screening    Jono Huber MD  [facility]    Identified Health Risks:

## 2020-03-11 NOTE — NURSING NOTE
"Vital signs:  Temp: 97.9  F (36.6  C) Temp src: Oral BP: (!) 150/70 Pulse: 68   Resp: 18 SpO2: 98 %     Height: 188 cm (6' 2\") Weight: 134.7 kg (297 lb)  Estimated body mass index is 38.13 kg/m  as calculated from the following:    Height as of this encounter: 1.88 m (6' 2\").    Weight as of this encounter: 134.7 kg (297 lb).        "

## 2020-03-11 NOTE — LETTER
March 12, 2020      Keenan Sprague  [address] [id number]        Dear ,    We are writing to inform you of your test results.    Normal results.    Resulted Orders   TSH with free T4 reflex   Result Value Ref Range    TSH 0.90 0.40 - 4.00 mU/L       If you have any questions or concerns, please call the clinic at the number listed above.       Sincerely,        Jono Huber MD

## 2020-03-12 LAB — TSH SERPL DL<=0.005 MIU/L-ACNC: 0.9 MU/L (ref 0.4–4)

## 2020-03-17 ENCOUNTER — TELEPHONE (OUTPATIENT)
Dept: INTERNAL MEDICINE | Facility: CLINIC | Age: 70
End: 2020-03-17

## 2020-03-18 ENCOUNTER — DOCUMENTATION ONLY (OUTPATIENT)
Dept: ONCOLOGY | Facility: CLINIC | Age: 70
End: 2020-03-18

## 2020-03-18 DIAGNOSIS — C61 MALIGNANT NEOPLASM OF PROSTATE (H): Primary | ICD-10-CM

## 2020-03-18 RX ORDER — ABIRATERONE ACETATE 250 MG/1
1000 TABLET ORAL DAILY
Qty: 120 TABLET | Refills: 0 | Status: SHIPPED | OUTPATIENT
Start: 2020-03-18 | End: 2020-04-15

## 2020-03-18 RX ORDER — PREDNISONE 5 MG/1
5 TABLET ORAL DAILY
Qty: 30 TABLET | Refills: 0 | Status: SHIPPED | OUTPATIENT
Start: 2020-03-18 | End: 2020-04-15

## 2020-03-18 NOTE — PROGRESS NOTES
Received FMLA form from patient. To be completed, signed and faxed once all is complete.    zUma Gonzalez CMA ([facility])

## 2020-03-19 NOTE — PROGRESS NOTES
Completed. Faxed and copy made and mailed to patient. Copy sent to HIM to be scanned into patient chart.     Uzma Gonzalez CMA ([facility])

## 2020-03-23 ENCOUNTER — CARE COORDINATION (OUTPATIENT)
Dept: ONCOLOGY | Facility: CLINIC | Age: 70
End: 2020-03-23

## 2020-03-23 ENCOUNTER — TELEPHONE (OUTPATIENT)
Dept: CARE COORDINATION | Facility: CLINIC | Age: 70
End: 2020-03-23

## 2020-03-23 NOTE — TELEPHONE ENCOUNTER
"SW received phone call from patient who requested letter from Dr. 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  SW indicated writer would relay message to care team with request. Jessie Robles RNCC for Dr. Davis notified and she indicated she would follow up with patient.      Soo Yeon Han, MSW, LICSW  Pager: [phone]  Phone: [phone]    "

## 2020-03-25 ENCOUNTER — TELEPHONE (OUTPATIENT)
Dept: ONCOLOGY | Facility: CLINIC | Age: 70
End: 2020-03-25

## 2020-03-25 NOTE — ORAL ONC MGMT
Oral Chemotherapy Monitoring Program     Primary Oncologist: Dr. Davis  Primary Oncology Clinic: [facility]  Cancer Diagnosis: Prostate Cancer     Therapy History:  4/19/19: start abiraterone 1000 mg ([id number]) daily (with prednisone 5 mg daily)  Next cycle: 3/4/20     Drug Interaction Assessment: no new drug interactions     Lab Monitoring Plan  CMP/CBC every 6 weeks    Subjective/Objective:  Keenan Sprague is a 69 year old male contacted by phone for a follow-up visit for oral chemotherapy.  Nathan says he is doing well on therapy. He has not missed any doses or started any new medications. He stated he had ~4 days left of his prescription however, so unsure if he got off on his cycles or not. States the only noticeable side effect has been hotflashes. He notices that they are worse when he is either over doing it at work or is more stressed out. States that he has been working with his job to be able to limit the number of hours he is working. He states that he is hoping this will help with some of the hotflashes he is experiencing.     ORAL CHEMOTHERAPY 7/30/2019 8/29/2019 9/26/2019 11/26/2019 12/26/2019 2/24/2020 3/25/2020   Drug Name Zytiga (Abiraterone) Zytiga (Abiraterone) Zytiga (Abiraterone) Zytiga (Abiraterone) Zytiga (Abiraterone) Zytiga (Abiraterone) Zytiga (Abiraterone)   Current Dosage 1000mg 1000mg 1000mg 1000mg 1000mg 1000mg 1000mg   Current Schedule Daily Daily Daily Daily Daily Daily Daily   Cycle Details Continuous Continuous Continuous Continuous Continuous Continuous Continuous   Start Date of Last Cycle 7/8/2019 8/7/2019 9/6/2019 11/5/2019 12/5/2019 2/3/2020 -   Planned next cycle start date 8/7/2019 9/6/2019 10/6/2019 12/5/2019 1/4/2019 3/4/2020 3/30/2020   Doses missed in last 2 weeks 0 0 0 0 0 0 0   Adherence Assessment Adherent Adherent Adherent Adherent Adherent Adherent Adherent   Adverse Effects Other (see note for details) Other (see note for details) No AE identified during  "assessment No AE identified during assessment No AE identified during assessment No AE identified during assessment Other (see note for details)   Other (see note for details) hot flashes hot flashes - - - - Hotflashes   Pharmacist intervention? Yes No - - - - Yes   Intervention(s) Referral to oncology provider - - - - - Patient education   Any new drug interactions? No No No No No No No   Is the dose as ordered appropriate for the patient? Yes Yes Yes Yes Yes Yes Yes   Is the patient currently in pain? - - - - - - No   Has the patient been assessed within the past 6 months for depression? Yes Yes - - - - -   Has the patient missed any days of school, work, or other routine activity? No No No No No No No       Vitals:  BP:   BP Readings from Last 1 Encounters:   03/11/20 (!) 150/70     Wt Readings from Last 1 Encounters:   03/11/20 134.7 kg (297 lb)     Estimated body surface area is 2.65 meters squared as calculated from the following:    Height as of 3/11/20: 1.88 m (6' 2\").    Weight as of 3/11/20: 134.7 kg (297 lb).    Labs:  No results found for NA within last 30 days.     No results found for K within last 30 days.     No results found for CA within last 30 days.     No results found for Mag within last 30 days.     No results found for Phos within last 30 days.     No results found for ALBUMIN within last 30 days.     No results found for BUN within last 30 days.     No results found for CR within last 30 days.       No results found for AST within last 30 days.     No results found for ALT within last 30 days.     No results found for BILITOTAL within last 30 days.       No results found for WBC within last 30 days.     No results found for HGB within last 30 days.     No results found for PLT within last 30 days.     No results found for ANC within last 30 days.       Assessment:  Patient is doing well on therapy.     Plan:  Continue Abiraterone 1000mg daily + prednisone 5mg daily    Follow-Up:  Review labs " and appt with Dr. Davis on 3/31/2020    Refill Due:  [facility] to deliver 3/27    Domenica Zimmerman, PharmD  PGY-2 Oncology Pharmacy Resident  [facility]  March 25, 2020

## 2020-03-30 DIAGNOSIS — C61 MALIGNANT NEOPLASM OF PROSTATE (H): ICD-10-CM

## 2020-03-30 LAB
BASOPHILS # BLD AUTO: 0 10E9/L (ref 0–0.2)
BASOPHILS NFR BLD AUTO: 0.3 %
DIFFERENTIAL METHOD BLD: ABNORMAL
EOSINOPHIL # BLD AUTO: 0.2 10E9/L (ref 0–0.7)
EOSINOPHIL NFR BLD AUTO: 2.8 %
ERYTHROCYTE [DISTWIDTH] IN BLOOD BY AUTOMATED COUNT: 13.7 % (ref 10–15)
HCT VFR BLD AUTO: 39.8 % (ref 40–53)
HGB BLD-MCNC: 13.1 G/DL (ref 13.3–17.7)
LYMPHOCYTES # BLD AUTO: 1.8 10E9/L (ref 0.8–5.3)
LYMPHOCYTES NFR BLD AUTO: 30.5 %
MCH RBC QN AUTO: 30.7 PG (ref 26.5–33)
MCHC RBC AUTO-ENTMCNC: 32.9 G/DL (ref 31.5–36.5)
MCV RBC AUTO: 93 FL (ref 78–100)
MONOCYTES # BLD AUTO: 0.5 10E9/L (ref 0–1.3)
MONOCYTES NFR BLD AUTO: 7.5 %
NEUTROPHILS # BLD AUTO: 3.6 10E9/L (ref 1.6–8.3)
NEUTROPHILS NFR BLD AUTO: 58.9 %
PLATELET # BLD AUTO: 155 10E9/L (ref 150–450)
RBC # BLD AUTO: 4.27 10E12/L (ref 4.4–5.9)
WBC # BLD AUTO: 6 10E9/L (ref 4–11)

## 2020-03-30 PROCEDURE — 84403 ASSAY OF TOTAL TESTOSTERONE: CPT | Performed by: INTERNAL MEDICINE

## 2020-03-30 PROCEDURE — 36415 COLL VENOUS BLD VENIPUNCTURE: CPT | Performed by: INTERNAL MEDICINE

## 2020-03-30 PROCEDURE — 99000 SPECIMEN HANDLING OFFICE-LAB: CPT | Performed by: INTERNAL MEDICINE

## 2020-03-30 PROCEDURE — 86316 IMMUNOASSAY TUMOR OTHER: CPT | Mod: 90 | Performed by: INTERNAL MEDICINE

## 2020-03-30 PROCEDURE — 84153 ASSAY OF PSA TOTAL: CPT | Performed by: INTERNAL MEDICINE

## 2020-03-30 PROCEDURE — 80053 COMPREHEN METABOLIC PANEL: CPT | Performed by: INTERNAL MEDICINE

## 2020-03-30 PROCEDURE — 85025 COMPLETE CBC W/AUTO DIFF WBC: CPT | Performed by: INTERNAL MEDICINE

## 2020-03-31 ENCOUNTER — DOCUMENTATION ONLY (OUTPATIENT)
Dept: ONCOLOGY | Facility: CLINIC | Age: 70
End: 2020-03-31

## 2020-03-31 ENCOUNTER — VIRTUAL VISIT (OUTPATIENT)
Dept: ONCOLOGY | Facility: CLINIC | Age: 70
End: 2020-03-31
Attending: INTERNAL MEDICINE
Payer: COMMERCIAL

## 2020-03-31 DIAGNOSIS — I74.9 EMBOLISM AND THROMBOSIS (H): Primary | ICD-10-CM

## 2020-03-31 DIAGNOSIS — C61 MALIGNANT NEOPLASM OF PROSTATE (H): ICD-10-CM

## 2020-03-31 DIAGNOSIS — I26.99 BILATERAL PULMONARY EMBOLISM (H): ICD-10-CM

## 2020-03-31 LAB
ALBUMIN SERPL-MCNC: 3.6 G/DL (ref 3.4–5)
ALP SERPL-CCNC: 67 U/L (ref 40–150)
ALT SERPL W P-5'-P-CCNC: 25 U/L (ref 0–70)
ANION GAP SERPL CALCULATED.3IONS-SCNC: 3 MMOL/L (ref 3–14)
AST SERPL W P-5'-P-CCNC: 16 U/L (ref 0–45)
BILIRUB SERPL-MCNC: 0.5 MG/DL (ref 0.2–1.3)
BUN SERPL-MCNC: 18 MG/DL (ref 7–30)
CALCIUM SERPL-MCNC: 9.1 MG/DL (ref 8.5–10.1)
CHLORIDE SERPL-SCNC: 106 MMOL/L (ref 94–109)
CO2 SERPL-SCNC: 29 MMOL/L (ref 20–32)
CREAT SERPL-MCNC: 0.92 MG/DL (ref 0.66–1.25)
GFR SERPL CREATININE-BSD FRML MDRD: 86 ML/MIN/{1.73_M2}
GLUCOSE SERPL-MCNC: 90 MG/DL (ref 70–99)
POTASSIUM SERPL-SCNC: 4.4 MMOL/L (ref 3.4–5.3)
PROT SERPL-MCNC: 7.2 G/DL (ref 6.8–8.8)
PSA SERPL-MCNC: 0.01 UG/L (ref 0–4)
SODIUM SERPL-SCNC: 138 MMOL/L (ref 133–144)

## 2020-03-31 PROCEDURE — 99213 OFFICE O/P EST LOW 20 MIN: CPT | Mod: TEL | Performed by: INTERNAL MEDICINE

## 2020-03-31 PROCEDURE — 40000114 ZZH STATISTIC NO CHARGE CLINIC VISIT

## 2020-03-31 NOTE — PROGRESS NOTES
"MEDICAL ONCOLOGY VIRTUAL CLINIC NOTE    Keenan Sprague is a 69 year old male who is being evaluated via a billable telephone visit.      The patient has been notified of following:   \"This telephone visit will be conducted via a call between you and your physician/provider. We have found that certain health care needs can be provided without the need for a physical exam.  This service lets us provide the care you need with a short phone conversation.  If a prescription is necessary we can send it directly to your pharmacy.  If lab work is needed we can place an order for that and you can then stop by our lab to have the test done at a later time.    If during the course of the call the physician/provider feels a telephone visit is not appropriate, you will not be charged for this service.\"     Physician has received verbal consent for a Telephone Visit from the patient? Yes    Keenan Sprague complains of    Chief Complaint   Patient presents with     Telephone     Telephone Visit: Neoplasm of Prostate     I have reviewed and updated the patient's Allergies and Medication List.    ALLERGIES  Ibuprofen    Discuss Lupron injections, when will the next one needed.   Discuss future plans, appointments, scheduled appointments etc.  Discuss personal Covid 19 risk due to being on chemo treatment. Patient would like noted that he did have labs done through [facility].       Please call mobile number for appointment, [phone].     Nikki Deluna, [facility]    PHYSICIAN NOTE  A 68-year-old gentleman with initially AUA intermediate-risk prostate adenocarcinoma, now with an oligometastatic retroperitoneal relapse.       Pt doing well; no missed doses or side effects from abiraterone, prednisone or lupron. LE edema stable and no evidence of bleeding/VTE. No signs/symptoms of progression of malignancy.     Labs reviewed - LFT, chem 7 WNL, Hb slightly lower but still close to normal; no blood loss; PSA is down to " 0.01.    Assessment/plan:   1.  Recurrent prostate cancer.   - Patient started abiraterone plus ADT for the recurrent oligometastatic prostate cancer based on  tumor board recommendations. I believe that the bone scan finding of right acromion region uptake is NOT due to malignancy, but from an arthroplasty in Jan 2019. This is improving on bone scan from 11/6/19 and reported as probably degenerative.  - While recurrent oligometastatic disease is considered incurable, the median life expectancy for patients with low-volume oligometastatic disease such as this gentleman in the LATITUDE and STAMPEDE trials was in excess of 5 years.  This survival is expected to increase with the Sabianist of several newer therapies in the CRPC setting.    - He has demonstrated excellent clinical, biochemical and radiographic response.   - He's tolerating treatment well and will continue abiraterone 1000mg every day and prednisone 5mg every day until disease progression. He wants a 3-months supply due to COVID-19. I will order but this decision is upto his insurance.  - Continue Lupron 22.5mg every 3 months - next dose in May 2020.   - Aware of the side effects of each agent including fatigue, nausea/vomiting, diarrhea, LFT changes, metabolic syndrome, fluid retention, risk of infections etc.   - Restaging scans in May 2020.      2.  History of pulmonary embolism.   - This appears to have been a provoked PE due to RUE immobilization from arthroplasty. Unrelated to recurrent prostate cancer diagnosis.    - Xarelto was stopped after recurrent hematuria in August 2019 and while a recent cystoscopy in Jan 2020 was negative, unclear if addl anticoagulation will prevent VTE recurrence. No issues at this time.     3. Employment forms:  - Workability form needed - will send message to [employer].    Mr. Sprague was given the opportunity to ask questions, which were answered to his satisfaction.  He is in complete agreement with this  plan.      RTC in 6 weeks with labs, CT C/A/P and NM bone scan, and Lupron injection.     BILLING: Phone encounter duration - 22 minutes     Jerardo Davis M.D.  . Professor of Medicine  Genitourinary Oncology  Division of Hematology, Oncology & Transplantation  [facility]

## 2020-03-31 NOTE — PROGRESS NOTES
Workability forms received via fax from .  Forms to be completed and put in folder for provider to approve.    Sury Bates CMA ([facility])

## 2020-04-01 LAB
CGA SERPL-MCNC: 106 NG/ML (ref 0–160)
TESTOST SERPL-MCNC: <2 NG/DL (ref 240–950)

## 2020-04-01 NOTE — PROGRESS NOTES
Signed workability form received. Faxed back to [facility], [phone]. Fax confirmation verified via rightfax. Copy of workability form sent to scanning.    Nikki Betancur CMA

## 2020-04-01 NOTE — PROGRESS NOTES
Workability forms filled out and put in providers folder for review and signature.      Sury Bates CMA ([facility])

## 2020-04-15 ENCOUNTER — TELEPHONE (OUTPATIENT)
Dept: ONCOLOGY | Facility: CLINIC | Age: 70
End: 2020-04-15

## 2020-04-15 DIAGNOSIS — C61 MALIGNANT NEOPLASM OF PROSTATE (H): Primary | ICD-10-CM

## 2020-04-15 RX ORDER — PREDNISONE 5 MG/1
5 TABLET ORAL DAILY
Qty: 30 TABLET | Refills: 2 | Status: SHIPPED | OUTPATIENT
Start: 2020-04-15 | End: 2020-07-08

## 2020-04-15 RX ORDER — ABIRATERONE ACETATE 250 MG/1
1000 TABLET ORAL DAILY
Qty: 120 TABLET | Refills: 2 | Status: SHIPPED | OUTPATIENT
Start: 2020-04-15 | End: 2020-07-08

## 2020-04-15 NOTE — TELEPHONE ENCOUNTER
PA Initiation    Medication: Zytiga - Submitted  Insurance Company: [employer] - Phone [phone] Fax [phone]  Pharmacy Filling the Rx:    Filling Pharmacy Phone:    Filling Pharmacy Fax:    Start Date: 4/15/2020        Munira Cunningham CPhT  [facility]  Oncology Pharmacy Liaison  [email or web address]  Phone: [phone]  Fax: [phone]

## 2020-04-16 NOTE — TELEPHONE ENCOUNTER
-- Message is from the [facility]--    Provider paged via [employer] Documentation - The below message was copied and pasted from a [employer] page:      2018 11:50:11 AM   [facility] Contact CenterACC NURSE [phone]   Juan Luis Aguilar 0\" tooltip-popup-delay=\"500\" cggepnz-qvgcoe-el-body=\"true\" uib-tooltip=\"\" tooltip-placement=\"auto top\" tooltip-enable=\"false\" psx-ellipsis=\"\"Juli Boucher   Secure Text   [phone] PATIENT NUMBER -------------------------------- ACC NURSE LINE (IF QUESTIONS ONLY - [phone]) URGENT FROM: MACO BOUCHER PT: ANNE MARIE AMARO : 2016 CONTACT #: [phone] RASH THAT WAS SEEN IN CLINIC FOR ON WEDNESDAY, SPREAD AND ALL OVER INCLUDING FACE WITH SWELLING. ED DISPOSITION. PLEASE CALL. RUTHANN BUTLER     Prior Authorization Approval    Authorization Effective Date: 4/16/2020  Authorization Expiration Date: 4/16/2021  Medication: Zytiga - APPROVED  Approved Dose/Quantity: 120/30  Reference #: [id number]   Insurance Company: [employer] - Phone [phone] Fax [phone]  Expected CoPay:       CoPay Card Available:      Foundation Assistance Needed:    Which Pharmacy is filling the prescription (Not needed for infusion/clinic administered):    Pharmacy Notified:    Patient Notified:          Munira Cunningham CPhT  [facility]  Oncology Pharmacy Liaison  [email or web address]  Phone: [phone]  Fax: [phone]

## 2020-04-29 ENCOUNTER — VIRTUAL VISIT (OUTPATIENT)
Dept: INTERNAL MEDICINE | Facility: CLINIC | Age: 70
End: 2020-04-29
Payer: COMMERCIAL

## 2020-04-29 DIAGNOSIS — J01.10 ACUTE NON-RECURRENT FRONTAL SINUSITIS: ICD-10-CM

## 2020-04-29 DIAGNOSIS — R19.7 DIARRHEA OF PRESUMED INFECTIOUS ORIGIN: Primary | ICD-10-CM

## 2020-04-29 DIAGNOSIS — R10.32 ABDOMINAL PAIN, LEFT LOWER QUADRANT: ICD-10-CM

## 2020-04-29 PROCEDURE — 99214 OFFICE O/P EST MOD 30 MIN: CPT | Mod: 95 | Performed by: INTERNAL MEDICINE
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
## 2020-04-30 ENCOUNTER — RESULTS ONLY (OUTPATIENT)
Dept: LAB | Age: 70
End: 2020-04-30

## 2020-04-30 ENCOUNTER — OFFICE VISIT (OUTPATIENT)
Dept: URGENT CARE | Facility: URGENT CARE | Age: 70
End: 2020-04-30
Payer: COMMERCIAL

## 2020-04-30 DIAGNOSIS — R19.7 DIARRHEA OF PRESUMED INFECTIOUS ORIGIN: ICD-10-CM

## 2020-04-30 DIAGNOSIS — Z53.9 ERRONEOUS ENCOUNTER--DISREGARD: Primary | ICD-10-CM

## 2020-04-30 LAB
C DIFF TOX B STL QL: NEGATIVE
SPECIMEN SOURCE: NORMAL

## 2020-04-30 PROCEDURE — 87493 C DIFF AMPLIFIED PROBE: CPT | Performed by: INTERNAL MEDICINE

## 2020-05-01 LAB
SARS-COV-2 RNA SPEC QL NAA+PROBE: NOT DETECTED
SPECIMEN SOURCE: NORMAL

## 2020-05-07 ENCOUNTER — TELEPHONE (OUTPATIENT)
Dept: ONCOLOGY | Facility: CLINIC | Age: 70
End: 2020-05-07

## 2020-05-08 ENCOUNTER — HOSPITAL ENCOUNTER (OUTPATIENT)
Dept: LAB | Facility: CLINIC | Age: 70
Setting detail: NUCLEAR MEDICINE
End: 2020-05-08
Attending: INTERNAL MEDICINE
Payer: COMMERCIAL

## 2020-05-08 ENCOUNTER — HOSPITAL ENCOUNTER (OUTPATIENT)
Dept: CT IMAGING | Facility: CLINIC | Age: 70
Discharge: HOME | End: 2020-05-08
Attending: INTERNAL MEDICINE | Admitting: INTERNAL MEDICINE
Payer: COMMERCIAL

## 2020-05-08 ENCOUNTER — HOSPITAL ENCOUNTER (OUTPATIENT)
Dept: NUCLEAR MEDICINE | Facility: CLINIC | Age: 70
Setting detail: NUCLEAR MEDICINE
End: 2020-05-08
Attending: INTERNAL MEDICINE
Payer: COMMERCIAL

## 2020-05-08 DIAGNOSIS — C61 MALIGNANT NEOPLASM OF PROSTATE (H): ICD-10-CM

## 2020-05-08 DIAGNOSIS — I26.99 BILATERAL PULMONARY EMBOLISM (H): ICD-10-CM

## 2020-05-08 DIAGNOSIS — Z79.899 ENCOUNTER FOR LONG-TERM (CURRENT) USE OF MEDICATIONS: ICD-10-CM

## 2020-05-08 LAB
ALBUMIN SERPL-MCNC: 3.4 G/DL (ref 3.4–5)
ALP SERPL-CCNC: 73 U/L (ref 40–150)
ALT SERPL W P-5'-P-CCNC: 21 U/L (ref 0–70)
ANION GAP SERPL CALCULATED.3IONS-SCNC: 7 MMOL/L (ref 3–14)
AST SERPL W P-5'-P-CCNC: 13 U/L (ref 0–45)
BASOPHILS # BLD AUTO: 0 10E9/L (ref 0–0.2)
BASOPHILS NFR BLD AUTO: 0.7 %
BILIRUB SERPL-MCNC: 0.4 MG/DL (ref 0.2–1.3)
BUN SERPL-MCNC: 18 MG/DL (ref 7–30)
CALCIUM SERPL-MCNC: 8.9 MG/DL (ref 8.5–10.1)
CHLORIDE SERPL-SCNC: 106 MMOL/L (ref 94–109)
CO2 SERPL-SCNC: 25 MMOL/L (ref 20–32)
CREAT SERPL-MCNC: 0.88 MG/DL (ref 0.66–1.25)
DIFFERENTIAL METHOD BLD: ABNORMAL
EOSINOPHIL # BLD AUTO: 0.2 10E9/L (ref 0–0.7)
EOSINOPHIL NFR BLD AUTO: 3 %
ERYTHROCYTE [DISTWIDTH] IN BLOOD BY AUTOMATED COUNT: 13.3 % (ref 10–15)
GFR SERPL CREATININE-BSD FRML MDRD: 89 ML/MIN/{1.73_M2}
GLUCOSE SERPL-MCNC: 86 MG/DL (ref 70–99)
HCT VFR BLD AUTO: 37.7 % (ref 40–53)
HGB BLD-MCNC: 12.9 G/DL (ref 13.3–17.7)
IMM GRANULOCYTES # BLD: 0 10E9/L (ref 0–0.4)
IMM GRANULOCYTES NFR BLD: 0.2 %
LYMPHOCYTES # BLD AUTO: 1.5 10E9/L (ref 0.8–5.3)
LYMPHOCYTES NFR BLD AUTO: 28.3 %
MCH RBC QN AUTO: 31.5 PG (ref 26.5–33)
MCHC RBC AUTO-ENTMCNC: 34.2 G/DL (ref 31.5–36.5)
MCV RBC AUTO: 92 FL (ref 78–100)
MONOCYTES # BLD AUTO: 0.4 10E9/L (ref 0–1.3)
MONOCYTES NFR BLD AUTO: 7.5 %
NEUTROPHILS # BLD AUTO: 3.2 10E9/L (ref 1.6–8.3)
NEUTROPHILS NFR BLD AUTO: 60.3 %
NRBC # BLD AUTO: 0 10*3/UL
NRBC BLD AUTO-RTO: 0 /100
PLATELET # BLD AUTO: 153 10E9/L (ref 150–450)
POTASSIUM SERPL-SCNC: 4.1 MMOL/L (ref 3.4–5.3)
PROT SERPL-MCNC: 6.5 G/DL (ref 6.8–8.8)
PSA SERPL-MCNC: <0.01 UG/L (ref 0–4)
RBC # BLD AUTO: 4.1 10E12/L (ref 4.4–5.9)
SODIUM SERPL-SCNC: 138 MMOL/L (ref 133–144)
WBC # BLD AUTO: 5.3 10E9/L (ref 4–11)

## 2020-05-08 PROCEDURE — 34300033 ZZH RX 343: Performed by: INTERNAL MEDICINE

## 2020-05-08 PROCEDURE — 78306 BONE IMAGING WHOLE BODY: CPT

## 2020-05-08 PROCEDURE — 25000128 H RX IP 250 OP 636: Performed by: INTERNAL MEDICINE

## 2020-05-08 PROCEDURE — 25000125 ZZHC RX 250: Performed by: INTERNAL MEDICINE

## 2020-05-08 PROCEDURE — 36415 COLL VENOUS BLD VENIPUNCTURE: CPT | Performed by: INTERNAL MEDICINE

## 2020-05-08 PROCEDURE — 71260 CT THORAX DX C+: CPT

## 2020-05-08 PROCEDURE — 84153 ASSAY OF PSA TOTAL: CPT | Performed by: INTERNAL MEDICINE

## 2020-05-08 PROCEDURE — 86316 IMMUNOASSAY TUMOR OTHER: CPT | Performed by: INTERNAL MEDICINE

## 2020-05-08 PROCEDURE — 85025 COMPLETE CBC W/AUTO DIFF WBC: CPT | Performed by: INTERNAL MEDICINE

## 2020-05-08 PROCEDURE — 80053 COMPREHEN METABOLIC PANEL: CPT | Performed by: INTERNAL MEDICINE

## 2020-05-08 PROCEDURE — A9503 TC99M MEDRONATE: HCPCS | Performed by: INTERNAL MEDICINE

## 2020-05-08 PROCEDURE — 84403 ASSAY OF TOTAL TESTOSTERONE: CPT | Performed by: INTERNAL MEDICINE

## 2020-05-08 RX ORDER — TC 99M MEDRONATE 20 MG/10ML
25 INJECTION, POWDER, LYOPHILIZED, FOR SOLUTION INTRAVENOUS ONCE
Status: COMPLETED | OUTPATIENT
Start: 2020-05-08 | End: 2020-05-08

## 2020-05-08 RX ORDER — IOPAMIDOL 755 MG/ML
135 INJECTION, SOLUTION INTRAVASCULAR ONCE
Status: COMPLETED | OUTPATIENT
Start: 2020-05-08 | End: 2020-05-08

## 2020-05-08 RX ADMIN — IOPAMIDOL 135 ML: 755 INJECTION, SOLUTION INTRAVENOUS at 10:16

## 2020-05-08 RX ADMIN — TC 99M MEDRONATE 27.7 MCI.: 20 INJECTION, POWDER, LYOPHILIZED, FOR SOLUTION INTRAVENOUS at 10:05

## 2020-05-08 RX ADMIN — SODIUM CHLORIDE 79 ML: 9 INJECTION, SOLUTION INTRAVENOUS at 10:16

## 2020-05-09 LAB — TESTOST SERPL-MCNC: <2 NG/DL (ref 240–950)

## 2020-05-11 LAB — CGA SERPL-MCNC: 100 NG/ML (ref 0–160)

## 2020-05-12 ENCOUNTER — VIRTUAL VISIT (OUTPATIENT)
Dept: ONCOLOGY | Facility: CLINIC | Age: 70
End: 2020-05-12
Attending: INTERNAL MEDICINE
Payer: COMMERCIAL

## 2020-05-12 DIAGNOSIS — I74.9 EMBOLISM AND THROMBOSIS (H): ICD-10-CM

## 2020-05-12 DIAGNOSIS — C61 MALIGNANT NEOPLASM OF PROSTATE (H): Primary | ICD-10-CM

## 2020-05-12 PROCEDURE — 40000114 ZZH STATISTIC NO CHARGE CLINIC VISIT

## 2020-05-12 PROCEDURE — 99214 OFFICE O/P EST MOD 30 MIN: CPT | Mod: TEL | Performed by: INTERNAL MEDICINE
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
## 2020-05-12 NOTE — PROGRESS NOTES
"Keenan Sprague is a 69 year old male who is being evaluated via a billable telephone visit.      The patient has been notified of following:     \"This telephone visit will be conducted via a call between you and your physician/provider. We have found that certain health care needs can be provided without the need for a physical exam.  This service lets us provide the care you need with a short phone conversation.  If a prescription is necessary we can send it directly to your pharmacy.  If lab work is needed we can place an order for that and you can then stop by our lab to have the test done at a later time.    Telephone visits are billed at different rates depending on your insurance coverage. During this emergency period, for some insurers they may be billed the same as an in-person visit.  Please reach out to your insurance provider with any questions.    If during the course of the call the physician/provider feels a telephone visit is not appropriate, you will not be charged for this service.\"    Patient has given verbal consent for Telephone visit?  Yes    What phone number would you like to be contacted at? [phone]    How would you like to obtain your AVS? MyChart    I have reviewed and updated the patient's allergies and medication list.     Concerns: No  Refills: abiraterone and prednisone- can he have more than a month supply    Elsy Leal LPN      PHYSICIAN NOTE  Please see my separate note from May 12, 2020.     Call duration: See my note.    Jerardo Davis M.D.  "

## 2020-05-13 ENCOUNTER — ALLIED HEALTH/NURSE VISIT (OUTPATIENT)
Dept: ONCOLOGY | Facility: CLINIC | Age: 70
End: 2020-05-13
Attending: INTERNAL MEDICINE
Payer: COMMERCIAL

## 2020-05-13 VITALS
TEMPERATURE: 98.8 F | OXYGEN SATURATION: 98 % | HEART RATE: 63 BPM | DIASTOLIC BLOOD PRESSURE: 76 MMHG | SYSTOLIC BLOOD PRESSURE: 144 MMHG

## 2020-05-13 DIAGNOSIS — C61 MALIGNANT NEOPLASM OF PROSTATE (H): Primary | ICD-10-CM

## 2020-05-13 PROCEDURE — 25000128 H RX IP 250 OP 636: Mod: ZF | Performed by: INTERNAL MEDICINE

## 2020-05-13 PROCEDURE — 96402 CHEMO HORMON ANTINEOPL SQ/IM: CPT

## 2020-05-13 RX ADMIN — LEUPROLIDE ACETATE 22.5 MG: KIT at 12:44

## 2020-05-13 ASSESSMENT — PAIN SCALES - GENERAL: PAINLEVEL: NO PAIN (0)

## 2020-05-13 NOTE — PROGRESS NOTES
Patient presents to the [facility] for leuprolide (LUPRON DEPOT) kit 22.5 mg. Order written by Dr. Davis was completed today. Name and  were verified by patient. See MAR for medication details. Patient was asked if they have any new symptoms or questions/concerns. Medication was given in the following site: left ventral gluteal per patient request. Patient tolerated injection well and was discharged to home.    -Keri GONZALEZ CMA

## 2020-06-10 ENCOUNTER — PATIENT OUTREACH (OUTPATIENT)
Dept: ONCOLOGY | Facility: CLINIC | Age: 70
End: 2020-06-10

## 2020-06-10 NOTE — PROGRESS NOTES
L/M for Nathan re: change in appt date/time with Dr. Davis. Informed him his appt has been changed from 6/23 to 6/24 at 4:30 pm due to changes in Dr. Davis's availability.     Encouraged Nathan to call with any additional questions or concerns.     Jessie Robles, SLIMN, RN  RN Care Coordinator  [facility]  Dr. Jerardo Davis

## 2020-06-19 ENCOUNTER — HOSPITAL ENCOUNTER (OUTPATIENT)
Dept: LAB | Facility: CLINIC | Age: 70
End: 2020-06-19
Attending: INTERNAL MEDICINE
Payer: COMMERCIAL

## 2020-06-19 DIAGNOSIS — C61 MALIGNANT NEOPLASM OF PROSTATE (H): ICD-10-CM

## 2020-06-19 LAB
BASOPHILS # BLD AUTO: 0 10E9/L (ref 0–0.2)
BASOPHILS NFR BLD AUTO: 0.3 %
DIFFERENTIAL METHOD BLD: ABNORMAL
EOSINOPHIL # BLD AUTO: 0.1 10E9/L (ref 0–0.7)
EOSINOPHIL NFR BLD AUTO: 2 %
ERYTHROCYTE [DISTWIDTH] IN BLOOD BY AUTOMATED COUNT: 13.3 % (ref 10–15)
HCT VFR BLD AUTO: 39.8 % (ref 40–53)
HGB BLD-MCNC: 13.4 G/DL (ref 13.3–17.7)
LYMPHOCYTES # BLD AUTO: 1.8 10E9/L (ref 0.8–5.3)
LYMPHOCYTES NFR BLD AUTO: 28.9 %
MCH RBC QN AUTO: 30.9 PG (ref 26.5–33)
MCHC RBC AUTO-ENTMCNC: 33.7 G/DL (ref 31.5–36.5)
MCV RBC AUTO: 92 FL (ref 78–100)
MONOCYTES # BLD AUTO: 0.5 10E9/L (ref 0–1.3)
MONOCYTES NFR BLD AUTO: 7.4 %
NEUTROPHILS # BLD AUTO: 3.8 10E9/L (ref 1.6–8.3)
NEUTROPHILS NFR BLD AUTO: 61.4 %
PLATELET # BLD AUTO: 157 10E9/L (ref 150–450)
RBC # BLD AUTO: 4.34 10E12/L (ref 4.4–5.9)
WBC # BLD AUTO: 6.1 10E9/L (ref 4–11)

## 2020-06-19 PROCEDURE — 86316 IMMUNOASSAY TUMOR OTHER: CPT | Mod: 90 | Performed by: INTERNAL MEDICINE

## 2020-06-19 PROCEDURE — 84153 ASSAY OF PSA TOTAL: CPT | Performed by: INTERNAL MEDICINE

## 2020-06-19 PROCEDURE — 99000 SPECIMEN HANDLING OFFICE-LAB: CPT | Performed by: INTERNAL MEDICINE

## 2020-06-19 PROCEDURE — 36415 COLL VENOUS BLD VENIPUNCTURE: CPT | Performed by: INTERNAL MEDICINE

## 2020-06-19 PROCEDURE — 80053 COMPREHEN METABOLIC PANEL: CPT | Performed by: INTERNAL MEDICINE

## 2020-06-19 PROCEDURE — 85025 COMPLETE CBC W/AUTO DIFF WBC: CPT | Performed by: INTERNAL MEDICINE

## 2020-06-19 PROCEDURE — 84403 ASSAY OF TOTAL TESTOSTERONE: CPT | Performed by: INTERNAL MEDICINE

## 2020-06-20 LAB
ALBUMIN SERPL-MCNC: 3.8 G/DL (ref 3.4–5)
ALP SERPL-CCNC: 78 U/L (ref 40–150)
ALT SERPL W P-5'-P-CCNC: 28 U/L (ref 0–70)
ANION GAP SERPL CALCULATED.3IONS-SCNC: 8 MMOL/L (ref 3–14)
AST SERPL W P-5'-P-CCNC: 22 U/L (ref 0–45)
BILIRUB SERPL-MCNC: 0.4 MG/DL (ref 0.2–1.3)
BUN SERPL-MCNC: 14 MG/DL (ref 7–30)
CALCIUM SERPL-MCNC: 9.2 MG/DL (ref 8.5–10.1)
CHLORIDE SERPL-SCNC: 108 MMOL/L (ref 94–109)
CO2 SERPL-SCNC: 22 MMOL/L (ref 20–32)
CREAT SERPL-MCNC: 0.96 MG/DL (ref 0.66–1.25)
GFR SERPL CREATININE-BSD FRML MDRD: 81 ML/MIN/{1.73_M2}
GLUCOSE SERPL-MCNC: 87 MG/DL (ref 70–99)
POTASSIUM SERPL-SCNC: 4 MMOL/L (ref 3.4–5.3)
PROT SERPL-MCNC: 7.4 G/DL (ref 6.8–8.8)
PSA SERPL-MCNC: <0.01 UG/L (ref 0–4)
SODIUM SERPL-SCNC: 138 MMOL/L (ref 133–144)

## 2020-06-23 LAB — TESTOST SERPL-MCNC: <2 NG/DL (ref 240–950)

## 2020-06-24 LAB — CGA SERPL-MCNC: 96 NG/ML (ref 0–160)

## 2020-07-01 ENCOUNTER — VIRTUAL VISIT (OUTPATIENT)
Dept: ONCOLOGY | Facility: CLINIC | Age: 70
End: 2020-07-01
Attending: DIETITIAN, REGISTERED
Payer: COMMERCIAL

## 2020-07-01 VITALS — WEIGHT: 276 LBS | BODY MASS INDEX: 35.44 KG/M2

## 2020-07-01 DIAGNOSIS — C61 MALIGNANT NEOPLASM OF PROSTATE (H): Primary | ICD-10-CM

## 2020-07-01 PROCEDURE — 97802 MEDICAL NUTRITION INDIV IN: CPT | Mod: 95,ZF | Performed by: DIETITIAN, REGISTERED

## 2020-07-01 NOTE — LETTER
"  7/1/2020     RE: Keenan Sprague  [address]  [place] [address]    Dear Colleague,    Thank you for referring your patient, Keenan Sprague, to the [facility]. Please see a copy of my visit note below.    Keenan Sprague is a 69 year old male who is being evaluated via a billable telephone visit.      CLINICAL NUTRITION SERVICES - ASSESSMENT NOTE    Keenan Sprague 69 year old referred for MNT related to prostate cancer    Time Spent: 60 minutes  Visit Type: phone  Referring Physician: Ryan 2/19    Nutrition Prescription   Recommendations Suggested by Registered Dietitian (RD):   1. <2000kcal/day for desired wt loss  2. 100 grams protein/day   Malnutrition: does not meet criteria at thi time     NUTRITION HISTORY       Nathan requested RD visit to review diet with cancer.    He tells me that when his most recent weight reading was almost 300 lbs, he felt very inclined to change is diet and meal routine to help lose weight and improve his health.      With this, he started intermittent fasting and drastically reduced his caloric intake.    Per diet recall, he may be consuming as low as 800-1000kcal/day.   He eats from 9am-1pm and fasts the remainder of the day.  He will occasionally have dinner which is around 5pm.    He has cut out most sugars, grains, alcohol, processed foods and fast foods.    He has introduced more fruits and vegetables to his diet which he is proud of.   He tells me that some mornings after fasting, he feels weak and light-headed, thus tries to eat more during the day and extends his eating time frame further.     Diet Recall  Breakfast 1-2 cups fruit, 2 pc sausage, cheese stick, Premire protein shake   Lunch 1 cup raw vegetables, 1 cup fruit, 1 lean meat source   Dinner Skips/fasts or has chicken stir gillespie   Snacks Fruit, trail mix   Beverages Water and gatorade (<6 cups/day)     ANTHROPOMETRICS  Height: 6'2\"  Weight: 276 lbs/125kg  BMI: 35  Weight Status:  Obesity " Grade II BMI 35-39.9  IBW: 192 (143%)  Weight History: down 21 lbs x past 1-2 months per pt report (intentional weight loss)  Wt Readings from Last 10 Encounters:   07/01/20 125.2 kg (276 lb)   03/11/20 134.7 kg (297 lb)   02/19/20 131.5 kg (290 lb)   01/23/20 135.6 kg (299 lb)   01/08/20 135.6 kg (299 lb)   12/22/19 131.4 kg (289 lb 11 oz)   11/20/19 131.4 kg (289 lb 9.6 oz)   10/16/19 130.5 kg (287 lb 9.6 oz)   09/10/19 127 kg (280 lb)   09/09/19 127 kg (280 lb)     Dosing Weight: 96kg    Medications/vitamins/minerals/herbals:   Reviewed    Labs:   Labs reviewed    NUTRITION FOCUSED PHYSICAL ASSESSMENT FOR DIAGNOSING MALNUTRITION:  Not observed due to Covid 19 pandemic (phone call only)    ASSESSED NUTRITION NEEDS:  Estimated Energy Needs: 2000 kcals (20 Kcal/Kg)  Justification: obese  Estimated Protein Needs: 100 grams protein (1 g pro/Kg)  Justification: maintenance  Estimated Fluid Needs: 2000  mL   Justification: 1ml/kcal    MALNUTRITION:   % Weight Loss:  Weight loss does not meet criteria for malnutrition   % Intake:  No decreased intake noted  Subcutaneous Fat Loss:  None observed  Muscle Loss:  None observed  Fluid Retention:  None noted    Malnutrition Diagnosis: Patient does not meet two of the above criteria necessary for diagnosing malnutrition    NUTRITION DIAGNOSIS:  Food and nutrition-related knowledge deficit related to nutrition needs with cancer as evidenced by pt with questions regarding food choices, calorie and protein needs.     INTERVENTIONS  Provided written & verbal education:   - Reviewed diet and behavioral modification for desired wt loss (healthy fasting regimen, portion control, balanced diet, tracking protein intake etc.  Reviewed calorie and protein needs.  Advised Nathan to at least aim for 2000kcal/day and 100g protein/day.  Discussed that rapid weight loss can increase risk for nutrient deficiencies.  Discussed healthy weight loss (1-2 lb/week versus 5 lb/week).     Provided pt with  corresponding education materials/handouts on:  Prostate Cancer, Nutrition and Lifestyle, Sources of Protein, 100-calorie snack ideas    Pt verbalize understanding of materials provided during consult.   Patient Understanding: Excellent  Expected Compliance: Excellent     Goals  1.  Aim for 5-6 small frequent meals  2.  Aim for 2000kcal and 100g protein/day  3. Weight loss towards goal weight (210-240 lbs) - no more than 2 lb wt loss /week to prevent malnutrition.       Follow-Up Plans: Pt has RD contact information for questions.    Kimberly Martinez RDN, LD

## 2020-07-01 NOTE — PROGRESS NOTES
"Keenan Sprague is a 69 year old male who is being evaluated via a billable telephone visit.      The patient has been notified of following:     \"This telephone visit will be conducted via a call between you and your physician/provider. We have found that certain health care needs can be provided without the need for a physical exam.  This service lets us provide the care you need with a short phone conversation.  If a prescription is necessary we can send it directly to your pharmacy.  If lab work is needed we can place an order for that and you can then stop by our lab to have the test done at a later time.    Telephone visits are billed at different rates depending on your insurance coverage. During this emergency period, for some insurers they may be billed the same as an in-person visit.  Please reach out to your insurance provider with any questions.    If during the course of the call the physician/provider feels a telephone visit is not appropriate, you will not be charged for this service.\"    Patient has given verbal consent for Telephone visit?  Yes    CLINICAL NUTRITION SERVICES - ASSESSMENT NOTE    Keenan Sprague 69 year old referred for MNT related to prostate cancer    Time Spent: 60 minutes  Visit Type: phone  Referring Physician: Ryan 2/19    Nutrition Prescription   Recommendations Suggested by Registered Dietitian (RD):   1. <2000kcal/day for desired wt loss  2. 100 grams protein/day   Malnutrition: does not meet criteria at thi time     NUTRITION HISTORY       Nathan requested RD visit to review diet with cancer.    He tells me that when his most recent weight reading was almost 300 lbs, he felt very inclined to change is diet and meal routine to help lose weight and improve his health.      With this, he started intermittent fasting and drastically reduced his caloric intake.    Per diet recall, he may be consuming as low as 800-1000kcal/day.   He eats from 9am-1pm and fasts the remainder of the " "day.  He will occasionally have dinner which is around 5pm.    He has cut out most sugars, grains, alcohol, processed foods and fast foods.    He has introduced more fruits and vegetables to his diet which he is proud of.   He tells me that some mornings after fasting, he feels weak and light-headed, thus tries to eat more during the day and extends his eating time frame further.     Diet Recall  Breakfast 1-2 cups fruit, 2 pc sausage, cheese stick, Premire protein shake   Lunch 1 cup raw vegetables, 1 cup fruit, 1 lean meat source   Dinner Skips/fasts or has chicken stir gillespie   Snacks Fruit, trail mix   Beverages Water and gatorade (<6 cups/day)     ANTHROPOMETRICS  Height: 6'2\"  Weight: 276 lbs/125kg  BMI: 35  Weight Status:  Obesity Grade II BMI 35-39.9  IBW: 192 (143%)  Weight History: down 21 lbs x past 1-2 months per pt report (intentional weight loss)  Wt Readings from Last 10 Encounters:   07/01/20 125.2 kg (276 lb)   03/11/20 134.7 kg (297 lb)   02/19/20 131.5 kg (290 lb)   01/23/20 135.6 kg (299 lb)   01/08/20 135.6 kg (299 lb)   12/22/19 131.4 kg (289 lb 11 oz)   11/20/19 131.4 kg (289 lb 9.6 oz)   10/16/19 130.5 kg (287 lb 9.6 oz)   09/10/19 127 kg (280 lb)   09/09/19 127 kg (280 lb)     Dosing Weight: 96kg    Medications/vitamins/minerals/herbals:   Reviewed    Labs:   Labs reviewed    NUTRITION FOCUSED PHYSICAL ASSESSMENT FOR DIAGNOSING MALNUTRITION:  Not observed due to Covid 19 pandemic (phone call only)    ASSESSED NUTRITION NEEDS:  Estimated Energy Needs: 2000 kcals (20 Kcal/Kg)  Justification: obese  Estimated Protein Needs: 100 grams protein (1 g pro/Kg)  Justification: maintenance  Estimated Fluid Needs: 2000  mL   Justification: 1ml/kcal    MALNUTRITION:   % Weight Loss:  Weight loss does not meet criteria for malnutrition   % Intake:  No decreased intake noted  Subcutaneous Fat Loss:  None observed  Muscle Loss:  None observed  Fluid Retention:  None noted    Malnutrition Diagnosis: Patient " does not meet two of the above criteria necessary for diagnosing malnutrition    NUTRITION DIAGNOSIS:  Food and nutrition-related knowledge deficit related to nutrition needs with cancer as evidenced by pt with questions regarding food choices, calorie and protein needs.     INTERVENTIONS  Provided written & verbal education:   - Reviewed diet and behavioral modification for desired wt loss (healthy fasting regimen, portion control, balanced diet, tracking protein intake etc.  Reviewed calorie and protein needs.  Advised Nathan to at least aim for 2000kcal/day and 100g protein/day.  Discussed that rapid weight loss can increase risk for nutrient deficiencies.  Discussed healthy weight loss (1-2 lb/week versus 5 lb/week).     Provided pt with corresponding education materials/handouts on:  Prostate Cancer, Nutrition and Lifestyle, Sources of Protein, 100-calorie snack ideas    Pt verbalize understanding of materials provided during consult.   Patient Understanding: Excellent  Expected Compliance: Excellent     Goals  1.  Aim for 5-6 small frequent meals  2.  Aim for 2000kcal and 100g protein/day  3. Weight loss towards goal weight (210-240 lbs) - no more than 2 lb wt loss /week to prevent malnutrition.       Follow-Up Plans: Pt has RD contact information for questions.    Kimberly Martinez RDN, LD

## 2020-07-08 ENCOUNTER — MYC MEDICAL ADVICE (OUTPATIENT)
Dept: ONCOLOGY | Facility: CLINIC | Age: 70
End: 2020-07-08

## 2020-07-08 DIAGNOSIS — C61 MALIGNANT NEOPLASM OF PROSTATE (H): Primary | ICD-10-CM

## 2020-07-08 RX ORDER — ABIRATERONE ACETATE 250 MG/1
1000 TABLET ORAL DAILY
Qty: 120 TABLET | Refills: 2 | Status: SHIPPED | OUTPATIENT
Start: 2020-07-08 | End: 2020-12-29

## 2020-07-08 RX ORDER — PREDNISONE 5 MG/1
5 TABLET ORAL DAILY
Qty: 30 TABLET | Refills: 2 | Status: SHIPPED | OUTPATIENT
Start: 2020-07-08 | End: 2020-12-29

## 2020-07-13 NOTE — TELEPHONE ENCOUNTER
Spoke with Nathan re: his recent GetMyBoatt message. He states he feels his insurance plan may have changed recently as he has received a bill for scans for $800, which he has never received in the past. He also states he pays $400/year for his abiraterone, but that cost, coupled with new, unanticipated costs for his scans will stress his finances.     Nathan inquired about clinical research trials. Discussed with Nathan he does not currently qualify for a trial because his cancer is not progressing, in fact it has responded well to abiraterone. Mentioned the possibility of stopping his medication for a while and pursue active surveillance to help with financial burden, but he did not seem interested in that option at this time. Nathan stated he was going to reach put to his insurance company today to review most recent invoices. Encouraged Nathan to call/GetMyBoatt if he would like to discuss further with a financial counselor/billing dept.     Encouraged Nathan to call with any additional questions or concerns.     Jessie Robles, SLIMN, RN  RN Care Coordinator  [facility] [place]

## 2020-07-16 ENCOUNTER — TELEPHONE (OUTPATIENT)
Dept: ONCOLOGY | Facility: CLINIC | Age: 70
End: 2020-07-16

## 2020-07-17 NOTE — ORAL ONC MGMT
Oral Chemotherapy Monitoring Program    Primary Oncologist: Dr. Davis  Primary Oncology Clinic: [facility]  Cancer Diagnosis: Prostate Cancer     Therapy History:  4/19/19: start abiraterone 1000 mg ([id number]) daily (with prednisone 5 mg daily)  Cycle 2: 5/9, C3: 6/8, C4: 7/8, C5: 8/7, C6: 9/6, C7: 10/6, C8 11/5, C9 12/5,   C10 1/4/2020, C11 2/3, C12 3/4, C13 4/2, C14 5/2, C15 6/1, C16 7/1, C17 7/31, C18 8/30, C19 9/29, C20 10/29, C21 11/28     Drug Interaction Assessment: no new drug interactions and no new medications.      Lab Monitoring Plan  CMP and CBC [id number]     Subjective/Objective:  Keenan Sprague is a 69 year old male contacted by phone for a follow-up visit for oral chemotherapy.  Nathan continues to tolerate abiraterone and prednisone well. He denies any adverse effects or missed doses of medications. He confirmed taking correct abiraterone dose 4 x 250mg tablets. He will  refill at site [facility] ([facility] pharmacy) 7/17.     ORAL CHEMOTHERAPY 8/29/2019 9/26/2019 11/26/2019 12/26/2019 2/24/2020 3/25/2020 7/17/2020   Drug Name Zytiga (Abiraterone) Zytiga (Abiraterone) Zytiga (Abiraterone) Zytiga (Abiraterone) Zytiga (Abiraterone) Zytiga (Abiraterone) Zytiga (Abiraterone)   Current Dosage 1000mg 1000mg 1000mg 1000mg 1000mg 1000mg 1000mg   Current Schedule Daily Daily Daily Daily Daily Daily Daily   Cycle Details Continuous Continuous Continuous Continuous Continuous Continuous Continuous   Start Date of Last Cycle 8/7/2019 9/6/2019 11/5/2019 12/5/2019 2/3/2020 - 7/1/2020   Planned next cycle start date 9/6/2019 10/6/2019 12/5/2019 1/4/2019 3/4/2020 3/30/2020 7/31/2020   Doses missed in last 2 weeks 0 0 0 0 0 0 0   Adherence Assessment Adherent Adherent Adherent Adherent Adherent Adherent Adherent   Adverse Effects Other (see note for details) No AE identified during assessment No AE identified during assessment No AE identified during assessment No AE identified during assessment Other (see note for  "details) No AE identified during assessment   Other (see note for details) hot flashes - - - - Hotflashes -   Pharmacist intervention? No - - - - Yes -   Intervention(s) - - - - - Patient education -   Any new drug interactions? No No No No No No No   Is the dose as ordered appropriate for the patient? Yes Yes Yes Yes Yes Yes Yes   Is the patient currently in pain? - - - - - No -   Has the patient been assessed within the past 6 months for depression? Yes - - - - - -   Has the patient missed any days of school, work, or other routine activity? No No No No No No No       Last PHQ-2 Score on record:   PHQ-2 ( 1999 [employer]) 3/11/2020 3/5/2019   Q1: Little interest or pleasure in doing things 0 0   Q2: Feeling down, depressed or hopeless 0 0   PHQ-2 Score 0 0   Q1: Little interest or pleasure in doing things Not at all -   Q2: Feeling down, depressed or hopeless Not at all -   PHQ-2 Score 0 -       Patient does not report depression symptoms.      Vitals:  BP:   BP Readings from Last 1 Encounters:   05/13/20 (!) 144/76     Wt Readings from Last 1 Encounters:   07/01/20 125.2 kg (276 lb)     Estimated body surface area is 2.56 meters squared as calculated from the following:    Height as of 3/11/20: 1.88 m (6' 2\").    Weight as of 7/1/20: 125.2 kg (276 lb).    Labs:  _  Result Component Current Result Ref Range   Sodium 138 (6/19/2020) 133 - 144 mmol/L     _  Result Component Current Result Ref Range   Potassium 4.0 (6/19/2020) 3.4 - 5.3 mmol/L     _  Result Component Current Result Ref Range   Calcium 9.2 (6/19/2020) 8.5 - 10.1 mg/dL     No results found for Mag within last 30 days.     No results found for Phos within last 30 days.     _  Result Component Current Result Ref Range   Albumin 3.8 (6/19/2020) 3.4 - 5.0 g/dL     _  Result Component Current Result Ref Range   Urea Nitrogen 14 (6/19/2020) 7 - 30 mg/dL     _  Result Component Current Result Ref Range   Creatinine 0.96 (6/19/2020) 0.66 - 1.25 mg/dL "       _  Result Component Current Result Ref Range   AST 22 (6/19/2020) 0 - 45 U/L     _  Result Component Current Result Ref Range   ALT 28 (6/19/2020) 0 - 70 U/L     _  Result Component Current Result Ref Range   Bilirubin Total 0.4 (6/19/2020) 0.2 - 1.3 mg/dL       _  Result Component Current Result Ref Range   WBC 6.1 (6/19/2020) 4.0 - 11.0 10e9/L     _  Result Component Current Result Ref Range   Hemoglobin 13.4 (6/19/2020) 13.3 - 17.7 g/dL     _  Result Component Current Result Ref Range   Platelet Count 157 (6/19/2020) 150 - 450 10e9/L     _  Result Component Current Result Ref Range   Absolute Neutrophil 3.8 (6/19/2020) 1.6 - 8.3 10e9/L       Assessment:  Nathan is doing well on therapy.    Adherence: PDC = 1.02 with 13 refills over the past 12 months, no concerns with adherence.     Plan:  Continue abiraterone 1000mg daily + prednisone 5mg daily    Follow-Up:  7/22: Dr. Davis appointment     Refill Due:  [facility] to deliver 7/17      Thank you for the opportunity to participate in the care of the above patient,  Zhen Castillo, PharmD  Hematology/Oncology Clinical Pharmacist  [facility]  [facility]  [phone]

## 2020-07-22 ENCOUNTER — VIRTUAL VISIT (OUTPATIENT)
Dept: ONCOLOGY | Facility: CLINIC | Age: 70
End: 2020-07-22
Attending: INTERNAL MEDICINE
Payer: COMMERCIAL

## 2020-07-22 DIAGNOSIS — C61 MALIGNANT NEOPLASM OF PROSTATE (H): Primary | ICD-10-CM

## 2020-07-22 PROCEDURE — 40001009 ZZH VIDEO/TELEPHONE VISIT; NO CHARGE

## 2020-07-22 PROCEDURE — 99214 OFFICE O/P EST MOD 30 MIN: CPT | Mod: TEL | Performed by: INTERNAL MEDICINE
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
## 2020-07-22 NOTE — PROGRESS NOTES
"Keenan Sprague is a 69 year old male who is being evaluated via a billable telephone visit.      The patient has been notified of following:     \"This telephone visit will be conducted via a call between you and your physician/provider. We have found that certain health care needs can be provided without the need for a physical exam.  This service lets us provide the care you need with a short phone conversation.  If a prescription is necessary we can send it directly to your pharmacy.  If lab work is needed we can place an order for that and you can then stop by our lab to have the test done at a later time.    Telephone visits are billed at different rates depending on your insurance coverage. During this emergency period, for some insurers they may be billed the same as an in-person visit.  Please reach out to your insurance provider with any questions.    If during the course of the call the physician/provider feels a telephone visit is not appropriate, you will not be charged for this service.\"    Patient has given verbal consent for Telephone visit?  Yes    What phone number would you like to be contacted at? [phone]    How would you like to obtain your AVS? Lyubov     Vitals - Patient Reported  Weight (Patient Reported): 122.5 kg (270 lb)  Height (Patient Reported): 188 cm (6' 2.02\")  BMI (Based on Pt Reported Ht/Wt): 34.65  Pain Score: No Pain (0)    Jose Alfredo Valencia LPN    Phone call duration: 15 minutes    Jerardo Davis MD  "

## 2020-08-12 ENCOUNTER — ONCOLOGY VISIT (OUTPATIENT)
Dept: ONCOLOGY | Facility: CLINIC | Age: 70
End: 2020-08-12
Attending: INTERNAL MEDICINE
Payer: COMMERCIAL

## 2020-08-12 VITALS
DIASTOLIC BLOOD PRESSURE: 69 MMHG | BODY MASS INDEX: 35.35 KG/M2 | OXYGEN SATURATION: 98 % | TEMPERATURE: 98.8 F | WEIGHT: 275.3 LBS | SYSTOLIC BLOOD PRESSURE: 130 MMHG | HEART RATE: 63 BPM

## 2020-08-12 DIAGNOSIS — C61 MALIGNANT NEOPLASM OF PROSTATE (H): Primary | ICD-10-CM

## 2020-08-12 PROCEDURE — 25000128 H RX IP 250 OP 636: Mod: ZF | Performed by: INTERNAL MEDICINE

## 2020-08-12 PROCEDURE — 96402 CHEMO HORMON ANTINEOPL SQ/IM: CPT

## 2020-08-12 RX ADMIN — LEUPROLIDE ACETATE 22.5 MG: KIT at 09:00

## 2020-08-12 ASSESSMENT — PAIN SCALES - GENERAL: PAINLEVEL: NO PAIN (0)

## 2020-08-12 NOTE — LETTER
2020         RE: Keenan Sprague  [address]  [facility] [address]        Dear Colleague,    Thank you for referring your patient, Keenan Sprague, to the [facility]. Please see a copy of my visit note below.    Patient presents to the [facility] for leuprolide (LUPRON DEPOT) kit 22.5 mg. Order written by Dr. Davis was completed today. Name and  were verified by patient. See MAR for medication details. Patient was asked if they have any new symptoms or questions/concerns. Medication was given in the following site: left ventral gluteal. Patient tolerated injection well and was discharged to home.    -Keri GONZALEZ CMA    Again, thank you for allowing me to participate in the care of your patient.        Sincerely,        [facility]

## 2020-08-12 NOTE — PROGRESS NOTES
Patient presents to the [facility] for leuprolide (LUPRON DEPOT) kit 22.5 mg. Order written by Dr. Davis was completed today. Name and  were verified by patient. See MAR for medication details. Patient was asked if they have any new symptoms or questions/concerns. Medication was given in the following site: left ventral gluteal. Patient tolerated injection well and was discharged to home.    -Keri GONZALEZ, CMA

## 2020-08-26 ENCOUNTER — TELEPHONE (OUTPATIENT)
Dept: ONCOLOGY | Facility: CLINIC | Age: 70
End: 2020-08-26

## 2020-08-26 NOTE — TELEPHONE ENCOUNTER
Tobacco Treatment Program at the [facility] attempted to reach Mr. Sprague on 8/26/2020 regarding the tobacco cessation program to help Mr. Sprague to quit smoking. We will attempt to reach Mr. Sprague another time.     EDY Morrissey  Tobacco Treatment Specialist  PH: [phone]      Pt states he would like to quit smoking in the future. Is currently focused on losing weight and doesn't feel he would be able to focus on quitting smoking and weight loss at the same time. Requested TTS reach out later again this year.

## 2020-09-16 ENCOUNTER — ONCOLOGY VISIT (OUTPATIENT)
Dept: ONCOLOGY | Facility: CLINIC | Age: 70
End: 2020-09-16
Attending: INTERNAL MEDICINE
Payer: COMMERCIAL

## 2020-09-16 ENCOUNTER — PATIENT OUTREACH (OUTPATIENT)
Dept: ONCOLOGY | Facility: CLINIC | Age: 70
End: 2020-09-16

## 2020-09-16 ENCOUNTER — APPOINTMENT (OUTPATIENT)
Dept: LAB | Facility: CLINIC | Age: 70
End: 2020-09-16
Attending: INTERNAL MEDICINE
Payer: COMMERCIAL

## 2020-09-16 VITALS
DIASTOLIC BLOOD PRESSURE: 71 MMHG | OXYGEN SATURATION: 99 % | WEIGHT: 272.7 LBS | BODY MASS INDEX: 35.01 KG/M2 | SYSTOLIC BLOOD PRESSURE: 132 MMHG | HEART RATE: 61 BPM | RESPIRATION RATE: 16 BRPM | TEMPERATURE: 97 F

## 2020-09-16 DIAGNOSIS — C61 MALIGNANT NEOPLASM OF PROSTATE (H): ICD-10-CM

## 2020-09-16 LAB
ALBUMIN SERPL-MCNC: 3.9 G/DL (ref 3.4–5)
ALP SERPL-CCNC: 88 U/L (ref 40–150)
ALT SERPL W P-5'-P-CCNC: 21 U/L (ref 0–70)
ANION GAP SERPL CALCULATED.3IONS-SCNC: 6 MMOL/L (ref 3–14)
AST SERPL W P-5'-P-CCNC: 16 U/L (ref 0–45)
BASOPHILS # BLD AUTO: 0 10E9/L (ref 0–0.2)
BASOPHILS NFR BLD AUTO: 0.5 %
BILIRUB SERPL-MCNC: 0.7 MG/DL (ref 0.2–1.3)
BUN SERPL-MCNC: 18 MG/DL (ref 7–30)
CALCIUM SERPL-MCNC: 9.6 MG/DL (ref 8.5–10.1)
CHLORIDE SERPL-SCNC: 103 MMOL/L (ref 94–109)
CO2 SERPL-SCNC: 25 MMOL/L (ref 20–32)
CREAT SERPL-MCNC: 0.82 MG/DL (ref 0.66–1.25)
DIFFERENTIAL METHOD BLD: NORMAL
EOSINOPHIL # BLD AUTO: 0.2 10E9/L (ref 0–0.7)
EOSINOPHIL NFR BLD AUTO: 3.2 %
ERYTHROCYTE [DISTWIDTH] IN BLOOD BY AUTOMATED COUNT: 13.2 % (ref 10–15)
GFR SERPL CREATININE-BSD FRML MDRD: >90 ML/MIN/{1.73_M2}
GLUCOSE SERPL-MCNC: 92 MG/DL (ref 70–99)
HCT VFR BLD AUTO: 41.3 % (ref 40–53)
HGB BLD-MCNC: 13.8 G/DL (ref 13.3–17.7)
IMM GRANULOCYTES # BLD: 0 10E9/L (ref 0–0.4)
IMM GRANULOCYTES NFR BLD: 0.4 %
LYMPHOCYTES # BLD AUTO: 1.6 10E9/L (ref 0.8–5.3)
LYMPHOCYTES NFR BLD AUTO: 27.7 %
MCH RBC QN AUTO: 30.9 PG (ref 26.5–33)
MCHC RBC AUTO-ENTMCNC: 33.4 G/DL (ref 31.5–36.5)
MCV RBC AUTO: 92 FL (ref 78–100)
MONOCYTES # BLD AUTO: 0.4 10E9/L (ref 0–1.3)
MONOCYTES NFR BLD AUTO: 7.1 %
NEUTROPHILS # BLD AUTO: 3.5 10E9/L (ref 1.6–8.3)
NEUTROPHILS NFR BLD AUTO: 61.1 %
NRBC # BLD AUTO: 0 10*3/UL
NRBC BLD AUTO-RTO: 0 /100
PLATELET # BLD AUTO: 179 10E9/L (ref 150–450)
POTASSIUM SERPL-SCNC: 3.6 MMOL/L (ref 3.4–5.3)
PROT SERPL-MCNC: 7.6 G/DL (ref 6.8–8.8)
PSA SERPL-MCNC: <0.01 UG/L (ref 0–4)
RBC # BLD AUTO: 4.47 10E12/L (ref 4.4–5.9)
SODIUM SERPL-SCNC: 135 MMOL/L (ref 133–144)
WBC # BLD AUTO: 5.6 10E9/L (ref 4–11)

## 2020-09-16 PROCEDURE — 99214 OFFICE O/P EST MOD 30 MIN: CPT | Mod: ZP | Performed by: INTERNAL MEDICINE

## 2020-09-16 PROCEDURE — 80053 COMPREHEN METABOLIC PANEL: CPT | Performed by: INTERNAL MEDICINE

## 2020-09-16 PROCEDURE — 84403 ASSAY OF TOTAL TESTOSTERONE: CPT | Performed by: INTERNAL MEDICINE

## 2020-09-16 PROCEDURE — 36415 COLL VENOUS BLD VENIPUNCTURE: CPT

## 2020-09-16 PROCEDURE — 84153 ASSAY OF PSA TOTAL: CPT | Performed by: INTERNAL MEDICINE

## 2020-09-16 PROCEDURE — G0463 HOSPITAL OUTPT CLINIC VISIT: HCPCS

## 2020-09-16 PROCEDURE — 85025 COMPLETE CBC W/AUTO DIFF WBC: CPT

## 2020-09-16 ASSESSMENT — PAIN SCALES - GENERAL: PAINLEVEL: NO PAIN (0)

## 2020-09-16 NOTE — NURSING NOTE
Chief Complaint   Patient presents with     Blood Draw     labs drawn with vpt by rn.  vs taken     Labs drawn with vpt by rn.  Pt tolerated well.  VS taken.  Pt checked in for next appt.    Lashell Portillo RN
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
## 2020-09-16 NOTE — NURSING NOTE
"Oncology Rooming Note    September 16, 2020 7:51 AM   Keenan Sprague is a 69 year old male who presents for:    Chief Complaint   Patient presents with     Blood Draw     labs drawn with vpt by rn.  vs taken     Oncology Clinic Visit     Pt is here for a rtn for Prostate Cancer     Initial Vitals: Blood Pressure 132/71 (BP Location: Right arm, Patient Position: Sitting, Cuff Size: Adult Large)   Pulse 61   Temperature 97  F (36.1  C) (Tympanic)   Respiration 16   Weight 123.7 kg (272 lb 11.2 oz)   Oxygen Saturation 99%   Body Mass Index 35.01 kg/m   Estimated body mass index is 35.01 kg/m  as calculated from the following:    Height as of 3/11/20: 1.88 m (6' 2\").    Weight as of this encounter: 123.7 kg (272 lb 11.2 oz). Body surface area is 2.54 meters squared.  No Pain (0) Comment: Data Unavailable   No LMP for male patient.  Allergies reviewed: Yes  Medications reviewed: Yes    Medications: Medication refills not needed today.  Pharmacy name entered into [facility]:    [facility] - [place], [place] - [address]  [facility]  [facility] - [place], [place] - [address]  [id number] - [place], [place] - [address] [facility] [address]  [facility] - [place], [place] - [address] [facility]    Clinical concerns: none       Shabnam Cunningham MA            "
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
## 2020-09-18 ENCOUNTER — PATIENT OUTREACH (OUTPATIENT)
Dept: ONCOLOGY | Facility: CLINIC | Age: 70
End: 2020-09-18

## 2020-09-18 NOTE — PROGRESS NOTES
L/M for Nathan re: is abiraterone. Informed him that, per per our Oral Chemo Finance Team,  his insurance will only cover a one month supply, not the three month supply he was requesting. Encouraged him to call his insurance company if he had any additional questions re: his coverage.     Jessie Robles, SLIMN, RN  RN Care Coordinator  [facility]

## 2020-09-30 NOTE — NURSING NOTE
Chief Complaint   Patient presents with     Blood Draw     labs drawn via venipuncture by RN in lab     /77 (BP Location: Left arm, Patient Position: Chair, Cuff Size: Adult Large)   Pulse 85   Temp 98.6  F (37  C) (Oral)   Resp 18   Wt 131.5 kg (290 lb)   SpO2 95%   BMI 37.23 kg/m      Labs collected and sent from left antecubital venipuncture in lab by RN. Pt tolerated well.   Pt checked in for next appointment.    Joyce Diaz RN     Erivedge Counseling- I discussed with the patient the risks of Erivedge including but not limited to nausea, vomiting, diarrhea, constipation, weight loss, changes in the sense of taste, decreased appetite, muscle spasms, and hair loss.  The patient verbalized understanding of the proper use and possible adverse effects of Erivedge.  All of the patient's questions and concerns were addressed.

## 2020-10-01 DIAGNOSIS — C61 MALIGNANT NEOPLASM OF PROSTATE (H): Primary | ICD-10-CM

## 2020-10-01 RX ORDER — ABIRATERONE ACETATE 250 MG/1
1000 TABLET ORAL DAILY
Qty: 120 TABLET | Refills: 2 | Status: SHIPPED | OUTPATIENT
Start: 2020-10-01 | End: 2020-12-29

## 2020-10-01 RX ORDER — ABIRATERONE ACETATE 250 MG/1
1000 TABLET ORAL DAILY
Qty: 120 TABLET | Refills: 2 | Status: CANCELLED | OUTPATIENT
Start: 2020-10-06

## 2020-10-01 RX ORDER — PREDNISONE 5 MG/1
5 TABLET ORAL DAILY
Qty: 30 TABLET | Refills: 2 | Status: CANCELLED | OUTPATIENT
Start: 2020-10-06

## 2020-10-01 RX ORDER — PREDNISONE 5 MG/1
5 TABLET ORAL DAILY
Qty: 30 TABLET | Refills: 2 | Status: SHIPPED | OUTPATIENT
Start: 2020-10-01 | End: 2020-12-29

## 2020-10-12 ENCOUNTER — TELEPHONE (OUTPATIENT)
Dept: ONCOLOGY | Facility: CLINIC | Age: 70
End: 2020-10-12

## 2020-10-12 NOTE — TELEPHONE ENCOUNTER
Oral Chemotherapy Monitoring Program    Primary Oncologist: Dr. Davis  Primary Oncology Clinic: [facility]  Cancer Diagnosis: Prostate Cancer     Therapy History:  4/19/19: start abiraterone 1000 mg ([id number]) daily (with prednisone 5 mg daily)  Cycle 2: 5/9, C3: 6/8, C4: 7/8, C5: 8/7, C6: 9/6, C7: 10/6, C8 11/5, C9 12/5,   C10 1/4/2020, C11 2/3, C12 3/4, C13 4/2, C14 5/2, C15 6/1, C16 7/1, C17 7/31, C18 8/30, C19 9/29, C20 10/29, C21 11/28     Drug Interaction Assessment: no new drug interactions and no new medications.      Lab Monitoring Plan  CMP and CBC [id number]    Subjective/Objective:  Keenan Sprague is a 69 year old male contacted by phone for a follow-up visit for oral chemotherapy.  Nathan confirms taking Zytiga 1000 mg daily on an empty stomach in addition to prednisone 5 mg daily. He denies new or worsening side effects, medication changes, or recent ED or hospital visits. He denies any missed doses, endorses that his therapy is going well and denies any issues or concerns.     ORAL CHEMOTHERAPY 9/26/2019 11/26/2019 12/26/2019 2/24/2020 3/25/2020 7/17/2020 10/12/2020   Drug Name Zytiga (Abiraterone) Zytiga (Abiraterone) Zytiga (Abiraterone) Zytiga (Abiraterone) Zytiga (Abiraterone) Zytiga (Abiraterone) Zytiga (Abiraterone)   Current Dosage 1000mg 1000mg 1000mg 1000mg 1000mg 1000mg 1000mg   Current Schedule Daily Daily Daily Daily Daily Daily Daily   Cycle Details Continuous Continuous Continuous Continuous Continuous Continuous Continuous   Start Date of Last Cycle 9/6/2019 11/5/2019 12/5/2019 2/3/2020 - 7/1/2020 -   Planned next cycle start date 10/6/2019 12/5/2019 1/4/2019 3/4/2020 3/30/2020 7/31/2020 -   Doses missed in last 2 weeks 0 0 0 0 0 0 0   Adherence Assessment Adherent Adherent Adherent Adherent Adherent Adherent Adherent   Adverse Effects No AE identified during assessment No AE identified during assessment No AE identified during assessment No AE identified during assessment Other (see  "note for details) No AE identified during assessment No AE identified during assessment   Other (see note for details) - - - - Hotflashes - -   Pharmacist intervention? - - - - Yes - -   Intervention(s) - - - - Patient education - -   Any new drug interactions? No No No No No No No   Is the dose as ordered appropriate for the patient? Yes Yes Yes Yes Yes Yes Yes   Is the patient currently in pain? - - - - No - -   Has the patient been assessed within the past 6 months for depression? - - - - - - -   Has the patient missed any days of school, work, or other routine activity? No No No No No No -       Last PHQ-2 Score on record:   PHQ-2 ( 1999 [employer]) 3/11/2020 3/5/2019   Q1: Little interest or pleasure in doing things 0 0   Q2: Feeling down, depressed or hopeless 0 0   PHQ-2 Score 0 0   Q1: Little interest or pleasure in doing things Not at all -   Q2: Feeling down, depressed or hopeless Not at all -   PHQ-2 Score 0 -       Vitals:  BP:   BP Readings from Last 1 Encounters:   09/16/20 132/71     Wt Readings from Last 1 Encounters:   09/16/20 123.7 kg (272 lb 11.2 oz)     Estimated body surface area is 2.54 meters squared as calculated from the following:    Height as of 3/11/20: 1.88 m (6' 2\").    Weight as of 9/16/20: 123.7 kg (272 lb 11.2 oz).    Labs:  _  Result Component Current Result Ref Range   Sodium 135 (9/16/2020) 133 - 144 mmol/L     _  Result Component Current Result Ref Range   Potassium 3.6 (9/16/2020) 3.4 - 5.3 mmol/L     _  Result Component Current Result Ref Range   Calcium 9.6 (9/16/2020) 8.5 - 10.1 mg/dL     No results found for Mag within last 30 days.     No results found for Phos within last 30 days.     _  Result Component Current Result Ref Range   Albumin 3.9 (9/16/2020) 3.4 - 5.0 g/dL     _  Result Component Current Result Ref Range   Urea Nitrogen 18 (9/16/2020) 7 - 30 mg/dL     _  Result Component Current Result Ref Range   Creatinine 0.82 (9/16/2020) 0.66 - 1.25 mg/dL       _  Result " Component Current Result Ref Range   AST 16 (9/16/2020) 0 - 45 U/L     _  Result Component Current Result Ref Range   ALT 21 (9/16/2020) 0 - 70 U/L     _  Result Component Current Result Ref Range   Bilirubin Total 0.7 (9/16/2020) 0.2 - 1.3 mg/dL       _  Result Component Current Result Ref Range   WBC 5.6 (9/16/2020) 4.0 - 11.0 10e9/L     _  Result Component Current Result Ref Range   Hemoglobin 13.8 (9/16/2020) 13.3 - 17.7 g/dL     _  Result Component Current Result Ref Range   Platelet Count 179 (9/16/2020) 150 - 450 10e9/L     _  Result Component Current Result Ref Range   Absolute Neutrophil 3.5 (9/16/2020) 1.6 - 8.3 10e9/L         Assessment:  Nathan continues to tolerate Zytiga/Prednisone without issues.   PDC=1.00, no concerns with adherence.     Plan:  Continue current therapy.     Follow-Up:  10/28: Dr. Davis appt and labs    Refill Due:  [facility] will send order to [facility] for pick-up at retail on 10/13.     Jackie Madrid, PharmD  Hematology/Oncology Clinical Pharmacist  [facility]  [facility]  [phone]

## 2020-10-28 ENCOUNTER — ONCOLOGY VISIT (OUTPATIENT)
Dept: ONCOLOGY | Facility: CLINIC | Age: 70
End: 2020-10-28
Attending: INTERNAL MEDICINE
Payer: COMMERCIAL

## 2020-10-28 VITALS
TEMPERATURE: 98.4 F | OXYGEN SATURATION: 98 % | SYSTOLIC BLOOD PRESSURE: 143 MMHG | WEIGHT: 279.3 LBS | HEART RATE: 52 BPM | RESPIRATION RATE: 18 BRPM | DIASTOLIC BLOOD PRESSURE: 90 MMHG | BODY MASS INDEX: 35.86 KG/M2

## 2020-10-28 DIAGNOSIS — I26.99 BILATERAL PULMONARY EMBOLISM (H): ICD-10-CM

## 2020-10-28 DIAGNOSIS — E66.01 MORBID OBESITY (H): ICD-10-CM

## 2020-10-28 DIAGNOSIS — C61 MALIGNANT NEOPLASM OF PROSTATE (H): ICD-10-CM

## 2020-10-28 DIAGNOSIS — C61 MALIGNANT NEOPLASM OF PROSTATE (H): Primary | ICD-10-CM

## 2020-10-28 LAB
ALBUMIN SERPL-MCNC: 3.5 G/DL (ref 3.4–5)
ALP SERPL-CCNC: 72 U/L (ref 40–150)
ALT SERPL W P-5'-P-CCNC: 17 U/L (ref 0–70)
ANION GAP SERPL CALCULATED.3IONS-SCNC: 4 MMOL/L (ref 3–14)
AST SERPL W P-5'-P-CCNC: 14 U/L (ref 0–45)
BASOPHILS # BLD AUTO: 0 10E9/L (ref 0–0.2)
BASOPHILS NFR BLD AUTO: 0.6 %
BILIRUB SERPL-MCNC: 0.4 MG/DL (ref 0.2–1.3)
BUN SERPL-MCNC: 18 MG/DL (ref 7–30)
CALCIUM SERPL-MCNC: 8.9 MG/DL (ref 8.5–10.1)
CHLORIDE SERPL-SCNC: 111 MMOL/L (ref 94–109)
CHOLEST SERPL-MCNC: 176 MG/DL
CO2 SERPL-SCNC: 25 MMOL/L (ref 20–32)
CREAT SERPL-MCNC: 0.81 MG/DL (ref 0.66–1.25)
DIFFERENTIAL METHOD BLD: ABNORMAL
EOSINOPHIL # BLD AUTO: 0.2 10E9/L (ref 0–0.7)
EOSINOPHIL NFR BLD AUTO: 3.6 %
ERYTHROCYTE [DISTWIDTH] IN BLOOD BY AUTOMATED COUNT: 13.2 % (ref 10–15)
GFR SERPL CREATININE-BSD FRML MDRD: >90 ML/MIN/{1.73_M2}
GLUCOSE SERPL-MCNC: 96 MG/DL (ref 70–99)
HCT VFR BLD AUTO: 38 % (ref 40–53)
HDLC SERPL-MCNC: 54 MG/DL
HGB BLD-MCNC: 12.7 G/DL (ref 13.3–17.7)
IMM GRANULOCYTES # BLD: 0 10E9/L (ref 0–0.4)
IMM GRANULOCYTES NFR BLD: 0.2 %
LDLC SERPL CALC-MCNC: 109 MG/DL
LYMPHOCYTES # BLD AUTO: 1.2 10E9/L (ref 0.8–5.3)
LYMPHOCYTES NFR BLD AUTO: 25.2 %
MCH RBC QN AUTO: 30.5 PG (ref 26.5–33)
MCHC RBC AUTO-ENTMCNC: 33.4 G/DL (ref 31.5–36.5)
MCV RBC AUTO: 91 FL (ref 78–100)
MONOCYTES # BLD AUTO: 0.3 10E9/L (ref 0–1.3)
MONOCYTES NFR BLD AUTO: 6.1 %
NEUTROPHILS # BLD AUTO: 3.1 10E9/L (ref 1.6–8.3)
NEUTROPHILS NFR BLD AUTO: 64.3 %
NONHDLC SERPL-MCNC: 122 MG/DL
NRBC # BLD AUTO: 0 10*3/UL
NRBC BLD AUTO-RTO: 0 /100
PLATELET # BLD AUTO: 159 10E9/L (ref 150–450)
POTASSIUM SERPL-SCNC: 3.9 MMOL/L (ref 3.4–5.3)
PROT SERPL-MCNC: 6.8 G/DL (ref 6.8–8.8)
PSA SERPL-MCNC: <0.01 UG/L (ref 0–4)
RBC # BLD AUTO: 4.17 10E12/L (ref 4.4–5.9)
SODIUM SERPL-SCNC: 141 MMOL/L (ref 133–144)
TRIGL SERPL-MCNC: 64 MG/DL
WBC # BLD AUTO: 4.8 10E9/L (ref 4–11)

## 2020-10-28 PROCEDURE — 86316 IMMUNOASSAY TUMOR OTHER: CPT | Performed by: INTERNAL MEDICINE

## 2020-10-28 PROCEDURE — 36415 COLL VENOUS BLD VENIPUNCTURE: CPT

## 2020-10-28 PROCEDURE — 84403 ASSAY OF TOTAL TESTOSTERONE: CPT | Performed by: INTERNAL MEDICINE

## 2020-10-28 PROCEDURE — 80053 COMPREHEN METABOLIC PANEL: CPT | Performed by: INTERNAL MEDICINE

## 2020-10-28 PROCEDURE — 85025 COMPLETE CBC W/AUTO DIFF WBC: CPT | Performed by: INTERNAL MEDICINE

## 2020-10-28 PROCEDURE — 80061 LIPID PANEL: CPT | Performed by: INTERNAL MEDICINE

## 2020-10-28 PROCEDURE — 84153 ASSAY OF PSA TOTAL: CPT | Performed by: INTERNAL MEDICINE

## 2020-10-28 PROCEDURE — 99214 OFFICE O/P EST MOD 30 MIN: CPT | Performed by: INTERNAL MEDICINE

## 2020-10-28 PROCEDURE — G0463 HOSPITAL OUTPT CLINIC VISIT: HCPCS

## 2020-10-28 PROCEDURE — 84999 UNLISTED CHEMISTRY PROCEDURE: CPT | Performed by: INTERNAL MEDICINE

## 2020-10-28 ASSESSMENT — PAIN SCALES - GENERAL: PAINLEVEL: NO PAIN (0)
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
## 2020-10-28 NOTE — NURSING NOTE
"Oncology Rooming Note    October 28, 2020 8:02 AM   Keenan Sprague is a 70 year old male who presents for:    Chief Complaint   Patient presents with     Blood Draw     JI labs drawn by RN in lab. No lab orders. Vitals taken.      Oncology Clinic Visit     Return; Prostate Ca     Initial Vitals: BP (!) 143/90 (BP Location: Left arm, Patient Position: Sitting, Cuff Size: Adult Large)   Pulse 52   Temp 98.4  F (36.9  C) (Oral)   Resp 18   Wt 126.7 kg (279 lb 4.8 oz)   SpO2 98%   BMI 35.86 kg/m   Estimated body mass index is 35.86 kg/m  as calculated from the following:    Height as of 3/11/20: 1.88 m (6' 2\").    Weight as of this encounter: 126.7 kg (279 lb 4.8 oz). Body surface area is 2.57 meters squared.  No Pain (0) Comment: Data Unavailable   No LMP for male patient.  Allergies reviewed: Yes  Medications reviewed: Yes    Medications: Medication refills not needed today.  Pharmacy name entered into [facility]:    [facility] - [place], [place] - [address]  [facility]  [facility] - [place], [place] - [address]  [id number] - [place], [place] - [address]  [facility] - [place], [place] - [address] [facility]              "

## 2020-10-28 NOTE — NURSING NOTE
Chief Complaint   Patient presents with     Blood Draw     JIC labs drawn by RN in lab. No lab orders. Vitals taken.      Oncology Clinic Visit     Return; Prostate Ca     BP (!) 143/90 (BP Location: Left arm, Patient Position: Sitting, Cuff Size: Adult Large)   Pulse 52   Temp 98.4  F (36.9  C) (Oral)   Resp 18   Wt 126.7 kg (279 lb 4.8 oz)   SpO2 98%   BMI 35.86 kg/m      Labs drawn via right antecubital venipuncture. Pt tolerated well & checked in for next appointment. No labs ordered JIC tubes sent. (red,red-gel, green, purple)    Ginger Delatorre RN on 10/28/2020 at 8:01 AM
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
## 2020-10-29 LAB — TESTOST SERPL-MCNC: <2 NG/DL (ref 240–950)

## 2020-11-03 LAB
RESULT: NORMAL
SEND OUTS MISC TEST CODE: NORMAL
SEND OUTS MISC TEST SPECIMEN: NORMAL
TEST NAME: NORMAL

## 2020-11-22 ENCOUNTER — HEALTH MAINTENANCE LETTER (OUTPATIENT)
Age: 70
End: 2020-11-22

## 2020-12-04 ENCOUNTER — HOSPITAL ENCOUNTER (OUTPATIENT)
Dept: NUCLEAR MEDICINE | Facility: CLINIC | Age: 70
Setting detail: NUCLEAR MEDICINE
End: 2020-12-04
Attending: INTERNAL MEDICINE
Payer: COMMERCIAL

## 2020-12-04 ENCOUNTER — HOSPITAL ENCOUNTER (OUTPATIENT)
Dept: LAB | Facility: CLINIC | Age: 70
End: 2020-12-04
Attending: STUDENT IN AN ORGANIZED HEALTH CARE EDUCATION/TRAINING PROGRAM
Payer: COMMERCIAL

## 2020-12-04 ENCOUNTER — HOSPITAL ENCOUNTER (OUTPATIENT)
Dept: CT IMAGING | Facility: CLINIC | Age: 70
End: 2020-12-04
Attending: INTERNAL MEDICINE
Payer: COMMERCIAL

## 2020-12-04 DIAGNOSIS — E66.01 MORBID OBESITY (H): ICD-10-CM

## 2020-12-04 DIAGNOSIS — C61 MALIGNANT NEOPLASM OF PROSTATE (H): ICD-10-CM

## 2020-12-04 LAB
ALBUMIN SERPL-MCNC: 3.8 G/DL (ref 3.4–5)
ALP SERPL-CCNC: 86 U/L (ref 40–150)
ALT SERPL W P-5'-P-CCNC: 22 U/L (ref 0–70)
ANION GAP SERPL CALCULATED.3IONS-SCNC: 5 MMOL/L (ref 3–14)
AST SERPL W P-5'-P-CCNC: 17 U/L (ref 0–45)
BASOPHILS # BLD AUTO: 0 10E9/L (ref 0–0.2)
BASOPHILS NFR BLD AUTO: 0.3 %
BILIRUB SERPL-MCNC: 0.3 MG/DL (ref 0.2–1.3)
BUN SERPL-MCNC: 20 MG/DL (ref 7–30)
CALCIUM SERPL-MCNC: 9.3 MG/DL (ref 8.5–10.1)
CHLORIDE SERPL-SCNC: 107 MMOL/L (ref 94–109)
CHOLEST SERPL-MCNC: 217 MG/DL
CO2 SERPL-SCNC: 29 MMOL/L (ref 20–32)
CREAT SERPL-MCNC: 0.96 MG/DL (ref 0.66–1.25)
DIFFERENTIAL METHOD BLD: ABNORMAL
EOSINOPHIL # BLD AUTO: 0.2 10E9/L (ref 0–0.7)
EOSINOPHIL NFR BLD AUTO: 4.1 %
ERYTHROCYTE [DISTWIDTH] IN BLOOD BY AUTOMATED COUNT: 13.8 % (ref 10–15)
GFR SERPL CREATININE-BSD FRML MDRD: 81 ML/MIN/{1.73_M2}
GLUCOSE SERPL-MCNC: 96 MG/DL (ref 70–99)
HCT VFR BLD AUTO: 39.6 % (ref 40–53)
HDLC SERPL-MCNC: 55 MG/DL
HGB BLD-MCNC: 13.3 G/DL (ref 13.3–17.7)
IMM GRANULOCYTES # BLD: 0 10E9/L (ref 0–0.4)
IMM GRANULOCYTES NFR BLD: 0.2 %
LDLC SERPL CALC-MCNC: 144 MG/DL
LYMPHOCYTES # BLD AUTO: 2 10E9/L (ref 0.8–5.3)
LYMPHOCYTES NFR BLD AUTO: 34 %
MCH RBC QN AUTO: 30.9 PG (ref 26.5–33)
MCHC RBC AUTO-ENTMCNC: 33.6 G/DL (ref 31.5–36.5)
MCV RBC AUTO: 92 FL (ref 78–100)
MONOCYTES # BLD AUTO: 0.1 10E9/L (ref 0–1.3)
MONOCYTES NFR BLD AUTO: 1.7 %
NEUTROPHILS # BLD AUTO: 3.5 10E9/L (ref 1.6–8.3)
NEUTROPHILS NFR BLD AUTO: 59.7 %
NONHDLC SERPL-MCNC: 162 MG/DL
NRBC # BLD AUTO: 0 10*3/UL
NRBC BLD AUTO-RTO: 0 /100
PLATELET # BLD AUTO: 166 10E9/L (ref 150–450)
POTASSIUM SERPL-SCNC: 4.4 MMOL/L (ref 3.4–5.3)
PROT SERPL-MCNC: 7.3 G/DL (ref 6.8–8.8)
PSA SERPL-MCNC: <0.01 UG/L (ref 0–4)
RBC # BLD AUTO: 4.3 10E12/L (ref 4.4–5.9)
SODIUM SERPL-SCNC: 141 MMOL/L (ref 133–144)
TRIGL SERPL-MCNC: 89 MG/DL
WBC # BLD AUTO: 5.9 10E9/L (ref 4–11)

## 2020-12-04 PROCEDURE — 84403 ASSAY OF TOTAL TESTOSTERONE: CPT | Performed by: INTERNAL MEDICINE

## 2020-12-04 PROCEDURE — 250N000009 HC RX 250: Performed by: INTERNAL MEDICINE

## 2020-12-04 PROCEDURE — 84153 ASSAY OF PSA TOTAL: CPT | Performed by: INTERNAL MEDICINE

## 2020-12-04 PROCEDURE — 250N000011 HC RX IP 250 OP 636: Performed by: INTERNAL MEDICINE

## 2020-12-04 PROCEDURE — 343N000001 HC RX 343: Performed by: INTERNAL MEDICINE

## 2020-12-04 PROCEDURE — 71260 CT THORAX DX C+: CPT

## 2020-12-04 PROCEDURE — 80053 COMPREHEN METABOLIC PANEL: CPT | Performed by: INTERNAL MEDICINE

## 2020-12-04 PROCEDURE — 85025 COMPLETE CBC W/AUTO DIFF WBC: CPT | Performed by: INTERNAL MEDICINE

## 2020-12-04 PROCEDURE — 78306 BONE IMAGING WHOLE BODY: CPT

## 2020-12-04 PROCEDURE — 86316 IMMUNOASSAY TUMOR OTHER: CPT | Performed by: INTERNAL MEDICINE

## 2020-12-04 PROCEDURE — 80061 LIPID PANEL: CPT | Performed by: INTERNAL MEDICINE

## 2020-12-04 PROCEDURE — A9503 TC99M MEDRONATE: HCPCS | Performed by: INTERNAL MEDICINE

## 2020-12-04 RX ORDER — IOPAMIDOL 755 MG/ML
135 INJECTION, SOLUTION INTRAVASCULAR ONCE
Status: COMPLETED | OUTPATIENT
Start: 2020-12-04 | End: 2020-12-04

## 2020-12-04 RX ORDER — TC 99M MEDRONATE 20 MG/10ML
25 INJECTION, POWDER, LYOPHILIZED, FOR SOLUTION INTRAVENOUS ONCE
Status: COMPLETED | OUTPATIENT
Start: 2020-12-04 | End: 2020-12-04

## 2020-12-04 RX ADMIN — SODIUM CHLORIDE 79 ML: 9 INJECTION, SOLUTION INTRAVENOUS at 07:33

## 2020-12-04 RX ADMIN — IOPAMIDOL 135 ML: 755 INJECTION, SOLUTION INTRAVENOUS at 07:32

## 2020-12-04 RX ADMIN — TC 99M MEDRONATE 25 MCI.: 20 INJECTION, POWDER, LYOPHILIZED, FOR SOLUTION INTRAVENOUS at 07:50

## 2020-12-05 LAB — CGA SERPL-MCNC: 65 NG/ML (ref 0–103)

## 2020-12-08 ENCOUNTER — ONCOLOGY VISIT (OUTPATIENT)
Dept: ONCOLOGY | Facility: CLINIC | Age: 70
End: 2020-12-08
Attending: INTERNAL MEDICINE
Payer: COMMERCIAL

## 2020-12-08 VITALS
RESPIRATION RATE: 18 BRPM | WEIGHT: 278 LBS | DIASTOLIC BLOOD PRESSURE: 78 MMHG | SYSTOLIC BLOOD PRESSURE: 168 MMHG | HEART RATE: 60 BPM | OXYGEN SATURATION: 98 % | BODY MASS INDEX: 35.69 KG/M2 | TEMPERATURE: 97 F

## 2020-12-08 DIAGNOSIS — C61 MALIGNANT NEOPLASM OF PROSTATE (H): Primary | ICD-10-CM

## 2020-12-08 LAB — TESTOST SERPL-MCNC: 2 NG/DL (ref 240–950)

## 2020-12-08 PROCEDURE — 96402 CHEMO HORMON ANTINEOPL SQ/IM: CPT

## 2020-12-08 PROCEDURE — 99214 OFFICE O/P EST MOD 30 MIN: CPT | Performed by: INTERNAL MEDICINE

## 2020-12-08 PROCEDURE — 250N000011 HC RX IP 250 OP 636: Performed by: INTERNAL MEDICINE

## 2020-12-08 RX ADMIN — LEUPROLIDE ACETATE 45 MG: KIT SUBCUTANEOUS at 13:39
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
## 2020-12-08 NOTE — NURSING NOTE
Pt received his Eligard injection today with no concerns.  Pt tolerated the injection well.  See TRENT Cunningham MA

## 2020-12-08 NOTE — NURSING NOTE
"Oncology Rooming Note    December 8, 2020 12:32 PM   Keenan Sprague is a 70 year old male who presents for:    Chief Complaint   Patient presents with     Oncology Clinic Visit     Pt is here for a rtn for Prostate Cancer     Initial Vitals: Blood Pressure (Abnormal) 168/78   Pulse 60   Temperature 97  F (36.1  C) (Tympanic)   Respiration 18   Weight 126.1 kg (278 lb)   Oxygen Saturation 98%   Body Mass Index 35.69 kg/m   Estimated body mass index is 35.69 kg/m  as calculated from the following:    Height as of 3/11/20: 1.88 m (6' 2\").    Weight as of this encounter: 126.1 kg (278 lb). Body surface area is 2.57 meters squared.  Data Unavailable Comment: Data Unavailable   No LMP for male patient.  Allergies reviewed: Yes  Medications reviewed: Yes    Medications: Medication refills not needed today.  Pharmacy name entered into [employer]:    [facility] [place], [place] - [address]  [facility]  [facility] - [place], [place] - [address]  [id number] - [place], [place] - [address]  [facility] - [place], [place] - [address] [address]    Clinical concerns: none       Shabnam Cunningham MA            "
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
## 2020-12-29 DIAGNOSIS — C61 MALIGNANT NEOPLASM OF PROSTATE (H): Primary | ICD-10-CM

## 2020-12-29 RX ORDER — PREDNISONE 5 MG/1
5 TABLET ORAL DAILY
Qty: 30 TABLET | Refills: 2 | Status: SHIPPED | OUTPATIENT
Start: 2020-12-29 | End: 2021-03-29

## 2020-12-29 RX ORDER — ABIRATERONE ACETATE 250 MG/1
1000 TABLET ORAL DAILY
Qty: 120 TABLET | Refills: 2 | Status: SHIPPED | OUTPATIENT
Start: 2020-12-29 | End: 2021-03-29

## 2021-01-22 ENCOUNTER — TELEPHONE (OUTPATIENT)
Dept: ONCOLOGY | Facility: CLINIC | Age: 71
End: 2021-01-22

## 2021-01-22 NOTE — ORAL ONC MGMT
Oral Chemotherapy Monitoring Program    Subjective/Objective:  Keenan Sprague is a 70 year old male contacted by phone for a follow-up visit for oral chemotherapy. Nathan confirms taking abiraterone 1000 mg daily with prednisone. He is tolerating this well, with no new or worsening adverse effects. Denies any recent medication changes or missed doses of abiraterone (PDC 0.99).    ORAL CHEMOTHERAPY 12/26/2019 2/24/2020 3/25/2020 7/17/2020 10/12/2020 12/29/2020 1/22/2021   Assessment Type - - - - - Refill Quarterly Follow up   Diagnosis Code - - - - - Prostate Cancer Prostate Cancer   Providers - - - - - Dr. Ryan Davis   Clinic Name/Location - - - - - [facility] -   Drug Name Zytiga (Abiraterone) Zytiga (Abiraterone) Zytiga (Abiraterone) Zytiga (Abiraterone) Zytiga (Abiraterone) Zytiga (Abiraterone) Zytiga (Abiraterone)   Dose - - - - - 1,000 mg 1,000 mg   Current Schedule Daily Daily Daily Daily Daily Daily Daily   Cycle Details Continuous Continuous Continuous Continuous Continuous Continuous Continuous   Start Date of Last Cycle 12/5/2019 2/3/2020 - 7/1/2020 - - -   Planned next cycle start date 1/4/2019 3/4/2020 3/30/2020 7/31/2020 - - -   Doses missed in last 2 weeks 0 0 0 0 0 - 0   Adherence Assessment Adherent Adherent Adherent Adherent Adherent - Adherent   Adverse Effects No AE identified during assessment No AE identified during assessment Other (see note for details) No AE identified during assessment No AE identified during assessment - No AE identified during assessment   Other (See Note for Details) - - Hotflashes - - - -   Pharmacist intervention(other) - - Yes - - - -   Any new drug interactions? No No No No No - No   Is the dose as ordered appropriate for the patient? Yes Yes Yes Yes Yes - Yes   Is the patient currently in pain? - - No - - - -   Has the patient been assessed within the past 6 months for depression? - - - - - - -   Has the patient missed any days of school, work, or other routine activity?  "No No No No - - No       Last PHQ-2 Score on record:   PHQ-2 ( 1999 [employer]) 3/11/2020 3/5/2019   Q1: Little interest or pleasure in doing things 0 0   Q2: Feeling down, depressed or hopeless 0 0   PHQ-2 Score 0 0   Q1: Little interest or pleasure in doing things Not at all -   Q2: Feeling down, depressed or hopeless Not at all -   PHQ-2 Score 0 -       Vitals:  BP:   BP Readings from Last 1 Encounters:   12/08/20 (!) 168/78     Wt Readings from Last 1 Encounters:   12/08/20 126.1 kg (278 lb)     Estimated body surface area is 2.57 meters squared as calculated from the following:    Height as of 3/11/20: 1.88 m (6' 2\").    Weight as of 12/8/20: 126.1 kg (278 lb).    Labs:  No results found for NA within last 30 days.     No results found for K within last 30 days.     No results found for CA within last 30 days.     No results found for Mag within last 30 days.     No results found for Phos within last 30 days.     No results found for ALBUMIN within last 30 days.     No results found for BUN within last 30 days.     No results found for CR within last 30 days.     No results found for AST within last 30 days.     No results found for ALT within last 30 days.     No results found for BILITOTAL within last 30 days.     No results found for WBC within last 30 days.     No results found for HGB within last 30 days.     No results found for PLT within last 30 days.     No results found for ANC within last 30 days.         Assessment/Plan:  Nathan is tolerating abiraterone well. Continue current therapy.    Follow-Up:  2/8: labs  2/9: Dr. Davis appt    Refill Due:  Patient will call pharmacy back to set up refill after looking into his insurance benefits and copay    Nae Engel, PharmD, BCOP  Hematology/Oncology Clinical Pharmacist  [facility]  [facility]  [phone]      "

## 2021-01-27 ENCOUNTER — MYC MEDICAL ADVICE (OUTPATIENT)
Dept: INTERNAL MEDICINE | Facility: CLINIC | Age: 71
End: 2021-01-27

## 2021-02-17 ENCOUNTER — APPOINTMENT (OUTPATIENT)
Dept: LAB | Facility: CLINIC | Age: 71
End: 2021-02-17
Attending: INTERNAL MEDICINE
Payer: COMMERCIAL

## 2021-02-17 ENCOUNTER — ONCOLOGY VISIT (OUTPATIENT)
Dept: ONCOLOGY | Facility: CLINIC | Age: 71
End: 2021-02-17
Attending: INTERNAL MEDICINE
Payer: COMMERCIAL

## 2021-02-17 VITALS
TEMPERATURE: 97.7 F | OXYGEN SATURATION: 97 % | RESPIRATION RATE: 18 BRPM | WEIGHT: 285 LBS | HEART RATE: 57 BPM | SYSTOLIC BLOOD PRESSURE: 102 MMHG | BODY MASS INDEX: 36.59 KG/M2 | DIASTOLIC BLOOD PRESSURE: 72 MMHG

## 2021-02-17 DIAGNOSIS — E66.01 MORBID OBESITY (H): ICD-10-CM

## 2021-02-17 DIAGNOSIS — C61 MALIGNANT NEOPLASM OF PROSTATE (H): ICD-10-CM

## 2021-02-17 LAB
ALBUMIN SERPL-MCNC: 3.4 G/DL (ref 3.4–5)
ALP SERPL-CCNC: 75 U/L (ref 40–150)
ALT SERPL W P-5'-P-CCNC: 20 U/L (ref 0–70)
ANION GAP SERPL CALCULATED.3IONS-SCNC: 4 MMOL/L (ref 3–14)
AST SERPL W P-5'-P-CCNC: 17 U/L (ref 0–45)
BASOPHILS # BLD AUTO: 0 10E9/L (ref 0–0.2)
BASOPHILS NFR BLD AUTO: 0.6 %
BILIRUB SERPL-MCNC: 0.3 MG/DL (ref 0.2–1.3)
BUN SERPL-MCNC: 23 MG/DL (ref 7–30)
CALCIUM SERPL-MCNC: 8.8 MG/DL (ref 8.5–10.1)
CHLORIDE SERPL-SCNC: 110 MMOL/L (ref 94–109)
CHOLEST SERPL-MCNC: 182 MG/DL
CO2 SERPL-SCNC: 29 MMOL/L (ref 20–32)
CREAT SERPL-MCNC: 0.91 MG/DL (ref 0.66–1.25)
DIFFERENTIAL METHOD BLD: ABNORMAL
EOSINOPHIL # BLD AUTO: 0.2 10E9/L (ref 0–0.7)
EOSINOPHIL NFR BLD AUTO: 4.6 %
ERYTHROCYTE [DISTWIDTH] IN BLOOD BY AUTOMATED COUNT: 13.4 % (ref 10–15)
GFR SERPL CREATININE-BSD FRML MDRD: 86 ML/MIN/{1.73_M2}
GLUCOSE SERPL-MCNC: 97 MG/DL (ref 70–99)
HCT VFR BLD AUTO: 37.9 % (ref 40–53)
HDLC SERPL-MCNC: 55 MG/DL
HGB BLD-MCNC: 12.8 G/DL (ref 13.3–17.7)
IMM GRANULOCYTES # BLD: 0 10E9/L (ref 0–0.4)
IMM GRANULOCYTES NFR BLD: 0.2 %
LDLC SERPL CALC-MCNC: 117 MG/DL
LYMPHOCYTES # BLD AUTO: 1.3 10E9/L (ref 0.8–5.3)
LYMPHOCYTES NFR BLD AUTO: 26.4 %
MCH RBC QN AUTO: 30.2 PG (ref 26.5–33)
MCHC RBC AUTO-ENTMCNC: 33.8 G/DL (ref 31.5–36.5)
MCV RBC AUTO: 89 FL (ref 78–100)
MONOCYTES # BLD AUTO: 0.4 10E9/L (ref 0–1.3)
MONOCYTES NFR BLD AUTO: 7.8 %
NEUTROPHILS # BLD AUTO: 3 10E9/L (ref 1.6–8.3)
NEUTROPHILS NFR BLD AUTO: 60.4 %
NONHDLC SERPL-MCNC: 127 MG/DL
NRBC # BLD AUTO: 0 10*3/UL
NRBC BLD AUTO-RTO: 0 /100
PLATELET # BLD AUTO: 142 10E9/L (ref 150–450)
POTASSIUM SERPL-SCNC: 4.4 MMOL/L (ref 3.4–5.3)
PROT SERPL-MCNC: 6.7 G/DL (ref 6.8–8.8)
PSA SERPL-MCNC: <0.01 UG/L (ref 0–4)
RBC # BLD AUTO: 4.24 10E12/L (ref 4.4–5.9)
SODIUM SERPL-SCNC: 142 MMOL/L (ref 133–144)
TRIGL SERPL-MCNC: 48 MG/DL
WBC # BLD AUTO: 5 10E9/L (ref 4–11)

## 2021-02-17 PROCEDURE — 80053 COMPREHEN METABOLIC PANEL: CPT | Performed by: INTERNAL MEDICINE

## 2021-02-17 PROCEDURE — 80061 LIPID PANEL: CPT | Performed by: INTERNAL MEDICINE

## 2021-02-17 PROCEDURE — 84153 ASSAY OF PSA TOTAL: CPT | Performed by: INTERNAL MEDICINE

## 2021-02-17 PROCEDURE — 36415 COLL VENOUS BLD VENIPUNCTURE: CPT

## 2021-02-17 PROCEDURE — 99214 OFFICE O/P EST MOD 30 MIN: CPT | Performed by: INTERNAL MEDICINE

## 2021-02-17 PROCEDURE — G0463 HOSPITAL OUTPT CLINIC VISIT: HCPCS

## 2021-02-17 PROCEDURE — 84403 ASSAY OF TOTAL TESTOSTERONE: CPT | Performed by: INTERNAL MEDICINE

## 2021-02-17 PROCEDURE — 85025 COMPLETE CBC W/AUTO DIFF WBC: CPT | Performed by: INTERNAL MEDICINE

## 2021-02-17 PROCEDURE — 86316 IMMUNOASSAY TUMOR OTHER: CPT | Performed by: INTERNAL MEDICINE

## 2021-02-17 ASSESSMENT — PAIN SCALES - GENERAL: PAINLEVEL: NO PAIN (0)

## 2021-02-17 NOTE — NURSING NOTE
"Oncology Rooming Note    February 17, 2021 7:31 AM   Keenan Sprague is a 70 year old male who presents for:    Chief Complaint   Patient presents with     Lab Only     venipuncture, vitals checked     Oncology Clinic Visit     MALIGNANT NEOPLASM OF PROSTATE     Initial Vitals: /72   Pulse 57   Temp 97.7  F (36.5  C)   Resp 18   Wt 129.3 kg (285 lb)   SpO2 97%   BMI 36.59 kg/m   Estimated body mass index is 36.59 kg/m  as calculated from the following:    Height as of 3/11/20: 1.88 m (6' 2\").    Weight as of this encounter: 129.3 kg (285 lb). Body surface area is 2.6 meters squared.  No Pain (0) Comment: Data Unavailable   No LMP for male patient.  Allergies reviewed: Yes  Medications reviewed: Yes    Medications: Medication refills not needed today.  Pharmacy name entered into [facility] / [facility]:    [facility] [place], [place] - [address]  [facility]  [facility] - [place], [place] - [address]  [id number] - [place], [place] - [address]  [facility] - [place], [place] - [address]    Clinical concerns: No new concerns today.       Jesskia Gautam MA            "

## 2021-02-17 NOTE — NURSING NOTE
Chief Complaint   Patient presents with     Lab Only     venipuncture, vitals checked     Lizbeth Ryder RN on 2/17/2021 at 7:28 AM
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
## 2021-02-19 LAB — CGA SERPL-MCNC: 67 NG/ML (ref 0–103)

## 2021-02-22 LAB — TESTOST SERPL-MCNC: <2 NG/DL (ref 240–950)

## 2021-03-25 DIAGNOSIS — E66.01 MORBID OBESITY (H): ICD-10-CM

## 2021-03-25 DIAGNOSIS — C61 MALIGNANT NEOPLASM OF PROSTATE (H): ICD-10-CM

## 2021-03-25 LAB
ALBUMIN SERPL-MCNC: 3.6 G/DL (ref 3.4–5)
ALP SERPL-CCNC: 77 U/L (ref 40–150)
ALT SERPL W P-5'-P-CCNC: 22 U/L (ref 0–70)
ANION GAP SERPL CALCULATED.3IONS-SCNC: 3 MMOL/L (ref 3–14)
AST SERPL W P-5'-P-CCNC: 15 U/L (ref 0–45)
BASOPHILS # BLD AUTO: 0 10E9/L (ref 0–0.2)
BASOPHILS NFR BLD AUTO: 0.6 %
BILIRUB SERPL-MCNC: 0.5 MG/DL (ref 0.2–1.3)
BUN SERPL-MCNC: 16 MG/DL (ref 7–30)
CALCIUM SERPL-MCNC: 9 MG/DL (ref 8.5–10.1)
CHLORIDE SERPL-SCNC: 109 MMOL/L (ref 94–109)
CHOLEST SERPL-MCNC: 200 MG/DL
CO2 SERPL-SCNC: 29 MMOL/L (ref 20–32)
CREAT SERPL-MCNC: 0.94 MG/DL (ref 0.66–1.25)
DIFFERENTIAL METHOD BLD: ABNORMAL
EOSINOPHIL # BLD AUTO: 0.2 10E9/L (ref 0–0.7)
EOSINOPHIL NFR BLD AUTO: 3.2 %
ERYTHROCYTE [DISTWIDTH] IN BLOOD BY AUTOMATED COUNT: 13.4 % (ref 10–15)
GFR SERPL CREATININE-BSD FRML MDRD: 82 ML/MIN/{1.73_M2}
GLUCOSE SERPL-MCNC: 99 MG/DL (ref 70–99)
HCT VFR BLD AUTO: 39.7 % (ref 40–53)
HDLC SERPL-MCNC: 64 MG/DL
HGB BLD-MCNC: 13.2 G/DL (ref 13.3–17.7)
IMM GRANULOCYTES # BLD: 0 10E9/L (ref 0–0.4)
IMM GRANULOCYTES NFR BLD: 0.1 %
LDLC SERPL CALC-MCNC: 124 MG/DL
LYMPHOCYTES # BLD AUTO: 1.7 10E9/L (ref 0.8–5.3)
LYMPHOCYTES NFR BLD AUTO: 25.1 %
MCH RBC QN AUTO: 30.5 PG (ref 26.5–33)
MCHC RBC AUTO-ENTMCNC: 33.2 G/DL (ref 31.5–36.5)
MCV RBC AUTO: 92 FL (ref 78–100)
MONOCYTES # BLD AUTO: 0.4 10E9/L (ref 0–1.3)
MONOCYTES NFR BLD AUTO: 6.3 %
NEUTROPHILS # BLD AUTO: 4.4 10E9/L (ref 1.6–8.3)
NEUTROPHILS NFR BLD AUTO: 64.7 %
NONHDLC SERPL-MCNC: 136 MG/DL
NRBC # BLD AUTO: 0 10*3/UL
NRBC BLD AUTO-RTO: 0 /100
PLATELET # BLD AUTO: 155 10E9/L (ref 150–450)
POTASSIUM SERPL-SCNC: 3.9 MMOL/L (ref 3.4–5.3)
PROT SERPL-MCNC: 7 G/DL (ref 6.8–8.8)
PSA SERPL-MCNC: <0.01 UG/L (ref 0–4)
RBC # BLD AUTO: 4.33 10E12/L (ref 4.4–5.9)
SODIUM SERPL-SCNC: 141 MMOL/L (ref 133–144)
TRIGL SERPL-MCNC: 58 MG/DL
WBC # BLD AUTO: 6.9 10E9/L (ref 4–11)

## 2021-03-25 PROCEDURE — 84153 ASSAY OF PSA TOTAL: CPT | Performed by: INTERNAL MEDICINE

## 2021-03-25 PROCEDURE — 80061 LIPID PANEL: CPT | Performed by: INTERNAL MEDICINE

## 2021-03-25 PROCEDURE — 86316 IMMUNOASSAY TUMOR OTHER: CPT | Performed by: INTERNAL MEDICINE

## 2021-03-25 PROCEDURE — 80053 COMPREHEN METABOLIC PANEL: CPT | Performed by: INTERNAL MEDICINE

## 2021-03-25 PROCEDURE — 84403 ASSAY OF TOTAL TESTOSTERONE: CPT | Performed by: INTERNAL MEDICINE

## 2021-03-25 PROCEDURE — 85025 COMPLETE CBC W/AUTO DIFF WBC: CPT | Performed by: INTERNAL MEDICINE

## 2021-03-25 NOTE — NURSING NOTE
Chief Complaint   Patient presents with     Blood Draw     labs drawn by venipuncture by rn in lab.  lab only appointment.      Haily Rivera RN

## 2021-03-27 LAB
CGA SERPL-MCNC: 74 NG/ML (ref 0–103)
TESTOST SERPL-MCNC: <2 NG/DL (ref 240–950)

## 2021-03-29 ENCOUNTER — ONCOLOGY VISIT (OUTPATIENT)
Dept: ONCOLOGY | Facility: CLINIC | Age: 71
End: 2021-03-29
Attending: INTERNAL MEDICINE
Payer: COMMERCIAL

## 2021-03-29 VITALS
SYSTOLIC BLOOD PRESSURE: 165 MMHG | OXYGEN SATURATION: 99 % | HEIGHT: 74 IN | WEIGHT: 286 LBS | TEMPERATURE: 97.9 F | HEART RATE: 82 BPM | RESPIRATION RATE: 16 BRPM | DIASTOLIC BLOOD PRESSURE: 75 MMHG | BODY MASS INDEX: 36.7 KG/M2

## 2021-03-29 DIAGNOSIS — C61 PROSTATE CANCER (H): Primary | ICD-10-CM

## 2021-03-29 PROCEDURE — G0463 HOSPITAL OUTPT CLINIC VISIT: HCPCS

## 2021-03-29 PROCEDURE — 99214 OFFICE O/P EST MOD 30 MIN: CPT | Mod: GC | Performed by: INTERNAL MEDICINE

## 2021-03-29 ASSESSMENT — MIFFLIN-ST. JEOR: SCORE: 2137.04

## 2021-03-29 ASSESSMENT — PAIN SCALES - GENERAL: PAINLEVEL: NO PAIN (0)
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
## 2021-03-29 NOTE — NURSING NOTE
"Oncology Rooming Note    March 29, 2021 2:53 PM   Keenan Sprague is a 70 year old male who presents for:    Chief Complaint   Patient presents with     Oncology Clinic Visit     [facility] RETURN - PROSTATE CANCER     Initial Vitals: BP (!) 197/90 (BP Location: Right arm, Patient Position: Chair, Cuff Size: Adult Large)   Pulse 82   Temp 97.9  F (36.6  C)   Resp 16   Ht 1.88 m (6' 2\")   Wt 129.7 kg (286 lb)   SpO2 99%   BMI 36.72 kg/m   Estimated body mass index is 36.72 kg/m  as calculated from the following:    Height as of this encounter: 1.88 m (6' 2\").    Weight as of this encounter: 129.7 kg (286 lb). Body surface area is 2.6 meters squared.  No Pain (0) Comment: Data Unavailable   No LMP for male patient.  Allergies reviewed: Yes  Medications reviewed: Yes    Medications: Medication refills not needed today.  Pharmacy name entered into [employer]:    [facility] - [place], [place] - [address]  [facility]  [facility] - [place], [place] - [address]  [id number] - [place], [place] - [address]  [facility] - [place], [place] - [id number] [address]    Clinical concerns: Blood pressure 197 90 - patient talking about covid mask situation upset. Dr. Prieto was notified.      Jose Alfredo Valencia LPN            "

## 2021-03-29 NOTE — PATIENT INSTRUCTIONS
1. Discontinue Zytiga  2. Prednisone 2.5 mg daily x 3 and stop  3. Followup prior to next planned Lupron in June - if PSA Is up continue lupron and if psa remains at zero will stop it.   4. After stop will check T and PSA in 3-4 months.   5. We may restart when PSA rises to 12-14 ( 50% of baseline)  6. No scans at this time.
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
## 2021-04-03 ENCOUNTER — TELEPHONE (OUTPATIENT)
Dept: ONCOLOGY | Facility: CLINIC | Age: 71
End: 2021-04-03

## 2021-04-03 NOTE — ORAL ONC MGMT
[facility] Oral Chemotherapy Monitoring Program    Thank you for the opportunity to be a part in the care of this patient's oral chemotherapy. The oncology pharmacy will no longer be following this patient for oral chemotherapy. If there are any questions or the plan changes, feel free to contact us.    ORAL CHEMOTHERAPY 2/24/2020 3/25/2020 7/17/2020 10/12/2020 12/29/2020 1/22/2021 4/3/2021   Assessment Type - - - - Refill Quarterly Follow up Discontinuation   Stop Date - - - - - - 3/29/2021   Reason for Discontinuation - - - - - - Therapy complete   Diagnosis Code - - - - Prostate Cancer Prostate Cancer Prostate Cancer   Providers - - - - Dr. Ryan Davis   [facility]   Drug Name Zytiga (Abiraterone) Zytiga (Abiraterone) Zytiga (Abiraterone) Zytiga (Abiraterone) Zytiga (Abiraterone) Zytiga (Abiraterone) Zytiga (abiraterone)   Dose - - - - 1,000 mg 1,000 mg 1,000 mg   Current Schedule Daily Daily Daily Daily Daily Daily Daily   Cycle Details Continuous Continuous Continuous Continuous Continuous Continuous Continuous   Start Date of Last Cycle 2/3/2020 - 7/1/2020 - - - -   Planned next cycle start date 3/4/2020 3/30/2020 7/31/2020 - - - -   Doses missed in last 2 weeks 0 0 0 0 - 0 -   Adherence Assessment Adherent Adherent Adherent Adherent - Adherent -   Adverse Effects No AE identified during assessment Other (see note for details) No AE identified during assessment No AE identified during assessment - No AE identified during assessment -   Other (See Note for Details) - Hotflashes - - - - -   Pharmacist intervention(other) - Yes - - - - -   Any new drug interactions? No No No No - No -   Is the dose as ordered appropriate for the patient? Yes Yes Yes Yes - Yes -   Is the patient currently in pain? - No - - - - -   Has the patient been assessed within the past 6 months for depression? - - - - - - -   Has the patient missed any days  of school, work, or other routine activity? No No No - - No -       Marcela Ellsworth  Pharmacy Intern  [facility]  [phone]

## 2021-05-18 ENCOUNTER — PATIENT OUTREACH (OUTPATIENT)
Dept: ONCOLOGY | Facility: CLINIC | Age: 71
End: 2021-05-18

## 2021-05-19 NOTE — PROGRESS NOTES
TC to pt returning his VM with regarding upcoming appts. Made plan with pt to change lab appt to 05/26/2021 @ [facility] and to have a visit with Dr. Prieto in person on 05/28/2021. Pt stated understanding of all and agreed to call clinic with any questions or concerns. Message sent to scheduling pool to make changes and notify pt.

## 2021-05-26 ENCOUNTER — HOSPITAL ENCOUNTER (OUTPATIENT)
Dept: LAB | Facility: CLINIC | Age: 71
Discharge: HOME | End: 2021-05-26
Admitting: INTERNAL MEDICINE
Payer: COMMERCIAL

## 2021-05-26 DIAGNOSIS — C61 MALIGNANT NEOPLASM OF PROSTATE (H): ICD-10-CM

## 2021-05-26 LAB
ALBUMIN SERPL-MCNC: 3.7 G/DL (ref 3.4–5)
ALP SERPL-CCNC: 87 U/L (ref 40–150)
ALT SERPL W P-5'-P-CCNC: 22 U/L (ref 0–70)
ANION GAP SERPL CALCULATED.3IONS-SCNC: 5 MMOL/L (ref 3–14)
AST SERPL W P-5'-P-CCNC: 16 U/L (ref 0–45)
BASOPHILS # BLD AUTO: 0 10E9/L (ref 0–0.2)
BASOPHILS NFR BLD AUTO: 0.6 %
BILIRUB SERPL-MCNC: 0.6 MG/DL (ref 0.2–1.3)
BUN SERPL-MCNC: 22 MG/DL (ref 7–30)
CALCIUM SERPL-MCNC: 9 MG/DL (ref 8.5–10.1)
CHLORIDE SERPL-SCNC: 107 MMOL/L (ref 94–109)
CO2 SERPL-SCNC: 25 MMOL/L (ref 20–32)
CREAT SERPL-MCNC: 1.03 MG/DL (ref 0.66–1.25)
DIFFERENTIAL METHOD BLD: ABNORMAL
EOSINOPHIL # BLD AUTO: 0.2 10E9/L (ref 0–0.7)
EOSINOPHIL NFR BLD AUTO: 3.6 %
ERYTHROCYTE [DISTWIDTH] IN BLOOD BY AUTOMATED COUNT: 13.9 % (ref 10–15)
GFR SERPL CREATININE-BSD FRML MDRD: 74 ML/MIN/{1.73_M2}
GLUCOSE SERPL-MCNC: 102 MG/DL (ref 70–99)
HCT VFR BLD AUTO: 39.7 % (ref 40–53)
HGB BLD-MCNC: 13.1 G/DL (ref 13.3–17.7)
IMM GRANULOCYTES # BLD: 0 10E9/L (ref 0–0.4)
IMM GRANULOCYTES NFR BLD: 0.4 %
LYMPHOCYTES # BLD AUTO: 1.6 10E9/L (ref 0.8–5.3)
LYMPHOCYTES NFR BLD AUTO: 31.5 %
MCH RBC QN AUTO: 30.8 PG (ref 26.5–33)
MCHC RBC AUTO-ENTMCNC: 33 G/DL (ref 31.5–36.5)
MCV RBC AUTO: 93 FL (ref 78–100)
MONOCYTES # BLD AUTO: 0.4 10E9/L (ref 0–1.3)
MONOCYTES NFR BLD AUTO: 7.8 %
NEUTROPHILS # BLD AUTO: 2.8 10E9/L (ref 1.6–8.3)
NEUTROPHILS NFR BLD AUTO: 56.1 %
NRBC # BLD AUTO: 0 10*3/UL
NRBC BLD AUTO-RTO: 0 /100
PLATELET # BLD AUTO: 176 10E9/L (ref 150–450)
POTASSIUM SERPL-SCNC: 4.2 MMOL/L (ref 3.4–5.3)
PROT SERPL-MCNC: 7.5 G/DL (ref 6.8–8.8)
PSA SERPL-MCNC: <0.01 UG/L (ref 0–4)
RBC # BLD AUTO: 4.26 10E12/L (ref 4.4–5.9)
SODIUM SERPL-SCNC: 137 MMOL/L (ref 133–144)
WBC # BLD AUTO: 5 10E9/L (ref 4–11)

## 2021-05-26 PROCEDURE — 80053 COMPREHEN METABOLIC PANEL: CPT | Performed by: INTERNAL MEDICINE

## 2021-05-26 PROCEDURE — 86316 IMMUNOASSAY TUMOR OTHER: CPT | Performed by: INTERNAL MEDICINE

## 2021-05-26 PROCEDURE — 84403 ASSAY OF TOTAL TESTOSTERONE: CPT | Performed by: INTERNAL MEDICINE

## 2021-05-26 PROCEDURE — 84153 ASSAY OF PSA TOTAL: CPT | Performed by: INTERNAL MEDICINE

## 2021-05-26 PROCEDURE — 85025 COMPLETE CBC W/AUTO DIFF WBC: CPT | Performed by: INTERNAL MEDICINE

## 2021-05-26 PROCEDURE — 36415 COLL VENOUS BLD VENIPUNCTURE: CPT | Performed by: INTERNAL MEDICINE

## 2021-05-27 LAB — TESTOST SERPL-MCNC: 4 NG/DL (ref 240–950)

## 2021-05-28 ENCOUNTER — ONCOLOGY VISIT (OUTPATIENT)
Dept: ONCOLOGY | Facility: CLINIC | Age: 71
End: 2021-05-28
Attending: INTERNAL MEDICINE
Payer: COMMERCIAL

## 2021-05-28 VITALS
WEIGHT: 274.6 LBS | DIASTOLIC BLOOD PRESSURE: 70 MMHG | RESPIRATION RATE: 18 BRPM | OXYGEN SATURATION: 100 % | TEMPERATURE: 99.2 F | SYSTOLIC BLOOD PRESSURE: 134 MMHG | BODY MASS INDEX: 35.26 KG/M2 | HEART RATE: 50 BPM

## 2021-05-28 DIAGNOSIS — C61 PROSTATE CANCER (H): Primary | ICD-10-CM

## 2021-05-28 LAB — CGA SERPL-MCNC: 61 NG/ML (ref 0–103)

## 2021-05-28 PROCEDURE — 99214 OFFICE O/P EST MOD 30 MIN: CPT | Performed by: INTERNAL MEDICINE

## 2021-05-28 PROCEDURE — 999N001193 HC VIDEO/TELEPHONE VISIT; NO CHARGE

## 2021-05-28 ASSESSMENT — PAIN SCALES - GENERAL: PAINLEVEL: NO PAIN (0)
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
## 2021-05-28 NOTE — NURSING NOTE
"Oncology Rooming Note    May 28, 2021 1:43 PM   Keenan Sprague is a 70 year old male who presents for:    Chief Complaint   Patient presents with     Oncology Clinic Visit     Pt is here for a rtn for Malignant Prostate.      Initial Vitals: Blood Pressure 134/70   Pulse 50   Temperature 99.2  F (37.3  C) (Tympanic)   Respiration 18   Weight 124.6 kg (274 lb 9.6 oz)   Oxygen Saturation 100%   Body Mass Index 35.26 kg/m   Estimated body mass index is 35.26 kg/m  as calculated from the following:    Height as of 3/29/21: 1.88 m (6' 2\").    Weight as of this encounter: 124.6 kg (274 lb 9.6 oz). Body surface area is 2.55 meters squared.  No Pain (0) Comment: Data Unavailable   No LMP for male patient.  Allergies reviewed: Yes  Medications reviewed: Yes    Medications: Medication refills not needed today.  Pharmacy name entered into [employer]:    [facility] - [place], [place] - [address]  [facility]  [facility] - [place], [place] - [address]  [id number] - [place], [place] - [id number]  [facility] - [place], [place] - [address] [facility]    Clinical concerns: none       Shabnam Cunningham MA            "
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
## 2021-05-28 NOTE — PATIENT INSTRUCTIONS
1. Stop Lupron  2. Expect rise in T  3. Follow PSA  4.  If/when a substantial rise in PSA occurs in particular in the context of a rising testosterone we will reimage with next generation imaging and consider whether metastasis directed therapy such as radiation or return to hormonal therapy is most appropriate at that time.  5. Eventually we will consider doing next generation sequencing to determine if additional therapy is needed should castration resistant disease develop.  6.  I endorsed continued light and duty at work due to the fatigue and loss of muscle mass that occur through androgen deprivation therapy in a patient with a very physical job.  We will be happy to continue that paperwork for at least the next 2 to 3 months.  With the eventual return of testosterone the situation may change.

## 2021-05-30 ENCOUNTER — HEALTH MAINTENANCE LETTER (OUTPATIENT)
Age: 71
End: 2021-05-30

## 2021-06-02 ENCOUNTER — PATIENT OUTREACH (OUTPATIENT)
Dept: ONCOLOGY | Facility: CLINIC | Age: 71
End: 2021-06-02

## 2021-06-02 NOTE — PROGRESS NOTES
TC to pt returning his call regarding getting [place] paperwork completed and signed by Dr. Prieto. Pt stated he already spoke with a [facility] staff member who provided him with the [employer] fax # and that he will be sending the paperwork either today or tomorrow and will call with any other questions or concerns.

## 2021-06-03 ENCOUNTER — DOCUMENTATION ONLY (OUTPATIENT)
Dept: ONCOLOGY | Facility: CLINIC | Age: 71
End: 2021-06-03

## 2021-06-03 NOTE — PROGRESS NOTES
FMLA forms received via fax from pt.      Forms to be completed and put in folder for provider to approve.    What is being requested: Continued Intermittent leave    Fax #:  [phone]      Sury Bates CMA ([facility])

## 2021-06-08 NOTE — PROGRESS NOTES
FMLA forms filled out and put in providers folder for review and signature.      Sury Bates CMA ([facility])

## 2021-06-14 NOTE — PROGRESS NOTES
[facility] paperwork completed, checked for accuracy, signed and faxed to [facility]  @ [phone]. A copy was made, sent to scanning and original mailed to patient at home address.    Successful transmission verified in Right Fax.      Shabnam Cunningham MA

## 2021-07-29 ENCOUNTER — LAB (OUTPATIENT)
Dept: LAB | Facility: CLINIC | Age: 71
End: 2021-07-29
Payer: COMMERCIAL

## 2021-07-29 ENCOUNTER — TELEPHONE (OUTPATIENT)
Dept: ONCOLOGY | Facility: CLINIC | Age: 71
End: 2021-07-29

## 2021-07-29 ENCOUNTER — PATIENT OUTREACH (OUTPATIENT)
Dept: ONCOLOGY | Facility: CLINIC | Age: 71
End: 2021-07-29

## 2021-07-29 DIAGNOSIS — C61 MALIGNANT NEOPLASM OF PROSTATE (H): ICD-10-CM

## 2021-07-29 DIAGNOSIS — C61 MALIGNANT NEOPLASM OF PROSTATE (H): Primary | ICD-10-CM

## 2021-07-29 LAB
ALBUMIN SERPL-MCNC: 4.1 G/DL (ref 3.4–5)
ALP SERPL-CCNC: 88 U/L (ref 40–150)
ALT SERPL W P-5'-P-CCNC: 28 U/L (ref 0–70)
ANION GAP SERPL CALCULATED.3IONS-SCNC: 6 MMOL/L (ref 3–14)
AST SERPL W P-5'-P-CCNC: 24 U/L (ref 0–45)
BASOPHILS # BLD AUTO: 0 10E3/UL (ref 0–0.2)
BASOPHILS NFR BLD AUTO: 1 %
BILIRUB SERPL-MCNC: 0.5 MG/DL (ref 0.2–1.3)
BUN SERPL-MCNC: 21 MG/DL (ref 7–30)
CALCIUM SERPL-MCNC: 9.2 MG/DL (ref 8.5–10.1)
CHLORIDE BLD-SCNC: 104 MMOL/L (ref 94–109)
CO2 SERPL-SCNC: 27 MMOL/L (ref 20–32)
CREAT SERPL-MCNC: 0.97 MG/DL (ref 0.66–1.25)
EOSINOPHIL # BLD AUTO: 0.2 10E3/UL (ref 0–0.7)
EOSINOPHIL NFR BLD AUTO: 3 %
ERYTHROCYTE [DISTWIDTH] IN BLOOD BY AUTOMATED COUNT: 13.6 % (ref 10–15)
GFR SERPL CREATININE-BSD FRML MDRD: 80 ML/MIN/1.73M2
GLUCOSE BLD-MCNC: 89 MG/DL (ref 70–99)
HCT VFR BLD AUTO: 39.5 % (ref 40–53)
HGB BLD-MCNC: 13 G/DL (ref 13.3–17.7)
IMM GRANULOCYTES # BLD: 0 10E3/UL
IMM GRANULOCYTES NFR BLD: 0 %
LYMPHOCYTES # BLD AUTO: 1.9 10E3/UL (ref 0.8–5.3)
LYMPHOCYTES NFR BLD AUTO: 28 %
MCH RBC QN AUTO: 30.7 PG (ref 26.5–33)
MCHC RBC AUTO-ENTMCNC: 32.9 G/DL (ref 31.5–36.5)
MCV RBC AUTO: 93 FL (ref 78–100)
MONOCYTES # BLD AUTO: 0.4 10E3/UL (ref 0–1.3)
MONOCYTES NFR BLD AUTO: 6 %
NEUTROPHILS # BLD AUTO: 4.2 10E3/UL (ref 1.6–8.3)
NEUTROPHILS NFR BLD AUTO: 62 %
NRBC # BLD AUTO: 0 10E3/UL
NRBC BLD AUTO-RTO: 0 /100
PLATELET # BLD AUTO: 173 10E3/UL (ref 150–450)
POTASSIUM BLD-SCNC: 4 MMOL/L (ref 3.4–5.3)
PROT SERPL-MCNC: 7.9 G/DL (ref 6.8–8.8)
RBC # BLD AUTO: 4.23 10E6/UL (ref 4.4–5.9)
SODIUM SERPL-SCNC: 137 MMOL/L (ref 133–144)
WBC # BLD AUTO: 6.8 10E3/UL (ref 4–11)

## 2021-07-29 PROCEDURE — 85048 AUTOMATED LEUKOCYTE COUNT: CPT

## 2021-07-29 PROCEDURE — 80053 COMPREHEN METABOLIC PANEL: CPT

## 2021-07-29 PROCEDURE — 84403 ASSAY OF TOTAL TESTOSTERONE: CPT

## 2021-07-29 PROCEDURE — 36415 COLL VENOUS BLD VENIPUNCTURE: CPT

## 2021-07-29 PROCEDURE — 84153 ASSAY OF PSA TOTAL: CPT

## 2021-07-29 NOTE — TELEPHONE ENCOUNTER
Calls in today needing lab orders faxed to [facility] lab.     Fax [phone]     Can someone please call him when we get something faxed over.     Returned call to john to let him know that Orders will be entered into his chart.

## 2021-07-30 LAB — PSA SERPL-MCNC: <0.01 UG/L (ref 0–4)

## 2021-08-02 ENCOUNTER — VIRTUAL VISIT (OUTPATIENT)
Dept: ONCOLOGY | Facility: CLINIC | Age: 71
End: 2021-08-02
Attending: INTERNAL MEDICINE
Payer: COMMERCIAL

## 2021-08-02 DIAGNOSIS — C61 PROSTATE CANCER (H): Primary | ICD-10-CM

## 2021-08-02 LAB — TESTOST SERPL-MCNC: 6 NG/DL (ref 240–950)

## 2021-08-02 PROCEDURE — 99213 OFFICE O/P EST LOW 20 MIN: CPT | Mod: 95 | Performed by: INTERNAL MEDICINE

## 2021-08-02 NOTE — PROGRESS NOTES
"Nathan is a 70 year old who is being evaluated via a billable telephone visit.      What phone number would you like to be contacted at? [phone]  How would you like to obtain your AVS? MyChart     Vitals - Patient Reported  Weight (Patient Reported): 124.3 kg (274 lb)  Height (Patient Reported): 188 cm (6' 2\")  BMI (Based on Pt Reported Ht/Wt): 35.18  Pain Score: No Pain (0)    Lisa SY    Phone call duration: 15 minutes on phone, reviewing history and data  "
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
## 2021-08-26 ENCOUNTER — OFFICE VISIT (OUTPATIENT)
Dept: VASCULAR SURGERY | Facility: CLINIC | Age: 71
End: 2021-08-26
Payer: COMMERCIAL

## 2021-08-26 DIAGNOSIS — I83.892 VARICOSE VEINS OF LEG WITH SWELLING, LEFT: Primary | ICD-10-CM

## 2021-08-26 PROCEDURE — 99202 OFFICE O/P NEW SF 15 MIN: CPT | Performed by: SURGERY

## 2021-08-26 NOTE — PROGRESS NOTES
I had the pleasure of seeing Mr. Keenan Sprague in the vein clinic today.  He is a very pleasant 70-year-old gentleman who comes to us for further evaluation of left lower extremity varicose veins.  He says that he does not have any pain or swelling in his left lower extremity but along the lower lateral half of his leg.  He touched it and it did not feel warm, it felt soft and was nontender.    He just wants to have this evaluated.  While talking to him he elaborated that he has also noticed some increasing discoloration along his left ankle.    On my examination he has a cluster of subcutaneous varicose veins along the anterior lateral compartment of his left leg.  He also has hyperpigmentation in the gaiter area in the medial malleolus consistent with chronic venous insufficiency.    Diagnosis: Asymptomatic left lower extremity chronic venous insufficiency without pain or ulceration.    I explained the pathophysiology of disease to him in detail.  As such no surgical treatment is advised.  We have given him a prescription of compression stocking to minimize the progression of disease and symptoms if they arise.  He can follow-up on a as needed basis.  All of this was explained to him in detail.  Was explained to him in detail.

## 2021-08-26 NOTE — LETTER
8/26/2021         RE: Keenan Sprague  [address]  [place] [facility] [facility] [address]        Dear Colleague,    Thank you for referring your patient, Keenan Sprague, to the [facility]. Please see a copy of my visit note below.    I had the pleasure of seeing Mr. Keenan Sprague in the vein clinic today.  He is a very pleasant 70-year-old gentleman who comes to us for further evaluation of left lower extremity varicose veins.  He says that he does not have any pain or swelling in his left lower extremity but along the lower lateral half of his leg.  He touched it and it did not feel warm, it felt soft and was nontender.    He just wants to have this evaluated.  While talking to him he elaborated that he has also noticed some increasing discoloration along his left ankle.    On my examination he has a cluster of subcutaneous varicose veins along the anterior lateral compartment of his left leg.  He also has hyperpigmentation in the gaiter area in the medial malleolus consistent with chronic venous insufficiency.    Diagnosis: Asymptomatic left lower extremity chronic venous insufficiency without pain or ulceration.    I explained the pathophysiology of disease to him in detail.  As such no surgical treatment is advised.  We have given him a prescription of compression stocking to minimize the progression of disease and symptoms if they arise.  He can follow-up on a as needed basis.  All of this was explained to him in detail.  Was explained to him in detail.      Again, thank you for allowing me to participate in the care of your patient.        Sincerely,        Tong Morrison MD

## 2021-09-08 ENCOUNTER — PATIENT OUTREACH (OUTPATIENT)
Dept: ONCOLOGY | Facility: CLINIC | Age: 71
End: 2021-09-08

## 2021-09-08 NOTE — PROGRESS NOTES
TC to pt returning his call regarding appt with Mary Grace on 10/26 and needing labs prior. Told pt that lab orders are in for a CBC, CMP, PSA, and testosterone. Pt stated this is what he needed to confirm as he will walk-in at [facility] prior to his appt with Mary Grace. My Chart message sent to pt as well with this information. Pt stated understanding of all and agreed to call clinic with any questions or concerns.

## 2021-09-19 ENCOUNTER — HEALTH MAINTENANCE LETTER (OUTPATIENT)
Age: 71
End: 2021-09-19

## 2021-10-07 ENCOUNTER — OFFICE VISIT (OUTPATIENT)
Dept: SURGERY | Facility: CLINIC | Age: 71
End: 2021-10-07
Payer: COMMERCIAL

## 2021-10-07 ENCOUNTER — PREP FOR PROCEDURE (OUTPATIENT)
Dept: SURGERY | Facility: CLINIC | Age: 71
End: 2021-10-07

## 2021-10-07 ENCOUNTER — TELEPHONE (OUTPATIENT)
Dept: SURGERY | Facility: CLINIC | Age: 71
End: 2021-10-07

## 2021-10-07 VITALS
HEIGHT: 74 IN | HEART RATE: 70 BPM | WEIGHT: 286 LBS | RESPIRATION RATE: 16 BRPM | SYSTOLIC BLOOD PRESSURE: 134 MMHG | DIASTOLIC BLOOD PRESSURE: 70 MMHG | BODY MASS INDEX: 36.7 KG/M2 | OXYGEN SATURATION: 99 %

## 2021-10-07 DIAGNOSIS — K40.90 RIGHT INGUINAL HERNIA: Primary | ICD-10-CM

## 2021-10-07 DIAGNOSIS — Z11.59 ENCOUNTER FOR SCREENING FOR OTHER VIRAL DISEASES: ICD-10-CM

## 2021-10-07 DIAGNOSIS — K40.90 NON-RECURRENT UNILATERAL INGUINAL HERNIA WITHOUT OBSTRUCTION OR GANGRENE: Primary | ICD-10-CM

## 2021-10-07 PROCEDURE — 99204 OFFICE O/P NEW MOD 45 MIN: CPT | Performed by: SURGERY

## 2021-10-07 ASSESSMENT — MIFFLIN-ST. JEOR: SCORE: 2137.04
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
## 2021-10-07 NOTE — TELEPHONE ENCOUNTER
October 7, 2021    Keenan Sprague  [address] [place] [address]    We realize with scheduling surgery, one of your first questions is, how much will this cost?  Below we have provided you with the information you will need to receive an estimate for your surgery.    You are scheduled for the following procedure:  OPEN REPAIR RIGHT INGUINAL HERNIA WITH MESH         Surgeon:  Tamie Peralta MD      Physician Assistant:  Yes      Please make sure to have your insurance card available at the time of calling.    Surgeon & Physician Assistant charges and facility charges for [facility], [facility] or [facility]:    Consumer Price Line at [phone]   -  It is important to note that there may be a Physician Assistant assisting with your surgery.  Please be sure to mention this when calling for the estimate.      If you prefer, you can also request a price estimate online by completing the secure form at:  [email or web address]    Facility Charges at [facility], [facility] or [facility]:  [facility] at [phone]  [facility] at [phone]  [facility] at [phone] or [phone]    Anesthesiologist Charges:  [facility] at [phone]    [facility] - Nurse Anesthetist Charges:  [facility] at [phone]

## 2021-10-08 ENCOUNTER — OFFICE VISIT (OUTPATIENT)
Dept: INTERNAL MEDICINE | Facility: CLINIC | Age: 71
End: 2021-10-08
Payer: COMMERCIAL

## 2021-10-08 ENCOUNTER — DOCUMENTATION ONLY (OUTPATIENT)
Dept: SLEEP MEDICINE | Facility: CLINIC | Age: 71
End: 2021-10-08

## 2021-10-08 VITALS
HEART RATE: 87 BPM | DIASTOLIC BLOOD PRESSURE: 62 MMHG | HEIGHT: 74 IN | TEMPERATURE: 99.3 F | WEIGHT: 278.9 LBS | OXYGEN SATURATION: 95 % | RESPIRATION RATE: 16 BRPM | BODY MASS INDEX: 35.79 KG/M2 | SYSTOLIC BLOOD PRESSURE: 100 MMHG

## 2021-10-08 DIAGNOSIS — K40.90 RIGHT INGUINAL HERNIA: ICD-10-CM

## 2021-10-08 DIAGNOSIS — C61 MALIGNANT NEOPLASM OF PROSTATE (H): ICD-10-CM

## 2021-10-08 DIAGNOSIS — Z01.818 PRE-OP EXAM: Primary | ICD-10-CM

## 2021-10-08 DIAGNOSIS — I49.9 IRREGULAR HEART BEAT: ICD-10-CM

## 2021-10-08 DIAGNOSIS — Z86.711 HISTORY OF PULMONARY EMBOLISM: ICD-10-CM

## 2021-10-08 PROCEDURE — 99214 OFFICE O/P EST MOD 30 MIN: CPT | Performed by: FAMILY MEDICINE

## 2021-10-08 PROCEDURE — 93000 ELECTROCARDIOGRAM COMPLETE: CPT | Performed by: FAMILY MEDICINE

## 2021-10-08 ASSESSMENT — MIFFLIN-ST. JEOR: SCORE: 2104.83
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
## 2021-10-08 NOTE — NURSING NOTE
"FVR  rt inguinal hernia  Repair.  Vital signs:  Temp: 99.3  F (37.4  C) Temp src: Tympanic BP: 100/62 Pulse: 87   Resp: 16 SpO2: 95 %     Height: 188 cm (6' 2\") Weight: 126.5 kg (278 lb 14.4 oz)  Estimated body mass index is 35.81 kg/m  as calculated from the following:    Height as of this encounter: 1.88 m (6' 2\").    Weight as of this encounter: 126.5 kg (278 lb 14.4 oz).          "

## 2021-10-09 ENCOUNTER — LAB (OUTPATIENT)
Dept: LAB | Facility: CLINIC | Age: 71
End: 2021-10-09
Payer: COMMERCIAL

## 2021-10-09 DIAGNOSIS — Z11.59 ENCOUNTER FOR SCREENING FOR OTHER VIRAL DISEASES: ICD-10-CM

## 2021-10-09 PROCEDURE — U0003 INFECTIOUS AGENT DETECTION BY NUCLEIC ACID (DNA OR RNA); SEVERE ACUTE RESPIRATORY SYNDROME CORONAVIRUS 2 (SARS-COV-2) (CORONAVIRUS DISEASE [COVID-19]), AMPLIFIED PROBE TECHNIQUE, MAKING USE OF HIGH THROUGHPUT TECHNOLOGIES AS DESCRIBED BY CMS-2020-01-R: HCPCS

## 2021-10-10 ENCOUNTER — TELEPHONE (OUTPATIENT)
Dept: LAB | Facility: CLINIC | Age: 71
End: 2021-10-10

## 2021-10-10 LAB — SARS-COV-2 RNA RESP QL NAA+PROBE: POSITIVE

## 2021-10-10 NOTE — TELEPHONE ENCOUNTER
PRE-SURG    Coronavirus (COVID-19) Notification    Reason for call  Notify of POSITIVE  COVID-19 lab result, assess symptoms,  review [facility] recommendations    Lab Result   Lab test for 2019-nCoV rRt-PCR or SARS-COV-2 PCR  Oropharyngeal AND/OR nasopharyngeal swabs were POSITIVE for 2019-nCoV RNA [OR] SARS-COV-2 RNA (COVID-19) RNA     We have been unable to reach Patient by phone at this time to notify of their Positive COVID-19 result.  Unable to leave a voicemail message requesting a call back to [phone] [facility] for results erson that picked up states this is the wrong phone number.        POSITIVE COVID-19 Letter sent.    Brenna Hoffman LPN

## 2021-10-22 ENCOUNTER — LAB (OUTPATIENT)
Dept: LAB | Facility: CLINIC | Age: 71
End: 2021-10-22
Payer: COMMERCIAL

## 2021-10-22 DIAGNOSIS — C61 MALIGNANT NEOPLASM OF PROSTATE (H): ICD-10-CM

## 2021-10-22 LAB
ALBUMIN SERPL-MCNC: 3.1 G/DL (ref 3.4–5)
ALP SERPL-CCNC: 71 U/L (ref 40–150)
ALT SERPL W P-5'-P-CCNC: 24 U/L (ref 0–70)
ANION GAP SERPL CALCULATED.3IONS-SCNC: 8 MMOL/L (ref 3–14)
AST SERPL W P-5'-P-CCNC: 18 U/L (ref 0–45)
BASOPHILS # BLD AUTO: 0 10E3/UL (ref 0–0.2)
BASOPHILS NFR BLD AUTO: 1 %
BILIRUB SERPL-MCNC: 0.4 MG/DL (ref 0.2–1.3)
BUN SERPL-MCNC: 14 MG/DL (ref 7–30)
CALCIUM SERPL-MCNC: 8.8 MG/DL (ref 8.5–10.1)
CHLORIDE BLD-SCNC: 107 MMOL/L (ref 94–109)
CO2 SERPL-SCNC: 23 MMOL/L (ref 20–32)
CREAT SERPL-MCNC: 0.91 MG/DL (ref 0.66–1.25)
EOSINOPHIL # BLD AUTO: 0.1 10E3/UL (ref 0–0.7)
EOSINOPHIL NFR BLD AUTO: 1 %
ERYTHROCYTE [DISTWIDTH] IN BLOOD BY AUTOMATED COUNT: 13.5 % (ref 10–15)
GFR SERPL CREATININE-BSD FRML MDRD: 86 ML/MIN/1.73M2
GLUCOSE BLD-MCNC: 114 MG/DL (ref 70–99)
HCT VFR BLD AUTO: 36.2 % (ref 40–53)
HGB BLD-MCNC: 11.6 G/DL (ref 13.3–17.7)
IMM GRANULOCYTES # BLD: 0 10E3/UL
IMM GRANULOCYTES NFR BLD: 1 %
LYMPHOCYTES # BLD AUTO: 1.2 10E3/UL (ref 0.8–5.3)
LYMPHOCYTES NFR BLD AUTO: 19 %
MCH RBC QN AUTO: 29.4 PG (ref 26.5–33)
MCHC RBC AUTO-ENTMCNC: 32 G/DL (ref 31.5–36.5)
MCV RBC AUTO: 92 FL (ref 78–100)
MONOCYTES # BLD AUTO: 0.4 10E3/UL (ref 0–1.3)
MONOCYTES NFR BLD AUTO: 6 %
NEUTROPHILS # BLD AUTO: 4.3 10E3/UL (ref 1.6–8.3)
NEUTROPHILS NFR BLD AUTO: 72 %
NRBC # BLD AUTO: 0 10E3/UL
NRBC BLD AUTO-RTO: 0 /100
PLATELET # BLD AUTO: 199 10E3/UL (ref 150–450)
POTASSIUM BLD-SCNC: 4.1 MMOL/L (ref 3.4–5.3)
PROT SERPL-MCNC: 7.5 G/DL (ref 6.8–8.8)
PSA SERPL-MCNC: <0.01 UG/L (ref 0–4)
RBC # BLD AUTO: 3.94 10E6/UL (ref 4.4–5.9)
SODIUM SERPL-SCNC: 138 MMOL/L (ref 133–144)
WBC # BLD AUTO: 6 10E3/UL (ref 4–11)

## 2021-10-22 PROCEDURE — 82040 ASSAY OF SERUM ALBUMIN: CPT

## 2021-10-22 PROCEDURE — 85004 AUTOMATED DIFF WBC COUNT: CPT

## 2021-10-22 PROCEDURE — 84403 ASSAY OF TOTAL TESTOSTERONE: CPT

## 2021-10-22 PROCEDURE — 84153 ASSAY OF PSA TOTAL: CPT

## 2021-10-22 PROCEDURE — 36415 COLL VENOUS BLD VENIPUNCTURE: CPT

## 2021-10-23 ENCOUNTER — ANESTHESIA EVENT (OUTPATIENT)
Dept: SURGERY | Facility: CLINIC | Age: 71
End: 2021-10-23
Payer: COMMERCIAL

## 2021-10-27 ENCOUNTER — VIRTUAL VISIT (OUTPATIENT)
Dept: ONCOLOGY | Facility: CLINIC | Age: 71
End: 2021-10-27
Attending: PHYSICIAN ASSISTANT
Payer: COMMERCIAL

## 2021-10-27 DIAGNOSIS — C61 PROSTATE CANCER (H): Primary | ICD-10-CM

## 2021-10-27 LAB — TESTOST SERPL-MCNC: 51 NG/DL (ref 240–950)

## 2021-10-27 PROCEDURE — 999N001193 HC VIDEO/TELEPHONE VISIT; NO CHARGE

## 2021-10-27 PROCEDURE — 99214 OFFICE O/P EST MOD 30 MIN: CPT | Mod: 95 | Performed by: PHYSICIAN ASSISTANT

## 2021-10-27 NOTE — PROGRESS NOTES
Nathan is a 71 year old who is being evaluated via a billable telephone visit.      What phone number would you like to be contacted at? [phone]   How would you like to obtain your AVS? Farrahhardrew  Phone call duration: 12 minutes      MEDICAL ONCOLOGY CLINIC NOTE  Oct 27, 2021  Oncologist: Dr. Levi Prieto     REFERRING PHYSICIAN:  Maria C Mata MD, at [facility]   PRIMARY UROLOGIST:  Ever Rodgers MD, from [facility]      REASON FOR CURRENT VISIT: Follow-up while on abiraterone plus androgen deprivation therapy (ADT) for recurrent prostate cancer.  Dr. Davis's patient, who is transferring care to Dr. Prieto, due to Dr. Davis's departure from the [place].     HISTORY OF PRESENT ILLNESS:  Mr. Sprague is a delightful, 71 year old gentleman with diagnosis of recurrent prostate cancer. His oncologic history is detailed below.     ONCOLOGIC HISTORY:   1.  Recurrent prostate cancer.   - 12/01/2006:  Found to have a PSA of 16.3 on screening.   - 12/27/2006:  Prostate biopsy showed moderate to poorly differentiated adenocarcinoma, [place] 3 + 4 = 7 in right mid, right apex, right mid lateral and right apex lateral.  [place] 3 + 3 = 6, moderately differentiated adenocarcinoma in right base lateral.  Perineural or extraprostatic extension not seen in these biopsies.   - 07/2007: Opted for brachytherapy in combination with external beam radiation therapy.  This was delivered in 07/2007, exact details unknown. Also received 1 year of hormonal therapy with external beam radiation therapy.   - Was monitored closely by our Urology colleagues and had a PSA of 0.82 as of 03/02/2017. In Dr. Ever Rodgers's note, he mentioned that post brachytherapy, the PSA went down to undetectable, but then became detectable in 11/2008 at 0.17.  After that, it fluctuated in the low range until 2015 when it went up to 0.27.  Then up to 2.86 in 2016 and 0.82 in 2017.     - 02/27/2019:  PSA found to be suddenly  "elevated to 17.80.    - 02/22/2019:  CT chest angio protocol for unrelated reason (shortness of breath) did not show any evidence of metastatic pulmonary or mediastinal or skeletal disease.   - 03/06/2019:  CT abdomen and pelvis with contrast showed clustered retroperitoneal/periaortic lymph nodes with the largest measuring 17 mm in short axis and additionally another left retroperitoneal lymph node measuring 2.1 cm in short axis with no mesenteric lymphadenopathy.  No evidence of bony metastatic disease.   - 03/06/2019:  Nuclear medicine whole body bone scan showed new area of increased uptake in the right acromion and right humeral head, given the distribution is likely degenerative.  No other suspicious areas of tracer uptake.   - 03/13/2019: PSA 23.3. Testosterone: 23.30. 04/03/2019: PSA 21.50.   - March 2019:  tumor board discussion - recommendation by urology and rad onc to pursue systemic therapy.   - 04/03/2019: Started Lupron 22.5mg every 3 months.   - 04/09/2019: Started abiraterone 1000mg every day plus prednisone 5mg every day per LATTITUDE regimen.  - 07/24/2019: CT CAP and NM bone scan revealed stable disease with no evidence of disease progression.  - 08/27/2019: CT A/P without contrast stone protocol for hematuria- \"No acute abnormality in the abdomen or pelvis.  The small right inguinal hernia contains a short segment of nonobstructed small bowel.  Severe sigmoid diverticulosis.\"  - 11/6/19: CT C/A/P with contrast - \"Overall, stable exam. Scattered retroperitoneal lymph nodes are stable. One hypodense area is seemingly cystic in the retroperitoneum, unchanged in size and appearance.\" NM bone scan - no evidence of disease.  - 05/8/20: CT C/A/P with contrast - BRYCE. Previously pathologically abnormal RP LN have normalized. NM bone scan - BRYCE.  - 12/4/2020: CT C/A/P with contrast and NM bone scan - BRYCE.     6/19/20 PSA = <0.01  12/4/20 PSA =  <0.01  2/17/12 PSA  =  <0.01  3/25/21 PSA = <0.01  3/26/21 " Discontinue Zytiga.   5/26/21 PSA = <0.01, testosterone 4  7/29/21 PSA=<0.01  10/22/21 PSA = <0.01, testosterone 51    INTERVAL HISTORY:  Nathan was called for oncology follow-up visit today. He is generally feeling ok and doing well.   He tested positive for covid on 10/9. It was an asymptomatic test that he did prior to scheduled procedure. He had headaches, fevers, chills, and phlegm after testing positive. He has fully recovered now and was cleared by occupational health to go back to work. He started work again this past Monday. He works for [employer]. He is doing well. His breathing is good and normal. He denies fevers, chills, headaches, cough, CP, SOB, abd pain, nausea/vomiting, constipation/diarrhea. His energy has been ok.   His appetite is not so good, but he is eating/drinking. He reports eating a healthier diet now with more fruits.   Moving bowels regularly.   No new pain.     A 14 point review of system is negative except for as noted above.      PHYSICAL EXAM:  Vitals: There were no vitals taken for this visit.  Phone visit. No exam.  Voice is clear and strong. No audible stridor, wheezing, or respiratory distress. The remainder of PE was deferred due to PHE.      LABORATORY DATA: Reviewed labs from 10/22/21.      Ref. Range 10/22/2021 11:49   Sodium Latest Ref Range: 133 - 144 mmol/L 138   Potassium Latest Ref Range: 3.4 - 5.3 mmol/L 4.1   Chloride Latest Ref Range: 94 - 109 mmol/L 107   Carbon Dioxide Latest Ref Range: 20 - 32 mmol/L 23   Urea Nitrogen Latest Ref Range: 7 - 30 mg/dL 14   Creatinine Latest Ref Range: 0.66 - 1.25 mg/dL 0.91   GFR Estimate Latest Ref Range: >60 mL/min/1.73m2 86   Calcium Latest Ref Range: 8.5 - 10.1 mg/dL 8.8   Anion Gap Latest Ref Range: 3 - 14 mmol/L 8   Albumin Latest Ref Range: 3.4 - 5.0 g/dL 3.1 (L)   Protein Total Latest Ref Range: 6.8 - 8.8 g/dL 7.5   Bilirubin Total Latest Ref Range: 0.2 - 1.3 mg/dL 0.4   Alkaline Phosphatase Latest Ref Range: 40 -  150 U/L 71   ALT Latest Ref Range: 0 - 70 U/L 24   AST Latest Ref Range: 0 - 45 U/L 18   PSA Latest Ref Range: 0.00 - 4.00 ug/L <0.01   Testosterone Total Latest Ref Range: 240 - 950 ng/dL 51 (L)   Glucose Latest Ref Range: 70 - 99 mg/dL 114 (H)   WBC Latest Ref Range: 4.0 - 11.0 10e3/uL 6.0   Hemoglobin Latest Ref Range: 13.3 - 17.7 g/dL 11.6 (L)   Hematocrit Latest Ref Range: 40.0 - 53.0 % 36.2 (L)   Platelet Count Latest Ref Range: 150 - 450 10e3/uL 199   RBC Count Latest Ref Range: 4.40 - 5.90 10e6/uL 3.94 (L)   MCV Latest Ref Range: 78 - 100 fL 92   MCH Latest Ref Range: 26.5 - 33.0 pg 29.4   MCHC Latest Ref Range: 31.5 - 36.5 g/dL 32.0   RDW Latest Ref Range: 10.0 - 15.0 % 13.5   % Neutrophils Latest Units: % 72   % Lymphocytes Latest Units: % 19   % Monocytes Latest Units: % 6   % Eosinophils Latest Units: % 1   % Basophils Latest Units: % 1   Absolute Basophils Latest Ref Range: 0.0 - 0.2 10e3/uL 0.0   Absolute Eosinophils Latest Ref Range: 0.0 - 0.7 10e3/uL 0.1   Absolute Immature Granulocytes Latest Ref Range: <=0.0 10e3/uL 0.0   Absolute Lymphocytes Latest Ref Range: 0.8 - 5.3 10e3/uL 1.2   Absolute Monocytes Latest Ref Range: 0.0 - 1.3 10e3/uL 0.4   % Immature Granulocytes Latest Units: % 1   Absolute Neutrophils Latest Ref Range: 1.6 - 8.3 10e3/uL 4.3   Absolute NRBCs Latest Units: 10e3/uL 0.0   NRBCs per 100 WBC Latest Ref Range: <1 /100 0        ASSESSMENT AND PLAN:  A 71 year old gentleman with initially AUA intermediate-risk prostate adenocarcinoma, now with an oligometastatic retroperitoneal relapse.        1. Recurrent prostate cancer.   - Patient started abiraterone plus ADT for the recurrent oligometastatic prostate cancer based on  tumor board recommendations. The bone scan finding of right acromion region uptake was NOT due to malignancy, but from an arthroplasty in Jan 2019. This has continued to improve on bone scans and reported as degenerative.  - He continues to meet criteria for PCWG3  complete response (remission) at this time. This is exceptional response to therapy based on clinical assessment, undetectable PSA and negative scans.  - While recurrent oligometastatic disease is considered incurable, the median life expectancy for patients with low-volume oligometastatic disease such as this gentleman in the LATITUDE and STAMPEDE trials was in excess of 5 years.  This survival is expected to increase with the Presybeterian of several newer therapies in the [facility] setting.    -He has tolerated therapy very well for the last 2 years, his PSA levels are excellently controlled and there is no signs of disease recurrence on most recent imaging.  -He had manageable side effects from ADT like fatigue, lower extremity swelling, hypertension, weight gain.  -With this excellent control we stopped abiraterone and lupron (last dose was eligard 45mg on 12/8/2020) and have continued to monitor his PSA and testosterone. His PSA from 10/22 is <0.01. His testosterone is up a little which we expected, at 51. Will continue to monitor PSA levels closely and if/when PSA levels rise in particular in the context of a rising testosterone, we will re-image.     Plan:  1. Stopped Lupron - last 6 month dose was in December 2020; stopped Abiraterone in June.   2. Expect rise in T - 51 from 10/22.   3. Follow PSA - <0.01 from 10/22.  4.  If/when a substantial rise in PSA occurs in particular in the context of a rising testosterone we will reimage with next generation imaging and consider whether metastasis directed therapy such as radiation or return to hormonal therapy is most appropriate at that time.  5. Eventually we will consider doing next generation sequencing to determine if additional therapy is needed should castration resistant disease develop.  6.  Continued to endorse continued light and duty at work due to the fatigue and loss of muscle mass that occur through androgen deprivation therapy in a patient with a very  physical job. With the eventual return of testosterone the situation may change.  7. We will likely do a PSMA PET when/if the PSA rises.  8. Labs in 3 weeks to follow-up on anemia. No bleeding. Consider anemia labs if hgb continues to down-trend.  9. Labs/follow-up with Dr. Prieto in 3 months, sooner if needed.     Phone Call Duration: 12 minutes     35 minutes spent on the date of the encounter doing chart review, review of test results, interpretation of tests, patient visit, documentation and discussion with other provider(s)     Elyse Herrera PA-C  [facility]  [address] [place] [address] [id number] [phone]

## 2021-10-29 ENCOUNTER — OFFICE VISIT (OUTPATIENT)
Dept: SURGERY | Facility: PHYSICIAN GROUP | Age: 71
End: 2021-10-29
Payer: COMMERCIAL

## 2021-10-29 ENCOUNTER — HOSPITAL ENCOUNTER (OUTPATIENT)
Facility: CLINIC | Age: 71
Discharge: HOME | End: 2021-10-29
Attending: SURGERY | Admitting: SURGERY
Payer: COMMERCIAL

## 2021-10-29 ENCOUNTER — ANESTHESIA (OUTPATIENT)
Dept: SURGERY | Facility: CLINIC | Age: 71
End: 2021-10-29
Payer: COMMERCIAL

## 2021-10-29 VITALS
OXYGEN SATURATION: 96 % | RESPIRATION RATE: 18 BRPM | DIASTOLIC BLOOD PRESSURE: 85 MMHG | TEMPERATURE: 97.1 F | SYSTOLIC BLOOD PRESSURE: 141 MMHG | HEIGHT: 74 IN | HEART RATE: 73 BPM | WEIGHT: 265 LBS | BODY MASS INDEX: 34.01 KG/M2

## 2021-10-29 DIAGNOSIS — K40.90 NON-RECURRENT UNILATERAL INGUINAL HERNIA WITHOUT OBSTRUCTION OR GANGRENE: Primary | ICD-10-CM

## 2021-10-29 DIAGNOSIS — K40.90 RIGHT INGUINAL HERNIA: ICD-10-CM

## 2021-10-29 DIAGNOSIS — Z91.199 FAILURE TO ATTEND APPOINTMENT: Primary | ICD-10-CM

## 2021-10-29 PROCEDURE — 250N000013 HC RX MED GY IP 250 OP 250 PS 637: Performed by: SURGERY

## 2021-10-29 PROCEDURE — 49505 PRP I/HERN INIT REDUC >5 YR: CPT | Mod: RT | Performed by: PHYSICIAN ASSISTANT

## 2021-10-29 PROCEDURE — C1781 MESH (IMPLANTABLE): HCPCS | Performed by: SURGERY

## 2021-10-29 PROCEDURE — 250N000009 HC RX 250: Performed by: SURGERY

## 2021-10-29 PROCEDURE — 258N000003 HC RX IP 258 OP 636: Performed by: ANESTHESIOLOGY

## 2021-10-29 PROCEDURE — 250N000009 HC RX 250: Performed by: NURSE ANESTHETIST, CERTIFIED REGISTERED

## 2021-10-29 PROCEDURE — 250N000011 HC RX IP 250 OP 636: Performed by: NURSE ANESTHETIST, CERTIFIED REGISTERED

## 2021-10-29 PROCEDURE — 49505 PRP I/HERN INIT REDUC >5 YR: CPT | Mod: RT | Performed by: SURGERY

## 2021-10-29 PROCEDURE — 999N000141 HC STATISTIC PRE-PROCEDURE NURSING ASSESSMENT: Performed by: SURGERY

## 2021-10-29 PROCEDURE — 250N000009 HC RX 250: Performed by: ANESTHESIOLOGY

## 2021-10-29 PROCEDURE — 710N000009 HC RECOVERY PHASE 1, LEVEL 1, PER MIN: Performed by: SURGERY

## 2021-10-29 PROCEDURE — 370N000017 HC ANESTHESIA TECHNICAL FEE, PER MIN: Performed by: SURGERY

## 2021-10-29 PROCEDURE — 710N000012 HC RECOVERY PHASE 2, PER MINUTE: Performed by: SURGERY

## 2021-10-29 PROCEDURE — 360N000075 HC SURGERY LEVEL 2, PER MIN: Performed by: SURGERY

## 2021-10-29 PROCEDURE — 272N000001 HC OR GENERAL SUPPLY STERILE: Performed by: SURGERY

## 2021-10-29 PROCEDURE — 250N000011 HC RX IP 250 OP 636: Performed by: PHYSICIAN ASSISTANT

## 2021-10-29 DEVICE — MESH PROGRIP 4.7X3" PARIETEX RIGHT TEM1208GR: Type: IMPLANTABLE DEVICE | Site: GROIN | Status: FUNCTIONAL

## 2021-10-29 RX ORDER — OXYCODONE HYDROCHLORIDE 5 MG/1
5 TABLET ORAL
Status: COMPLETED | OUTPATIENT
Start: 2021-10-29 | End: 2021-10-29

## 2021-10-29 RX ORDER — OXYCODONE HYDROCHLORIDE 5 MG/1
5 TABLET ORAL EVERY 4 HOURS PRN
Status: DISCONTINUED | OUTPATIENT
Start: 2021-10-29 | End: 2021-10-29

## 2021-10-29 RX ORDER — BUPIVACAINE HYDROCHLORIDE 5 MG/ML
INJECTION, SOLUTION EPIDURAL; INTRACAUDAL PRN
Status: DISCONTINUED | OUTPATIENT
Start: 2021-10-29 | End: 2021-10-29 | Stop reason: HOSPADM

## 2021-10-29 RX ORDER — ONDANSETRON 2 MG/ML
4 INJECTION INTRAMUSCULAR; INTRAVENOUS EVERY 30 MIN PRN
Status: CANCELLED | OUTPATIENT
Start: 2021-10-29

## 2021-10-29 RX ORDER — SODIUM CHLORIDE, SODIUM LACTATE, POTASSIUM CHLORIDE, CALCIUM CHLORIDE 600; 310; 30; 20 MG/100ML; MG/100ML; MG/100ML; MG/100ML
INJECTION, SOLUTION INTRAVENOUS CONTINUOUS
Status: CANCELLED | OUTPATIENT
Start: 2021-10-29

## 2021-10-29 RX ORDER — DEXAMETHASONE SODIUM PHOSPHATE 4 MG/ML
INJECTION, SOLUTION INTRA-ARTICULAR; INTRALESIONAL; INTRAMUSCULAR; INTRAVENOUS; SOFT TISSUE PRN
Status: DISCONTINUED | OUTPATIENT
Start: 2021-10-29 | End: 2021-10-29

## 2021-10-29 RX ORDER — ACETAMINOPHEN 500 MG
1000 TABLET ORAL EVERY 6 HOURS PRN
COMMUNITY
Start: 2021-10-29

## 2021-10-29 RX ORDER — MEPERIDINE HYDROCHLORIDE 25 MG/ML
12.5 INJECTION INTRAMUSCULAR; INTRAVENOUS; SUBCUTANEOUS
Status: CANCELLED | OUTPATIENT
Start: 2021-10-29

## 2021-10-29 RX ORDER — OXYCODONE HYDROCHLORIDE 5 MG/1
5 TABLET ORAL EVERY 4 HOURS PRN
Status: CANCELLED | OUTPATIENT
Start: 2021-10-29

## 2021-10-29 RX ORDER — GLYCOPYRROLATE 0.2 MG/ML
INJECTION, SOLUTION INTRAMUSCULAR; INTRAVENOUS PRN
Status: DISCONTINUED | OUTPATIENT
Start: 2021-10-29 | End: 2021-10-29

## 2021-10-29 RX ORDER — CEFAZOLIN SODIUM IN 0.9 % NACL 3 G/100 ML
3 INTRAVENOUS SOLUTION, PIGGYBACK (ML) INTRAVENOUS
Status: DISCONTINUED | OUTPATIENT
Start: 2021-10-29 | End: 2021-10-29 | Stop reason: HOSPADM

## 2021-10-29 RX ORDER — FENTANYL CITRATE 50 UG/ML
50 INJECTION, SOLUTION INTRAMUSCULAR; INTRAVENOUS EVERY 5 MIN PRN
Status: CANCELLED | OUTPATIENT
Start: 2021-10-29

## 2021-10-29 RX ORDER — HYDROMORPHONE HCL IN WATER/PF 6 MG/30 ML
0.4 PATIENT CONTROLLED ANALGESIA SYRINGE INTRAVENOUS EVERY 5 MIN PRN
Status: DISCONTINUED | OUTPATIENT
Start: 2021-10-29 | End: 2021-10-29 | Stop reason: HOSPADM

## 2021-10-29 RX ORDER — GLYCINE 1.5 G/100ML
SOLUTION IRRIGATION PRN
Status: DISCONTINUED | OUTPATIENT
Start: 2021-10-29 | End: 2021-10-29 | Stop reason: HOSPADM

## 2021-10-29 RX ORDER — METOPROLOL TARTRATE 1 MG/ML
1-2 INJECTION, SOLUTION INTRAVENOUS EVERY 5 MIN PRN
Status: DISCONTINUED | OUTPATIENT
Start: 2021-10-29 | End: 2021-10-29 | Stop reason: HOSPADM

## 2021-10-29 RX ORDER — ONDANSETRON 2 MG/ML
4 INJECTION INTRAMUSCULAR; INTRAVENOUS EVERY 30 MIN PRN
Status: DISCONTINUED | OUTPATIENT
Start: 2021-10-29 | End: 2021-10-29 | Stop reason: HOSPADM

## 2021-10-29 RX ORDER — PROPOFOL 10 MG/ML
INJECTION, EMULSION INTRAVENOUS PRN
Status: DISCONTINUED | OUTPATIENT
Start: 2021-10-29 | End: 2021-10-29

## 2021-10-29 RX ORDER — FENTANYL CITRATE 50 UG/ML
25 INJECTION, SOLUTION INTRAMUSCULAR; INTRAVENOUS
Status: DISCONTINUED | OUTPATIENT
Start: 2021-10-29 | End: 2021-10-29 | Stop reason: HOSPADM

## 2021-10-29 RX ORDER — ONDANSETRON 4 MG/1
4 TABLET, ORALLY DISINTEGRATING ORAL EVERY 30 MIN PRN
Status: DISCONTINUED | OUTPATIENT
Start: 2021-10-29 | End: 2021-10-29 | Stop reason: HOSPADM

## 2021-10-29 RX ORDER — MAGNESIUM HYDROXIDE 1200 MG/15ML
LIQUID ORAL PRN
Status: DISCONTINUED | OUTPATIENT
Start: 2021-10-29 | End: 2021-10-29 | Stop reason: HOSPADM

## 2021-10-29 RX ORDER — SODIUM CHLORIDE, SODIUM LACTATE, POTASSIUM CHLORIDE, CALCIUM CHLORIDE 600; 310; 30; 20 MG/100ML; MG/100ML; MG/100ML; MG/100ML
INJECTION, SOLUTION INTRAVENOUS CONTINUOUS
Status: DISCONTINUED | OUTPATIENT
Start: 2021-10-29 | End: 2021-10-29 | Stop reason: HOSPADM

## 2021-10-29 RX ORDER — ONDANSETRON 4 MG/1
4 TABLET, ORALLY DISINTEGRATING ORAL EVERY 30 MIN PRN
Status: CANCELLED | OUTPATIENT
Start: 2021-10-29

## 2021-10-29 RX ORDER — MEPERIDINE HYDROCHLORIDE 25 MG/ML
12.5 INJECTION INTRAMUSCULAR; INTRAVENOUS; SUBCUTANEOUS
Status: DISCONTINUED | OUTPATIENT
Start: 2021-10-29 | End: 2021-10-29 | Stop reason: HOSPADM

## 2021-10-29 RX ORDER — FENTANYL CITRATE 50 UG/ML
50 INJECTION, SOLUTION INTRAMUSCULAR; INTRAVENOUS EVERY 5 MIN PRN
Status: DISCONTINUED | OUTPATIENT
Start: 2021-10-29 | End: 2021-10-29 | Stop reason: HOSPADM

## 2021-10-29 RX ORDER — CEFAZOLIN SODIUM IN 0.9 % NACL 3 G/100 ML
3 INTRAVENOUS SOLUTION, PIGGYBACK (ML) INTRAVENOUS SEE ADMIN INSTRUCTIONS
Status: DISCONTINUED | OUTPATIENT
Start: 2021-10-29 | End: 2021-10-29 | Stop reason: HOSPADM

## 2021-10-29 RX ORDER — OXYCODONE HYDROCHLORIDE 5 MG/1
5-10 TABLET ORAL EVERY 4 HOURS PRN
Qty: 20 TABLET | Refills: 0 | Status: SHIPPED | OUTPATIENT
Start: 2021-10-29

## 2021-10-29 RX ORDER — FENTANYL CITRATE 50 UG/ML
INJECTION, SOLUTION INTRAMUSCULAR; INTRAVENOUS PRN
Status: DISCONTINUED | OUTPATIENT
Start: 2021-10-29 | End: 2021-10-29

## 2021-10-29 RX ORDER — FENTANYL CITRATE 50 UG/ML
25 INJECTION, SOLUTION INTRAMUSCULAR; INTRAVENOUS
Status: CANCELLED | OUTPATIENT
Start: 2021-10-29

## 2021-10-29 RX ORDER — LIDOCAINE 40 MG/G
CREAM TOPICAL
Status: DISCONTINUED | OUTPATIENT
Start: 2021-10-29 | End: 2021-10-29 | Stop reason: HOSPADM

## 2021-10-29 RX ORDER — ACETAMINOPHEN 325 MG/1
650 TABLET ORAL
Status: DISCONTINUED | OUTPATIENT
Start: 2021-10-29 | End: 2021-10-29 | Stop reason: HOSPADM

## 2021-10-29 RX ORDER — EPHEDRINE SULFATE 50 MG/ML
INJECTION, SOLUTION INTRAVENOUS PRN
Status: DISCONTINUED | OUTPATIENT
Start: 2021-10-29 | End: 2021-10-29

## 2021-10-29 RX ORDER — ONDANSETRON 2 MG/ML
INJECTION INTRAMUSCULAR; INTRAVENOUS PRN
Status: DISCONTINUED | OUTPATIENT
Start: 2021-10-29 | End: 2021-10-29

## 2021-10-29 RX ADMIN — EPHEDRINE SULFATE 5 MG: 50 INJECTION, SOLUTION INTRAVENOUS at 14:50

## 2021-10-29 RX ADMIN — OXYCODONE HYDROCHLORIDE 5 MG: 5 TABLET ORAL at 16:13

## 2021-10-29 RX ADMIN — PROPOFOL 200 MG: 10 INJECTION, EMULSION INTRAVENOUS at 14:20

## 2021-10-29 RX ADMIN — EPHEDRINE SULFATE 5 MG: 50 INJECTION, SOLUTION INTRAVENOUS at 14:38

## 2021-10-29 RX ADMIN — EPHEDRINE SULFATE 5 MG: 50 INJECTION, SOLUTION INTRAVENOUS at 14:44

## 2021-10-29 RX ADMIN — FENTANYL CITRATE 100 MCG: 50 INJECTION, SOLUTION INTRAMUSCULAR; INTRAVENOUS at 14:20

## 2021-10-29 RX ADMIN — LIDOCAINE HYDROCHLORIDE 50 MG: 10 INJECTION, SOLUTION EPIDURAL; INFILTRATION; INTRACAUDAL; PERINEURAL at 14:20

## 2021-10-29 RX ADMIN — FENTANYL CITRATE 50 MCG: 50 INJECTION, SOLUTION INTRAMUSCULAR; INTRAVENOUS at 14:38

## 2021-10-29 RX ADMIN — DEXAMETHASONE SODIUM PHOSPHATE 4 MG: 4 INJECTION, SOLUTION INTRA-ARTICULAR; INTRALESIONAL; INTRAMUSCULAR; INTRAVENOUS; SOFT TISSUE at 14:20

## 2021-10-29 RX ADMIN — EPHEDRINE SULFATE 10 MG: 50 INJECTION, SOLUTION INTRAVENOUS at 14:27

## 2021-10-29 RX ADMIN — ONDANSETRON HYDROCHLORIDE 4 MG: 2 INJECTION, SOLUTION INTRAVENOUS at 15:31

## 2021-10-29 RX ADMIN — Medication 2 G: at 14:14

## 2021-10-29 RX ADMIN — SODIUM CHLORIDE, POTASSIUM CHLORIDE, SODIUM LACTATE AND CALCIUM CHLORIDE: 600; 310; 30; 20 INJECTION, SOLUTION INTRAVENOUS at 14:15

## 2021-10-29 RX ADMIN — MIDAZOLAM 2 MG: 1 INJECTION INTRAMUSCULAR; INTRAVENOUS at 14:16

## 2021-10-29 RX ADMIN — GLYCOPYRROLATE 0.2 MG: 0.2 INJECTION, SOLUTION INTRAMUSCULAR; INTRAVENOUS at 14:20

## 2021-10-29 RX ADMIN — FENTANYL CITRATE 50 MCG: 50 INJECTION, SOLUTION INTRAMUSCULAR; INTRAVENOUS at 14:50

## 2021-10-29 ASSESSMENT — ENCOUNTER SYMPTOMS: DYSRHYTHMIAS: 0

## 2021-10-29 ASSESSMENT — MIFFLIN-ST. JEOR: SCORE: 2036.78

## 2021-10-29 ASSESSMENT — LIFESTYLE VARIABLES: TOBACCO_USE: 1
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
## 2021-10-29 NOTE — DISCHARGE INSTRUCTIONS

## 2021-10-29 NOTE — OP NOTE
General Surgery Operative Note      Pre-operative diagnosis: Right inguinal hernia   Post-operative diagnosis: Direct and indirect right inguinal hernias    Procedure: Right inguinal hernia repair with progrip mesh in Nat fashion    Surgeon: Tamie Peralta MD   Assistant(s): Rocio Draper PA-C   Anesthesia: General    Estimated blood loss:  Complications: 10 cc  None   Specimens: None     DESCRIPTION OF PROCEDURE:  The patient was placed on the table in supine position.  General anesthesia was induced and the abdomen was prepped and draped in sterile fashion.  An inguinal incision was made on the left side and was carried down into the subcutaneous tissue.  The superficial epigastric vessels were ligated and divided with 2-0 Vicryl ties.  The external oblique aponeurosis was cleared of subcutaneous tissue.  Local anesthetic was infused under the aponeurosis.  This was incised with a #15 blade.  The incision was carried medially to the level of the external ring with the Metzenbaum scissors.  Each leaflet of the external oblique aponeurosis was grasped with a Kimberly clamp.  The undersurface was cleared with a sweeping motion of the surgeon's finger.  The Weitlaner retractor was used to expose the inguinal canal. This allowed for good exposure of the inguinal canal.  We freed up the cord structures from the pubic tubercle bluntly and placed a Penrose drain around them.  This revealed a moderate direct defect in the floor of the inguinal canal.  We then performed a thorough evaluation of the spermatic cord by dividing the cremasteric muscles at the level of the internal ring.  A small indirect hernia defect was noted.  The indirect hernia sac was isolated and  suture ligated at the level of the internal ring.   We then imbricated the floor of the inguinal canal medial to the epigastric vessels using a running 2-0 PDS suture.  We then brought in a piece anatomical progrip mesh to the field We adjoined the  mesh to the pubic tubercle medially with a 2-0 PDS suture and ran it along the shelving edge of the inguinal ligament inferiorly with the same 2-0 PDS suture. The superior aspect of the mesh was secured to the conjoined tendon and to the floor of the inguinal canal laterally with interrupted 2-0 PDS sutures.  The mesh overlay the pubic tubercle by 3 cm medially.  Local anesthetic was injected at all suture sites and in the skin and subcutaneous tissue.  We inspected for hemostasis and irrigated with sterile saline.  We closed the external oblique aponeurosis with a 2-0 Vicryl suture in running fashion.  Anshul's fascia was closed with running 3-0 Vicryl. The skin was closed with a running 4-0 Vicryl subcuticular suture and Dermabond.  The patient tolerated the procedure well.  Sponge and instrument counts were correct at the end of the case.    Tamie Peralta MD

## 2021-10-29 NOTE — ANESTHESIA POSTPROCEDURE EVALUATION
Patient: Keenan Sprague    Procedure: Procedure(s):  OPEN REPAIR RIGHT INGUINAL HERNIA WITH MESH       Diagnosis:Right inguinal hernia [K40.90]  Diagnosis Additional Information: No value filed.    Anesthesia Type:  General    Note:  Disposition: Outpatient   Postop Pain Control: Uneventful            Sign Out: Well controlled pain   PONV: No   Neuro/Psych: Uneventful            Sign Out: Acceptable/Baseline neuro status   Airway/Respiratory: Uneventful            Sign Out: Acceptable/Baseline resp. status   CV/Hemodynamics: Uneventful            Sign Out: Acceptable CV status   Other NRE: NONE   DID A NON-ROUTINE EVENT OCCUR? No           Last vitals:  Vitals Value Taken Time   /71 10/29/21 1630   Temp 97.2  F (36.2  C) 10/29/21 1630   Pulse 68 10/29/21 1640   Resp 18 10/29/21 1640   SpO2 99 % 10/29/21 1640   Vitals shown include unvalidated device data.    Electronically Signed By: Chung Greene MD  October 29, 2021  6:29 PM

## 2021-10-29 NOTE — PLAN OF CARE
"ALL of patient's \"locked valuables\" returned to patient and spouse.  See Receipt in chart/Epic.  "

## 2021-10-29 NOTE — ANESTHESIA CARE TRANSFER NOTE
Patient: Keenan Sprague    Procedure: Procedure(s):  OPEN REPAIR RIGHT INGUINAL HERNIA WITH MESH       Diagnosis: Right inguinal hernia [K40.90]  Diagnosis Additional Information: No value filed.    Anesthesia Type:   General     Note:    Oropharynx: oropharynx clear of all foreign objects  Level of Consciousness: awake  Oxygen Supplementation: face mask    Independent Airway: airway patency satisfactory and stable  Dentition: dentition unchanged  Vital Signs Stable: post-procedure vital signs reviewed and stable  Report to RN Given: handoff report given  Patient transferred to: PACU    Handoff Report: Identifed the Patient, Identified the Reponsible Provider, Reviewed the pertinent medical history, Discussed the surgical course, Reviewed Intra-OP anesthesia mangement and issues during anesthesia, Set expectations for post-procedure period and Allowed opportunity for questions and acknowledgement of understanding      Vitals:  Vitals Value Taken Time   /84 10/29/21 1542   Temp     Pulse 70 10/29/21 1545   Resp 27 10/29/21 1545   SpO2 99 % 10/29/21 1545   Vitals shown include unvalidated device data.    Electronically Signed By: ARIADNE Diaz CRNA  October 29, 2021  3:46 PM

## 2021-11-16 ENCOUNTER — MYC MEDICAL ADVICE (OUTPATIENT)
Dept: SURGERY | Facility: PHYSICIAN GROUP | Age: 71
End: 2021-11-16

## 2021-11-16 ENCOUNTER — TELEPHONE (OUTPATIENT)
Dept: SURGERY | Facility: CLINIC | Age: 71
End: 2021-11-16
Payer: COMMERCIAL

## 2021-11-16 DIAGNOSIS — Z98.890 POSTOPERATIVE STATE: Primary | ICD-10-CM

## 2021-11-16 PROCEDURE — 99024 POSTOP FOLLOW-UP VISIT: CPT | Performed by: PHYSICIAN ASSISTANT

## 2021-11-16 NOTE — TELEPHONE ENCOUNTER
"[facility]   Postoperative Follow-up Phone Call  -Call to patient to review recent procedure and recovery    Attempted to call patient for post op check.  No answer.  Message was left for patient to call back if they had any questions of concerns.  [employer] message sent.    Rocoi Draper PA-C     [employer] message:   Pushpa Evans,     I left a message on your ph# to call if you have questions/concerns after your hernia surgery.    You were found to have two defects at your hernia site which were both repaired with a single \"Progrip Mesh\".     You may now remove skin glue or tapes from incision sites.    You may slowly increase activity as tolerated; generally this is a slow process as follows: 30lb limit at 3 weeks, 40lb limit at 4 weeks, 50lb limit at 5 weeks, etc, until resuming all \"normal\" rigors/activities by 8-10 weeks after surgery.  Attempt to return to any type of workout/strenuous activity should be held until 1 month after surgery; start slowly and increase as able.     Please contact me if other questions or work note needed.    Thank you!  Rocio Draper PA-C   "

## 2021-11-17 DIAGNOSIS — C61 PROSTATE CANCER (H): Primary | ICD-10-CM

## 2021-11-19 ENCOUNTER — PATIENT OUTREACH (OUTPATIENT)
Dept: ONCOLOGY | Facility: CLINIC | Age: 71
End: 2021-11-19
Payer: COMMERCIAL

## 2021-11-19 NOTE — PROGRESS NOTES
TC to pt per Elyse asking him to get a CBC checked per her plan at his last ASHLEY visit. Pt stated understanding and will go to [facility] this week to get it checked and agreed to call with any questions or concerns.

## 2021-11-22 ENCOUNTER — LAB (OUTPATIENT)
Dept: LAB | Facility: CLINIC | Age: 71
End: 2021-11-22
Payer: COMMERCIAL

## 2021-11-22 DIAGNOSIS — C61 PROSTATE CANCER (H): ICD-10-CM

## 2021-11-22 LAB
BASOPHILS # BLD AUTO: 0 10E3/UL (ref 0–0.2)
BASOPHILS NFR BLD AUTO: 1 %
EOSINOPHIL # BLD AUTO: 0.3 10E3/UL (ref 0–0.7)
EOSINOPHIL NFR BLD AUTO: 5 %
ERYTHROCYTE [DISTWIDTH] IN BLOOD BY AUTOMATED COUNT: 15.5 % (ref 10–15)
HCT VFR BLD AUTO: 38.1 % (ref 40–53)
HGB BLD-MCNC: 11.9 G/DL (ref 13.3–17.7)
IMM GRANULOCYTES # BLD: 0 10E3/UL
IMM GRANULOCYTES NFR BLD: 0 %
LYMPHOCYTES # BLD AUTO: 1.5 10E3/UL (ref 0.8–5.3)
LYMPHOCYTES NFR BLD AUTO: 30 %
MCH RBC QN AUTO: 29.8 PG (ref 26.5–33)
MCHC RBC AUTO-ENTMCNC: 31.2 G/DL (ref 31.5–36.5)
MCV RBC AUTO: 96 FL (ref 78–100)
MONOCYTES # BLD AUTO: 0.5 10E3/UL (ref 0–1.3)
MONOCYTES NFR BLD AUTO: 9 %
NEUTROPHILS # BLD AUTO: 2.7 10E3/UL (ref 1.6–8.3)
NEUTROPHILS NFR BLD AUTO: 55 %
NRBC # BLD AUTO: 0 10E3/UL
NRBC BLD AUTO-RTO: 0 /100
PLATELET # BLD AUTO: 153 10E3/UL (ref 150–450)
RBC # BLD AUTO: 3.99 10E6/UL (ref 4.4–5.9)
WBC # BLD AUTO: 5 10E3/UL (ref 4–11)

## 2021-11-22 PROCEDURE — 85004 AUTOMATED DIFF WBC COUNT: CPT

## 2021-11-22 PROCEDURE — 36415 COLL VENOUS BLD VENIPUNCTURE: CPT

## 2021-11-26 ENCOUNTER — VIRTUAL VISIT (OUTPATIENT)
Dept: INTERNAL MEDICINE | Facility: CLINIC | Age: 71
End: 2021-11-26
Payer: COMMERCIAL

## 2021-11-26 ENCOUNTER — NURSE TRIAGE (OUTPATIENT)
Dept: INTERNAL MEDICINE | Facility: CLINIC | Age: 71
End: 2021-11-26

## 2021-11-26 DIAGNOSIS — J01.90 ACUTE SINUSITIS WITH SYMPTOMS > 10 DAYS: Primary | ICD-10-CM

## 2021-11-26 PROCEDURE — 99213 OFFICE O/P EST LOW 20 MIN: CPT | Mod: 95 | Performed by: INTERNAL MEDICINE

## 2021-11-26 NOTE — TELEPHONE ENCOUNTER
S-(situation): suspected sinusitis    B-(background): 2 day history of head congestion but started getting pain behind his eyes 10 days ago.    A-(assessment): Patient complains of head congestion, pain across cheeks and behind his eyes, post nasal drip, blowing dark green mucus. States he knows when he has a sinus infection and that is what is going on now. He denies fever, breathing difficulty or chest pain.    R-(recommendations): Patient wants to have a prescription for an antibiotic sent to an [facility] as he is out of town.  Advised patient to request an E-visit through My Chart but he states he has no computer and does not know how to access his My Chart on his phone and is not about to learn how to do it at his age.  He states he is old enough to know when he has a sinus infection and would like an antibiotic sent to pharmacy. CHELO Britton R.N.

## 2021-11-26 NOTE — PROGRESS NOTES
"Nathan is a 71 year old who is being evaluated via a billable telephone visit.      What phone number would you like to be contacted at? [phone]  How would you like to obtain your AVS? Mail a copy    Assessment & Plan     Acute sinusitis with symptoms > 10 days  Symptoms for 2 weeks. Will treat with antibiotic.   Continue symptomatic treatment   - amoxicillin-clavulanate (AUGMENTIN) 875-125 MG tablet; Take 1 tablet by mouth 2 times daily             Tobacco Cessation:   reports that he has been smoking cigarettes. He started smoking about 11 years ago. He has a 8.75 pack-year smoking history. He has never used smokeless tobacco.      BMI:   Estimated body mass index is 34.02 kg/m  as calculated from the following:    Height as of 10/29/21: 1.88 m (6' 2\").    Weight as of 10/29/21: 120.2 kg (265 lb).       See Patient Instructions    No follow-ups on file.    Jono Huber MD  [facility]    Subjective   Nathan is a 71 year old who presents for the following health issues     HPI   Chief Complaint   Patient presents with     Sinus Problem     Sinus pain/pressure, HA, nasal congestion          Presents with sinus symptoms - nasal congestions, green nasal secretions, headaches and pressure around the eyes.   Symptoms for 12 days. Not improved with OTC treatment, decongestants.   No fever, chills. No SOB, CP.   Has had COVID in sep and recovered.       Review of Systems   Constitutional, HEENT, cardiovascular, pulmonary, gi and gu systems are negative, except as otherwise noted.      Objective           Vitals:  No vitals were obtained today due to virtual visit.    Physical Exam   healthy, alert and no distress  PSYCH: Alert and oriented times 3; coherent speech, normal   rate and volume, able to articulate logical thoughts, able   to abstract reason, no tangential thoughts, no hallucinations   or delusions  His affect is normal  RESP: No cough, no audible wheezing, able to talk in full " sentences  Remainder of exam unable to be completed due to telephone visits    Lab on 11/22/2021   Component Date Value Ref Range Status     WBC Count 11/22/2021 5.0  4.0 - 11.0 10e3/uL Final     RBC Count 11/22/2021 3.99* 4.40 - 5.90 10e6/uL Final     Hemoglobin 11/22/2021 11.9* 13.3 - 17.7 g/dL Final     Hematocrit 11/22/2021 38.1* 40.0 - 53.0 % Final     MCV 11/22/2021 96  78 - 100 fL Final     MCH 11/22/2021 29.8  26.5 - 33.0 pg Final     MCHC 11/22/2021 31.2* 31.5 - 36.5 g/dL Final     RDW 11/22/2021 15.5* 10.0 - 15.0 % Final     Platelet Count 11/22/2021 153  150 - 450 10e3/uL Final     % Neutrophils 11/22/2021 55  % Final     % Lymphocytes 11/22/2021 30  % Final     % Monocytes 11/22/2021 9  % Final     % Eosinophils 11/22/2021 5  % Final     % Basophils 11/22/2021 1  % Final     % Immature Granulocytes 11/22/2021 0  % Final     NRBCs per 100 WBC 11/22/2021 0  <1 /100 Final     Absolute Neutrophils 11/22/2021 2.7  1.6 - 8.3 10e3/uL Final     Absolute Lymphocytes 11/22/2021 1.5  0.8 - 5.3 10e3/uL Final     Absolute Monocytes 11/22/2021 0.5  0.0 - 1.3 10e3/uL Final     Absolute Eosinophils 11/22/2021 0.3  0.0 - 0.7 10e3/uL Final     Absolute Basophils 11/22/2021 0.0  0.0 - 0.2 10e3/uL Final     Absolute Immature Granulocytes 11/22/2021 0.0  <=0.0 10e3/uL Final     Absolute NRBCs 11/22/2021 0.0  10e3/uL Final               Phone call duration: 7 minutes

## 2022-02-17 NOTE — LETTER
10/27/2021         RE: Keenan Sprague  [address]  [facility] [address]      Nathan is a 71 year old who is being evaluated via a billable telephone visit.      What phone number would you like to be contacted at? [phone]   How would you like to obtain your AVS? MyChart  Phone call duration: 12 minutes      MEDICAL ONCOLOGY CLINIC NOTE  Oct 27, 2021  Oncologist: Dr. Levi Prieto     REFERRING PHYSICIAN:  Maria C Mata MD, at [facility]   PRIMARY UROLOGIST:  Ever Rodgers MD, from [facility]      REASON FOR CURRENT VISIT: Follow-up while on abiraterone plus androgen deprivation therapy (ADT) for recurrent prostate cancer.  Dr. Davis's patient, who is transferring care to Dr. Prieto, due to Dr. Davis's departure from the [place].     HISTORY OF PRESENT ILLNESS:  Mr. Sprague is a delightful, 71 year old gentleman with diagnosis of recurrent prostate cancer. His oncologic history is detailed below.     ONCOLOGIC HISTORY:   1.  Recurrent prostate cancer.   - 12/01/2006:  Found to have a PSA of 16.3 on screening.   - 12/27/2006:  Prostate biopsy showed moderate to poorly differentiated adenocarcinoma, [place] 3 + 4 = 7 in right mid, right apex, right mid lateral and right apex lateral.  [place] 3 + 3 = 6, moderately differentiated adenocarcinoma in right base lateral.  Perineural or extraprostatic extension not seen in these biopsies.   - 07/2007: Opted for brachytherapy in combination with external beam radiation therapy.  This was delivered in 07/2007, exact details unknown. Also received 1 year of hormonal therapy with external beam radiation therapy.   - Was monitored closely by our Urology colleagues and had a PSA of 0.82 as of 03/02/2017. In Dr. Ever Rodgers's note, he mentioned that post brachytherapy, the PSA went down to undetectable, but then became detectable in 11/2008 at 0.17.  After that, it fluctuated in the low range until 2015 when it went up to 0.27.   Treat for 10 days with augmentin and that should help.   "Then up to 2.86 in 2016 and 0.82 in 2017.     - 02/27/2019:  PSA found to be suddenly elevated to 17.80.    - 02/22/2019:  CT chest angio protocol for unrelated reason (shortness of breath) did not show any evidence of metastatic pulmonary or mediastinal or skeletal disease.   - 03/06/2019:  CT abdomen and pelvis with contrast showed clustered retroperitoneal/periaortic lymph nodes with the largest measuring 17 mm in short axis and additionally another left retroperitoneal lymph node measuring 2.1 cm in short axis with no mesenteric lymphadenopathy.  No evidence of bony metastatic disease.   - 03/06/2019:  Nuclear medicine whole body bone scan showed new area of increased uptake in the right acromion and right humeral head, given the distribution is likely degenerative.  No other suspicious areas of tracer uptake.   - 03/13/2019: PSA 23.3. Testosterone: 23.30. 04/03/2019: PSA 21.50.   - March 2019:  tumor board discussion - recommendation by urology and rad onc to pursue systemic therapy.   - 04/03/2019: Started Lupron 22.5mg every 3 months.   - 04/09/2019: Started abiraterone 1000mg every day plus prednisone 5mg every day per LATTITUDE regimen.  - 07/24/2019: CT CAP and NM bone scan revealed stable disease with no evidence of disease progression.  - 08/27/2019: CT A/P without contrast stone protocol for hematuria- \"No acute abnormality in the abdomen or pelvis.  The small right inguinal hernia contains a short segment of nonobstructed small bowel.  Severe sigmoid diverticulosis.\"  - 11/6/19: CT C/A/P with contrast - \"Overall, stable exam. Scattered retroperitoneal lymph nodes are stable. One hypodense area is seemingly cystic in the retroperitoneum, unchanged in size and appearance.\" NM bone scan - no evidence of disease.  - 05/8/20: CT C/A/P with contrast - BRYCE. Previously pathologically abnormal RP LN have normalized. NM bone scan - BRYCE.  - 12/4/2020: CT C/A/P with contrast and NM bone scan - BRYCE. "     6/19/20 PSA = <0.01  12/4/20 PSA =  <0.01  2/17/12 PSA  =  <0.01  3/25/21 PSA = <0.01  3/26/21 Discontinue Zytiga.   5/26/21 PSA = <0.01, testosterone 4  7/29/21 PSA=<0.01  10/22/21 PSA = <0.01, testosterone 51    INTERVAL HISTORY:  Nathan was called for oncology follow-up visit today. He is generally feeling ok and doing well.   He tested positive for covid on 10/9. It was an asymptomatic test that he did prior to scheduled procedure. He had headaches, fevers, chills, and phlegm after testing positive. He has fully recovered now and was cleared by occupational health to go back to work. He started work again this past Monday. He works for [employer]. He is doing well. His breathing is good and normal. He denies fevers, chills, headaches, cough, CP, SOB, abd pain, nausea/vomiting, constipation/diarrhea. His energy has been ok.   His appetite is not so good, but he is eating/drinking. He reports eating a healthier diet now with more fruits.   Moving bowels regularly.   No new pain.     A 14 point review of system is negative except for as noted above.      PHYSICAL EXAM:  Vitals: There were no vitals taken for this visit.  Phone visit. No exam.  Voice is clear and strong. No audible stridor, wheezing, or respiratory distress. The remainder of PE was deferred due to PHE.      LABORATORY DATA: Reviewed labs from 10/22/21.      Ref. Range 10/22/2021 11:49   Sodium Latest Ref Range: 133 - 144 mmol/L 138   Potassium Latest Ref Range: 3.4 - 5.3 mmol/L 4.1   Chloride Latest Ref Range: 94 - 109 mmol/L 107   Carbon Dioxide Latest Ref Range: 20 - 32 mmol/L 23   Urea Nitrogen Latest Ref Range: 7 - 30 mg/dL 14   Creatinine Latest Ref Range: 0.66 - 1.25 mg/dL 0.91   GFR Estimate Latest Ref Range: >60 mL/min/1.73m2 86   Calcium Latest Ref Range: 8.5 - 10.1 mg/dL 8.8   Anion Gap Latest Ref Range: 3 - 14 mmol/L 8   Albumin Latest Ref Range: 3.4 - 5.0 g/dL 3.1 (L)   Protein Total Latest Ref Range: 6.8 - 8.8 g/dL 7.5    Bilirubin Total Latest Ref Range: 0.2 - 1.3 mg/dL 0.4   Alkaline Phosphatase Latest Ref Range: 40 - 150 U/L 71   ALT Latest Ref Range: 0 - 70 U/L 24   AST Latest Ref Range: 0 - 45 U/L 18   PSA Latest Ref Range: 0.00 - 4.00 ug/L <0.01   Testosterone Total Latest Ref Range: 240 - 950 ng/dL 51 (L)   Glucose Latest Ref Range: 70 - 99 mg/dL 114 (H)   WBC Latest Ref Range: 4.0 - 11.0 10e3/uL 6.0   Hemoglobin Latest Ref Range: 13.3 - 17.7 g/dL 11.6 (L)   Hematocrit Latest Ref Range: 40.0 - 53.0 % 36.2 (L)   Platelet Count Latest Ref Range: 150 - 450 10e3/uL 199   RBC Count Latest Ref Range: 4.40 - 5.90 10e6/uL 3.94 (L)   MCV Latest Ref Range: 78 - 100 fL 92   MCH Latest Ref Range: 26.5 - 33.0 pg 29.4   MCHC Latest Ref Range: 31.5 - 36.5 g/dL 32.0   RDW Latest Ref Range: 10.0 - 15.0 % 13.5   % Neutrophils Latest Units: % 72   % Lymphocytes Latest Units: % 19   % Monocytes Latest Units: % 6   % Eosinophils Latest Units: % 1   % Basophils Latest Units: % 1   Absolute Basophils Latest Ref Range: 0.0 - 0.2 10e3/uL 0.0   Absolute Eosinophils Latest Ref Range: 0.0 - 0.7 10e3/uL 0.1   Absolute Immature Granulocytes Latest Ref Range: <=0.0 10e3/uL 0.0   Absolute Lymphocytes Latest Ref Range: 0.8 - 5.3 10e3/uL 1.2   Absolute Monocytes Latest Ref Range: 0.0 - 1.3 10e3/uL 0.4   % Immature Granulocytes Latest Units: % 1   Absolute Neutrophils Latest Ref Range: 1.6 - 8.3 10e3/uL 4.3   Absolute NRBCs Latest Units: 10e3/uL 0.0   NRBCs per 100 WBC Latest Ref Range: <1 /100 0        ASSESSMENT AND PLAN:  A 71 year old gentleman with initially AUA intermediate-risk prostate adenocarcinoma, now with an oligometastatic retroperitoneal relapse.        1. Recurrent prostate cancer.   - Patient started abiraterone plus ADT for the recurrent oligometastatic prostate cancer based on  tumor board recommendations. The bone scan finding of right acromion region uptake was NOT due to malignancy, but from an arthroplasty in Jan 2019. This has  continued to improve on bone scans and reported as degenerative.  - He continues to meet criteria for PCWG3 complete response (remission) at this time. This is exceptional response to therapy based on clinical assessment, undetectable PSA and negative scans.  - While recurrent oligometastatic disease is considered incurable, the median life expectancy for patients with low-volume oligometastatic disease such as this gentleman in the LATITUDE and STAMPEDE trials was in excess of 5 years.  This survival is expected to increase with the Faith of several newer therapies in the [facility] setting.    -He has tolerated therapy very well for the last 2 years, his PSA levels are excellently controlled and there is no signs of disease recurrence on most recent imaging.  -He had manageable side effects from ADT like fatigue, lower extremity swelling, hypertension, weight gain.  -With this excellent control we stopped abiraterone and lupron (last dose was eligard 45mg on 12/8/2020) and have continued to monitor his PSA and testosterone. His PSA from 10/22 is <0.01. His testosterone is up a little which we expected, at 51. Will continue to monitor PSA levels closely and if/when PSA levels rise in particular in the context of a rising testosterone, we will re-image.     Plan:  1. Stopped Lupron - last 6 month dose was in December 2020; stopped Abiraterone in June.   2. Expect rise in T - 51 from 10/22.   3. Follow PSA - <0.01 from 10/22.  4.  If/when a substantial rise in PSA occurs in particular in the context of a rising testosterone we will reimage with next generation imaging and consider whether metastasis directed therapy such as radiation or return to hormonal therapy is most appropriate at that time.  5. Eventually we will consider doing next generation sequencing to determine if additional therapy is needed should castration resistant disease develop.  6.  Continued to endorse continued light and duty at work due to the  fatigue and loss of muscle mass that occur through androgen deprivation therapy in a patient with a very physical job. With the eventual return of testosterone the situation may change.  7. We will likely do a PSMA PET when/if the PSA rises.  8. Labs in 3 weeks to follow-up on anemia. No bleeding. Consider anemia labs if hgb continues to down-trend.  9. Labs/follow-up with Dr. Prieto in 3 months, sooner if needed.     Phone Call Duration: 12 minutes     35 minutes spent on the date of the encounter doing chart review, review of test results, interpretation of tests, patient visit, documentation and discussion with other provider(s)     Elyse Herrera PA-C  [facility]  [address] [place] [id number] [phone]                Elyse Herrera PA-C

## 2022-06-26 ENCOUNTER — HEALTH MAINTENANCE LETTER (OUTPATIENT)
Age: 72
End: 2022-06-26

## 2022-11-19 NOTE — NURSING NOTE
"Oncology Rooming Note    July 17, 2019 5:17 PM   Keenan Sprague is a 68 year old male who presents for:    Chief Complaint   Patient presents with     Blood Draw     Labs drawn via  by RN in lab. VS taken.      Oncology Clinic Visit     Return Prostate Ca     Initial Vitals: /76 (BP Location: Right arm, Patient Position: Sitting, Cuff Size: Adult Regular)   Pulse 63   Temp 98.5  F (36.9  C) (Oral)   Resp 18   Ht 1.88 m (6' 2\")   Wt 130.6 kg (288 lb)   SpO2 98%   BMI 36.98 kg/m   Estimated body mass index is 36.98 kg/m  as calculated from the following:    Height as of this encounter: 1.88 m (6' 2\").    Weight as of this encounter: 130.6 kg (288 lb). Body surface area is 2.61 meters squared.  No Pain (0) Comment: Data Unavailable   No LMP for male patient.  Allergies reviewed: Yes  Medications reviewed: Yes    Medications: Medication refills not needed today.  Pharmacy name entered into [facility]:    [facility] - [place], [place] - [address]  [facility]  [facility] - [place], [place] - [address]  [id number] - [place], [place] - [address]  [facility] - [place], [place] - [address]    Clinical concerns: Lab results;        Eileen Mcginnis CMA              "
Chief Complaint   Patient presents with     Blood Draw     Labs drawn via  by RN in lab. VS taken.      Eliseo Toney RN    
Lupron administered in left ventrogluteal by LPN. Patient tolerated well and had no issues.    Jose Alfredo Valencia LPN  
- - -

## 2022-11-21 ENCOUNTER — HEALTH MAINTENANCE LETTER (OUTPATIENT)
Age: 72
End: 2022-11-21

## 2023-07-08 ENCOUNTER — HEALTH MAINTENANCE LETTER (OUTPATIENT)
Age: 73
End: 2023-07-08

## 2024-08-31 ENCOUNTER — HEALTH MAINTENANCE LETTER (OUTPATIENT)
Age: 74
End: 2024-08-31

## 2025-07-17 NOTE — TELEPHONE ENCOUNTER
----- Message from Jerardo Davis MD sent at 6/26/2019  6:00 PM CDT -----  Regarding: No-show  Nathan no-showed his appt. Plz call and ask if he was informed of this appt, and try to get him rescheduled to see me in the next 1-2 weeks.  Thanks,  AR  
Attempted to reach pt per Dr. Davis. No answer. [facility] clinic and reschedule appt within 1-2 weeks. Await response.   
Warm

## (undated) DEVICE — PREP CHLORAPREP 26ML TINTED HI-LITE ORANGE 930815

## (undated) DEVICE — SU VICRYL 3-0 SH 27" UND J416H

## (undated) DEVICE — PACK MINOR CUSTOM RIDGES SBA32RMRMA

## (undated) DEVICE — ESU GROUND PAD ADULT W/CORD E7507

## (undated) DEVICE — GLOVE PROTEXIS BLUE W/NEU-THERA 6.5  2D73EB65

## (undated) DEVICE — LINEN TOWEL PACK X10 5473

## (undated) DEVICE — DRAIN PENROSE 0.50"X18" LATEX FREE GR203

## (undated) DEVICE — LINEN TOWEL PACK X5 5464

## (undated) DEVICE — GLOVE PROTEXIS W/NEU-THERA 6.5  2D73TE65

## (undated) DEVICE — ADH SKIN CLOSURE PREMIERPRO EXOFIN MICOR HV 0.5ML 3471

## (undated) DEVICE — DRAPE LAP W/ARMBOARD 29410

## (undated) DEVICE — BLADE CLIPPER 3M 9670

## (undated) DEVICE — BAG CLEAR TRASH 1.3M 39X33" P4040C

## (undated) DEVICE — SU VICRYL 3-0 TIE 12X18" J904T

## (undated) DEVICE — SU PDS II 2-0 CT-2 27"  Z333H

## (undated) DEVICE — LINEN FULL SHEET 5511

## (undated) DEVICE — CLEANSER JET LAVAGE IRRISEPT 0.05% CHG IRRISEPT45USA

## (undated) DEVICE — LINEN HALF SHEET 5512

## (undated) DEVICE — SU VICRYL 2-0 SH 27" J317H

## (undated) DEVICE — SU VICRYL 4-0 PS-2 18" UND J496H

## (undated) RX ORDER — CEFAZOLIN SODIUM/WATER 2 G/20 ML
SYRINGE (ML) INTRAVENOUS
Status: DISPENSED
Start: 2021-10-29

## (undated) RX ORDER — LIDOCAINE HYDROCHLORIDE 20 MG/ML
JELLY TOPICAL
Status: DISPENSED
Start: 2020-01-23

## (undated) RX ORDER — FENTANYL CITRATE 50 UG/ML
INJECTION, SOLUTION INTRAMUSCULAR; INTRAVENOUS
Status: DISPENSED
Start: 2021-10-29

## (undated) RX ORDER — OXYCODONE HYDROCHLORIDE 5 MG/1
TABLET ORAL
Status: DISPENSED
Start: 2021-10-29

## (undated) RX ORDER — EPHEDRINE SULFATE 50 MG/ML
INJECTION, SOLUTION INTRAMUSCULAR; INTRAVENOUS; SUBCUTANEOUS
Status: DISPENSED
Start: 2021-10-29

## (undated) RX ORDER — BUPIVACAINE HYDROCHLORIDE 5 MG/ML
INJECTION, SOLUTION EPIDURAL; INTRACAUDAL
Status: DISPENSED
Start: 2021-10-29